# Patient Record
Sex: FEMALE | Race: WHITE | Employment: OTHER | URBAN - METROPOLITAN AREA
[De-identification: names, ages, dates, MRNs, and addresses within clinical notes are randomized per-mention and may not be internally consistent; named-entity substitution may affect disease eponyms.]

---

## 2021-02-09 ENCOUNTER — IMMUNIZATION (OUTPATIENT)
Dept: PRIMARY CARE CLINIC | Age: 66
End: 2021-02-09
Payer: MEDICARE

## 2021-02-09 PROCEDURE — 0011A PR IMM ADMN SARSCOV2 100 MCG/0.5 ML 1ST DOSE: CPT | Performed by: FAMILY MEDICINE

## 2021-02-09 PROCEDURE — 91301 COVID-19, MODERNA VACCINE 100MCG/0.5ML DOSE: CPT | Performed by: FAMILY MEDICINE

## 2021-02-11 ENCOUNTER — OFFICE VISIT (OUTPATIENT)
Dept: PRIMARY CARE CLINIC | Age: 66
End: 2021-02-11
Payer: MEDICARE

## 2021-02-11 VITALS
WEIGHT: 131 LBS | BODY MASS INDEX: 24.73 KG/M2 | HEIGHT: 61 IN | HEART RATE: 102 BPM | DIASTOLIC BLOOD PRESSURE: 70 MMHG | OXYGEN SATURATION: 96 % | SYSTOLIC BLOOD PRESSURE: 138 MMHG | TEMPERATURE: 96.7 F

## 2021-02-11 DIAGNOSIS — R03.0 ELEVATED BLOOD PRESSURE READING: Primary | ICD-10-CM

## 2021-02-11 PROCEDURE — 1111F DSCHRG MED/CURRENT MED MERGE: CPT | Performed by: FAMILY MEDICINE

## 2021-02-11 PROCEDURE — 99213 OFFICE O/P EST LOW 20 MIN: CPT | Performed by: FAMILY MEDICINE

## 2021-02-11 SDOH — ECONOMIC STABILITY: TRANSPORTATION INSECURITY
IN THE PAST 12 MONTHS, HAS THE LACK OF TRANSPORTATION KEPT YOU FROM MEDICAL APPOINTMENTS OR FROM GETTING MEDICATIONS?: NO

## 2021-02-11 SDOH — ECONOMIC STABILITY: TRANSPORTATION INSECURITY
IN THE PAST 12 MONTHS, HAS LACK OF TRANSPORTATION KEPT YOU FROM MEETINGS, WORK, OR FROM GETTING THINGS NEEDED FOR DAILY LIVING?: NO

## 2021-02-11 SDOH — ECONOMIC STABILITY: FOOD INSECURITY: WITHIN THE PAST 12 MONTHS, THE FOOD YOU BOUGHT JUST DIDN'T LAST AND YOU DIDN'T HAVE MONEY TO GET MORE.: NEVER TRUE

## 2021-02-11 SDOH — HEALTH STABILITY: MENTAL HEALTH: HOW MANY STANDARD DRINKS CONTAINING ALCOHOL DO YOU HAVE ON A TYPICAL DAY?: 1 OR 2

## 2021-02-11 SDOH — ECONOMIC STABILITY: FOOD INSECURITY: WITHIN THE PAST 12 MONTHS, YOU WORRIED THAT YOUR FOOD WOULD RUN OUT BEFORE YOU GOT MONEY TO BUY MORE.: NEVER TRUE

## 2021-02-11 SDOH — ECONOMIC STABILITY: INCOME INSECURITY: HOW HARD IS IT FOR YOU TO PAY FOR THE VERY BASICS LIKE FOOD, HOUSING, MEDICAL CARE, AND HEATING?: NOT HARD AT ALL

## 2021-02-11 ASSESSMENT — ENCOUNTER SYMPTOMS
RESPIRATORY NEGATIVE: 1
EYES NEGATIVE: 1
GASTROINTESTINAL NEGATIVE: 1

## 2021-02-11 ASSESSMENT — PATIENT HEALTH QUESTIONNAIRE - PHQ9
SUM OF ALL RESPONSES TO PHQ QUESTIONS 1-9: 0

## 2021-03-09 ENCOUNTER — IMMUNIZATION (OUTPATIENT)
Dept: PRIMARY CARE CLINIC | Age: 66
End: 2021-03-09
Payer: MEDICARE

## 2021-03-09 PROCEDURE — 0012A PR IMM ADMN SARSCOV2 100 MCG/0.5 ML 2ND DOSE: CPT | Performed by: FAMILY MEDICINE

## 2021-03-09 PROCEDURE — 91301 COVID-19, MODERNA VACCINE 100MCG/0.5ML DOSE: CPT | Performed by: FAMILY MEDICINE

## 2021-04-23 ENCOUNTER — OFFICE VISIT (OUTPATIENT)
Dept: PRIMARY CARE CLINIC | Age: 66
End: 2021-04-23
Payer: MEDICARE

## 2021-04-23 VITALS
BODY MASS INDEX: 24.77 KG/M2 | OXYGEN SATURATION: 95 % | DIASTOLIC BLOOD PRESSURE: 84 MMHG | HEIGHT: 61 IN | SYSTOLIC BLOOD PRESSURE: 138 MMHG | WEIGHT: 131.2 LBS | HEART RATE: 96 BPM

## 2021-04-23 DIAGNOSIS — F34.1 DYSTHYMIA: Primary | ICD-10-CM

## 2021-04-23 PROCEDURE — G8427 DOCREV CUR MEDS BY ELIG CLIN: HCPCS | Performed by: FAMILY MEDICINE

## 2021-04-23 PROCEDURE — 99213 OFFICE O/P EST LOW 20 MIN: CPT | Performed by: FAMILY MEDICINE

## 2021-04-23 PROCEDURE — 4004F PT TOBACCO SCREEN RCVD TLK: CPT | Performed by: FAMILY MEDICINE

## 2021-04-23 PROCEDURE — G8420 CALC BMI NORM PARAMETERS: HCPCS | Performed by: FAMILY MEDICINE

## 2021-04-23 PROCEDURE — G8400 PT W/DXA NO RESULTS DOC: HCPCS | Performed by: FAMILY MEDICINE

## 2021-04-23 PROCEDURE — 1090F PRES/ABSN URINE INCON ASSESS: CPT | Performed by: FAMILY MEDICINE

## 2021-04-23 PROCEDURE — 3017F COLORECTAL CA SCREEN DOC REV: CPT | Performed by: FAMILY MEDICINE

## 2021-04-23 PROCEDURE — 1123F ACP DISCUSS/DSCN MKR DOCD: CPT | Performed by: FAMILY MEDICINE

## 2021-04-23 PROCEDURE — 4040F PNEUMOC VAC/ADMIN/RCVD: CPT | Performed by: FAMILY MEDICINE

## 2021-04-23 RX ORDER — ESCITALOPRAM OXALATE 5 MG/1
5 TABLET ORAL DAILY
Qty: 30 TABLET | Refills: 5 | Status: SHIPPED | OUTPATIENT
Start: 2021-04-23 | End: 2021-05-21 | Stop reason: SDUPTHER

## 2021-04-23 ASSESSMENT — PATIENT HEALTH QUESTIONNAIRE - PHQ9
SUM OF ALL RESPONSES TO PHQ QUESTIONS 1-9: 7
2. FEELING DOWN, DEPRESSED OR HOPELESS: 2
10. IF YOU CHECKED OFF ANY PROBLEMS, HOW DIFFICULT HAVE THESE PROBLEMS MADE IT FOR YOU TO DO YOUR WORK, TAKE CARE OF THINGS AT HOME, OR GET ALONG WITH OTHER PEOPLE: 2
3. TROUBLE FALLING OR STAYING ASLEEP: 0
7. TROUBLE CONCENTRATING ON THINGS, SUCH AS READING THE NEWSPAPER OR WATCHING TELEVISION: 0
4. FEELING TIRED OR HAVING LITTLE ENERGY: 3

## 2021-05-21 ENCOUNTER — OFFICE VISIT (OUTPATIENT)
Dept: PRIMARY CARE CLINIC | Age: 66
End: 2021-05-21
Payer: MEDICARE

## 2021-05-21 VITALS
HEIGHT: 61 IN | SYSTOLIC BLOOD PRESSURE: 138 MMHG | DIASTOLIC BLOOD PRESSURE: 80 MMHG | BODY MASS INDEX: 24.24 KG/M2 | WEIGHT: 128.4 LBS | HEART RATE: 76 BPM | OXYGEN SATURATION: 95 %

## 2021-05-21 DIAGNOSIS — F32.A DEPRESSION, UNSPECIFIED DEPRESSION TYPE: Primary | ICD-10-CM

## 2021-05-21 PROCEDURE — 4004F PT TOBACCO SCREEN RCVD TLK: CPT | Performed by: FAMILY MEDICINE

## 2021-05-21 PROCEDURE — 1090F PRES/ABSN URINE INCON ASSESS: CPT | Performed by: FAMILY MEDICINE

## 2021-05-21 PROCEDURE — G8427 DOCREV CUR MEDS BY ELIG CLIN: HCPCS | Performed by: FAMILY MEDICINE

## 2021-05-21 PROCEDURE — 1123F ACP DISCUSS/DSCN MKR DOCD: CPT | Performed by: FAMILY MEDICINE

## 2021-05-21 PROCEDURE — G8400 PT W/DXA NO RESULTS DOC: HCPCS | Performed by: FAMILY MEDICINE

## 2021-05-21 PROCEDURE — 3017F COLORECTAL CA SCREEN DOC REV: CPT | Performed by: FAMILY MEDICINE

## 2021-05-21 PROCEDURE — 99213 OFFICE O/P EST LOW 20 MIN: CPT | Performed by: FAMILY MEDICINE

## 2021-05-21 PROCEDURE — G8420 CALC BMI NORM PARAMETERS: HCPCS | Performed by: FAMILY MEDICINE

## 2021-05-21 PROCEDURE — 4040F PNEUMOC VAC/ADMIN/RCVD: CPT | Performed by: FAMILY MEDICINE

## 2021-05-21 RX ORDER — ESCITALOPRAM OXALATE 10 MG/1
10 TABLET ORAL DAILY
Qty: 30 TABLET | Refills: 5 | Status: SHIPPED | OUTPATIENT
Start: 2021-05-21 | End: 2021-12-18 | Stop reason: SDUPTHER

## 2021-05-21 ASSESSMENT — PATIENT HEALTH QUESTIONNAIRE - PHQ9
SUM OF ALL RESPONSES TO PHQ QUESTIONS 1-9: 2
SUM OF ALL RESPONSES TO PHQ QUESTIONS 1-9: 2
SUM OF ALL RESPONSES TO PHQ9 QUESTIONS 1 & 2: 2
1. LITTLE INTEREST OR PLEASURE IN DOING THINGS: 1

## 2021-05-21 NOTE — PROGRESS NOTES
Subjective:      Patient ID: Calixto Osborn is a 77 y.o. female. hasnt seen much change and none reported by spouse  Tolerating lowdose lexapro      Review of Systems   Constitutional: Negative. Psychiatric/Behavioral: Negative. All other systems reviewed and are negative. Objective:   Physical Exam  Constitutional:       Appearance: She is normal weight. Cardiovascular:      Rate and Rhythm: Normal rate and regular rhythm. Neurological:      Mental Status: She is alert. Psychiatric:         Mood and Affect: Mood normal.         Behavior: Behavior normal.         Thought Content: Thought content normal.         Judgment: Judgment normal.         Assessment:      No diagnosis found. Plan:      There are no diagnoses linked to this encounter.     increase lexapro to 10 mg        Electronically signed by Cy Barber MD on 5/21/2021 at 11:36 AM

## 2021-06-22 ENCOUNTER — TELEPHONE (OUTPATIENT)
Dept: PRIMARY CARE CLINIC | Age: 66
End: 2021-06-22

## 2021-06-22 DIAGNOSIS — Z12.11 COLON CANCER SCREENING: Primary | ICD-10-CM

## 2021-06-25 ENCOUNTER — OFFICE VISIT (OUTPATIENT)
Dept: PRIMARY CARE CLINIC | Age: 66
End: 2021-06-25
Payer: MEDICARE

## 2021-06-25 VITALS
BODY MASS INDEX: 24.35 KG/M2 | SYSTOLIC BLOOD PRESSURE: 130 MMHG | DIASTOLIC BLOOD PRESSURE: 85 MMHG | HEIGHT: 61 IN | HEART RATE: 84 BPM | OXYGEN SATURATION: 94 % | WEIGHT: 129 LBS

## 2021-06-25 DIAGNOSIS — F32.A DEPRESSION, UNSPECIFIED DEPRESSION TYPE: Primary | ICD-10-CM

## 2021-06-25 PROCEDURE — G8427 DOCREV CUR MEDS BY ELIG CLIN: HCPCS | Performed by: FAMILY MEDICINE

## 2021-06-25 PROCEDURE — 1090F PRES/ABSN URINE INCON ASSESS: CPT | Performed by: FAMILY MEDICINE

## 2021-06-25 PROCEDURE — 4004F PT TOBACCO SCREEN RCVD TLK: CPT | Performed by: FAMILY MEDICINE

## 2021-06-25 PROCEDURE — G8400 PT W/DXA NO RESULTS DOC: HCPCS | Performed by: FAMILY MEDICINE

## 2021-06-25 PROCEDURE — 3017F COLORECTAL CA SCREEN DOC REV: CPT | Performed by: FAMILY MEDICINE

## 2021-06-25 PROCEDURE — G8420 CALC BMI NORM PARAMETERS: HCPCS | Performed by: FAMILY MEDICINE

## 2021-06-25 PROCEDURE — 4040F PNEUMOC VAC/ADMIN/RCVD: CPT | Performed by: FAMILY MEDICINE

## 2021-06-25 PROCEDURE — 99213 OFFICE O/P EST LOW 20 MIN: CPT | Performed by: FAMILY MEDICINE

## 2021-06-25 PROCEDURE — 1123F ACP DISCUSS/DSCN MKR DOCD: CPT | Performed by: FAMILY MEDICINE

## 2021-06-29 NOTE — PROGRESS NOTES
Subjective:      Patient ID: Cesar Rosales is a 74 y.o. female. Medication for depression is working      Review of Systems   Constitutional: Negative. All other systems reviewed and are negative. Objective:   Physical Exam  Vitals reviewed. Constitutional:       Appearance: Normal appearance. HENT:      Nose: Nose normal.   Eyes:      Pupils: Pupils are equal, round, and reactive to light. Cardiovascular:      Rate and Rhythm: Normal rate and regular rhythm. Pulmonary:      Effort: Pulmonary effort is normal.   Musculoskeletal:         General: Normal range of motion. Cervical back: Normal range of motion. Skin:     General: Skin is warm. Neurological:      General: No focal deficit present. Mental Status: She is alert. Psychiatric:         Mood and Affect: Mood normal.         Thought Content: Thought content normal.         Assessment:      No diagnosis found. Plan:      There are no diagnoses linked to this encounter.             Electronically signed by Tenzin Jacobs MD on 6/25/2021 at 8:44 AM

## 2021-07-08 ENCOUNTER — TELEPHONE (OUTPATIENT)
Dept: PRIMARY CARE CLINIC | Age: 66
End: 2021-07-08

## 2021-07-19 ENCOUNTER — TELEPHONE (OUTPATIENT)
Dept: PRIMARY CARE CLINIC | Age: 66
End: 2021-07-19

## 2021-10-18 ENCOUNTER — TELEPHONE (OUTPATIENT)
Dept: PRIMARY CARE CLINIC | Age: 66
End: 2021-10-18

## 2021-12-10 DIAGNOSIS — Z12.11 COLON CANCER SCREENING: ICD-10-CM

## 2021-12-20 RX ORDER — ESCITALOPRAM OXALATE 10 MG/1
10 TABLET ORAL DAILY
Qty: 30 TABLET | Refills: 5 | Status: SHIPPED | OUTPATIENT
Start: 2021-12-20 | End: 2022-01-19

## 2022-02-11 ENCOUNTER — IMMUNIZATION (OUTPATIENT)
Dept: PRIMARY CARE CLINIC | Age: 67
End: 2022-02-11
Payer: MEDICARE

## 2022-02-11 PROCEDURE — 0064A COVID-19, MODERNA BOOSTER VACCINE 0.25ML DOSE: CPT | Performed by: FAMILY MEDICINE

## 2022-02-11 PROCEDURE — 91306 COVID-19, MODERNA BOOSTER VACCINE 0.25ML DOSE: CPT | Performed by: FAMILY MEDICINE

## 2022-11-08 ENCOUNTER — OFFICE VISIT (OUTPATIENT)
Dept: PRIMARY CARE CLINIC | Age: 67
End: 2022-11-08
Payer: MEDICARE

## 2022-11-08 VITALS
WEIGHT: 115 LBS | BODY MASS INDEX: 21.73 KG/M2 | OXYGEN SATURATION: 96 % | SYSTOLIC BLOOD PRESSURE: 148 MMHG | DIASTOLIC BLOOD PRESSURE: 88 MMHG | HEART RATE: 90 BPM

## 2022-11-08 DIAGNOSIS — J06.9 UPPER RESPIRATORY TRACT INFECTION, UNSPECIFIED TYPE: Primary | ICD-10-CM

## 2022-11-08 DIAGNOSIS — R05.1 ACUTE COUGH: ICD-10-CM

## 2022-11-08 DIAGNOSIS — R06.02 SHORTNESS OF BREATH: ICD-10-CM

## 2022-11-08 PROCEDURE — 99213 OFFICE O/P EST LOW 20 MIN: CPT

## 2022-11-08 PROCEDURE — G8400 PT W/DXA NO RESULTS DOC: HCPCS

## 2022-11-08 PROCEDURE — 1090F PRES/ABSN URINE INCON ASSESS: CPT

## 2022-11-08 PROCEDURE — G8427 DOCREV CUR MEDS BY ELIG CLIN: HCPCS

## 2022-11-08 PROCEDURE — 1036F TOBACCO NON-USER: CPT

## 2022-11-08 PROCEDURE — 3017F COLORECTAL CA SCREEN DOC REV: CPT

## 2022-11-08 PROCEDURE — 1123F ACP DISCUSS/DSCN MKR DOCD: CPT

## 2022-11-08 PROCEDURE — G8420 CALC BMI NORM PARAMETERS: HCPCS

## 2022-11-08 PROCEDURE — G8484 FLU IMMUNIZE NO ADMIN: HCPCS

## 2022-11-08 RX ORDER — AZITHROMYCIN 250 MG/1
250 TABLET, FILM COATED ORAL SEE ADMIN INSTRUCTIONS
Qty: 6 TABLET | Refills: 0 | Status: SHIPPED | OUTPATIENT
Start: 2022-11-08 | End: 2022-11-13

## 2022-11-08 RX ORDER — PREDNISONE 20 MG/1
40 TABLET ORAL DAILY
Qty: 10 TABLET | Refills: 0 | Status: SHIPPED | OUTPATIENT
Start: 2022-11-08 | End: 2022-11-13

## 2022-11-08 RX ORDER — ALBUTEROL SULFATE 90 UG/1
2 AEROSOL, METERED RESPIRATORY (INHALATION) EVERY 6 HOURS PRN
Qty: 18 G | Refills: 3 | Status: SHIPPED | OUTPATIENT
Start: 2022-11-08

## 2022-11-08 RX ORDER — BENZONATATE 200 MG/1
200 CAPSULE ORAL 3 TIMES DAILY PRN
Qty: 21 CAPSULE | Refills: 0 | Status: SHIPPED | OUTPATIENT
Start: 2022-11-08 | End: 2022-11-15

## 2022-11-08 SDOH — ECONOMIC STABILITY: FOOD INSECURITY: WITHIN THE PAST 12 MONTHS, YOU WORRIED THAT YOUR FOOD WOULD RUN OUT BEFORE YOU GOT MONEY TO BUY MORE.: NEVER TRUE

## 2022-11-08 SDOH — ECONOMIC STABILITY: FOOD INSECURITY: WITHIN THE PAST 12 MONTHS, THE FOOD YOU BOUGHT JUST DIDN'T LAST AND YOU DIDN'T HAVE MONEY TO GET MORE.: NEVER TRUE

## 2022-11-08 ASSESSMENT — ENCOUNTER SYMPTOMS
COUGH: 1
SORE THROAT: 1
CHEST TIGHTNESS: 0
VOMITING: 0
EYE DISCHARGE: 0
SINUS PRESSURE: 0
WHEEZING: 1
SHORTNESS OF BREATH: 1
RHINORRHEA: 1
DIARRHEA: 0

## 2022-11-08 ASSESSMENT — PATIENT HEALTH QUESTIONNAIRE - PHQ9
6. FEELING BAD ABOUT YOURSELF - OR THAT YOU ARE A FAILURE OR HAVE LET YOURSELF OR YOUR FAMILY DOWN: 0
10. IF YOU CHECKED OFF ANY PROBLEMS, HOW DIFFICULT HAVE THESE PROBLEMS MADE IT FOR YOU TO DO YOUR WORK, TAKE CARE OF THINGS AT HOME, OR GET ALONG WITH OTHER PEOPLE: 0
5. POOR APPETITE OR OVEREATING: 0
9. THOUGHTS THAT YOU WOULD BE BETTER OFF DEAD, OR OF HURTING YOURSELF: 0
SUM OF ALL RESPONSES TO PHQ QUESTIONS 1-9: 0
8. MOVING OR SPEAKING SO SLOWLY THAT OTHER PEOPLE COULD HAVE NOTICED. OR THE OPPOSITE, BEING SO FIGETY OR RESTLESS THAT YOU HAVE BEEN MOVING AROUND A LOT MORE THAN USUAL: 0
3. TROUBLE FALLING OR STAYING ASLEEP: 0
SUM OF ALL RESPONSES TO PHQ QUESTIONS 1-9: 0
4. FEELING TIRED OR HAVING LITTLE ENERGY: 0
1. LITTLE INTEREST OR PLEASURE IN DOING THINGS: 0
SUM OF ALL RESPONSES TO PHQ QUESTIONS 1-9: 0
2. FEELING DOWN, DEPRESSED OR HOPELESS: 0
SUM OF ALL RESPONSES TO PHQ QUESTIONS 1-9: 0
SUM OF ALL RESPONSES TO PHQ9 QUESTIONS 1 & 2: 0
7. TROUBLE CONCENTRATING ON THINGS, SUCH AS READING THE NEWSPAPER OR WATCHING TELEVISION: 0

## 2022-11-08 ASSESSMENT — SOCIAL DETERMINANTS OF HEALTH (SDOH): HOW HARD IS IT FOR YOU TO PAY FOR THE VERY BASICS LIKE FOOD, HOUSING, MEDICAL CARE, AND HEATING?: NOT HARD AT ALL

## 2022-11-08 NOTE — PROGRESS NOTES
[facility] [address] WALK-IN  [address] [place] [address] [id number]  Dept: [phone]    Emerson Moreno is a 69 y.o. female Established patient, who presents to the walk-in clinic today with conditions/complaints as noted below:    Chief Complaint   Patient presents with    Cough     Productive, SOB, runny nose x4 days. Denies GI issues         HPI:     Patient is a 24-year-old female that presents today for cold-like symptoms. Her symptoms started approximately four days ago and include nasal congestion, runny nose, sneezing, chills, and productive cough. Describes thick green/yellow sputum production. Admits to sore throat and headaches from coughing. Notes mild shortness of breath and occasional wheezing. PMH significant for COPD. Denies any known sick contacts. She has tried over-the-counter cold medication with mild relief. Denies any known fevers, chest pain, or GI symptoms. Past Medical History:   Diagnosis Date    Depression        Current Outpatient Medications   Medication Sig Dispense Refill    predniSONE (DELTASONE) 20 MG tablet Take 2 tablets by mouth daily for 5 days 10 tablet 0    albuterol sulfate HFA (PROVENTIL HFA) 108 (90 Base) MCG/ACT inhaler Inhale 2 puffs into the lungs every 6 hours as needed for Wheezing 18 g 3    azithromycin (ZITHROMAX) 250 MG tablet Take 1 tablet by mouth See Admin Instructions for 5 days 500mg on day 1 followed by 250mg on days 2 - 5 6 tablet 0    benzonatate (TESSALON) 200 MG capsule Take 1 capsule by mouth 3 times daily as needed for Cough 21 capsule 0     No current facility-administered medications for this visit. No Known Allergies    :     Review of Systems   Constitutional:  Positive for chills. Negative for fever.    HENT:  Positive for congestion, rhinorrhea, sneezing and sore throat. Negative for ear pain and sinus pressure. Eyes:  Negative for discharge. Respiratory:  Positive for cough (productive), shortness of breath and wheezing. Negative for chest tightness. Cardiovascular:  Negative for chest pain. Gastrointestinal:  Negative for diarrhea and vomiting. Musculoskeletal:  Negative for myalgias. Skin:  Negative for rash. Neurological:  Positive for headaches. Negative for dizziness.     :     BP (!) 148/88   Pulse 90   Wt 115 lb (52.2 kg)   SpO2 96%   BMI 21.73 kg/m²     Physical Exam  Vitals reviewed. Constitutional:       General: She is not in acute distress. Appearance: Normal appearance. HENT:      Head: Normocephalic and atraumatic. Right Ear: Tympanic membrane, ear canal and external ear normal.      Left Ear: Tympanic membrane, ear canal and external ear normal.      Nose: Congestion present. Mouth/Throat:      Mouth: Mucous membranes are moist.      Pharynx: Oropharynx is clear. Uvula midline. No posterior oropharyngeal erythema. Eyes:      Conjunctiva/sclera: Conjunctivae normal.   Cardiovascular:      Rate and Rhythm: Normal rate and regular rhythm. Heart sounds: Normal heart sounds. Pulmonary:      Effort: Pulmonary effort is normal.      Breath sounds: Decreased breath sounds (throughout) present. No wheezing, rhonchi or rales. Musculoskeletal:      Cervical back: Neck supple. Lymphadenopathy:      Cervical: No cervical adenopathy. Skin:     General: Skin is warm and dry. Findings: No rash. Neurological:      Mental Status: She is alert and oriented to person, place, and time. Psychiatric:         Mood and Affect: Mood normal.         Behavior: Behavior normal.         :          1. Upper respiratory tract infection, unspecified type  -     azithromycin (ZITHROMAX) 250 MG tablet;  Take 1 tablet by mouth See Admin Instructions for 5 days 500mg on day 1 followed by 250mg on days 2 - 5, Disp-6 tablet, R-0Normal  2. Acute cough  -     benzonatate (TESSALON) 200 MG capsule; Take 1 capsule by mouth 3 times daily as needed for Cough, Disp-21 capsule, R-0Normal  3. Shortness of breath  -     predniSONE (DELTASONE) 20 MG tablet; Take 2 tablets by mouth daily for 5 days, Disp-10 tablet, R-0Normal  -     albuterol sulfate HFA (PROVENTIL HFA) 108 (90 Base) MCG/ACT inhaler; Inhale 2 puffs into the lungs every 6 hours as needed for Wheezing, Disp-18 g, R-3Normal     :      Return if symptoms worsen or fail to improve. Orders Placed This Encounter   Medications    predniSONE (DELTASONE) 20 MG tablet     Sig: Take 2 tablets by mouth daily for 5 days     Dispense:  10 tablet     Refill:  0    albuterol sulfate HFA (PROVENTIL HFA) 108 (90 Base) MCG/ACT inhaler     Sig: Inhale 2 puffs into the lungs every 6 hours as needed for Wheezing     Dispense:  18 g     Refill:  3    azithromycin (ZITHROMAX) 250 MG tablet     Sig: Take 1 tablet by mouth See Admin Instructions for 5 days 500mg on day 1 followed by 250mg on days 2 - 5     Dispense:  6 tablet     Refill:  0    benzonatate (TESSALON) 200 MG capsule     Sig: Take 1 capsule by mouth 3 times daily as needed for Cough     Dispense:  21 capsule     Refill:  0     Patient declined COVID-19 testing. Instructed to finish the entire course of antibiotic treatment. Complete short course of oral steroids. May use cough suppressant as needed. Use albuterol inhaler as needed for shortness of breath or wheezing. Push oral hydration and rest.  Use acetaminophen or ibuprofen as needed for headaches or discomfort. Recommend daily use of mucinex. Follow-up if worsening or no improvement. Patient and/or parent given educational materials - see patient instructions. Discussed use, benefit, and side effects of prescribed medications. All patient questions answered. Patient and/or parent voiced understanding.       Electronically signed by BRET Correa 11/8/2022 at 12:54 PM

## 2022-11-08 NOTE — PATIENT INSTRUCTIONS
Finish the entire course of antibiotic treatment. Push oral hydration. Complete short course of oral steroids. Use Albuterol inhaler as needed for wheezing or shortness of breath. May use cough suppressant as needed.   Follow-up if worsening or no improvement'

## 2022-11-22 ENCOUNTER — NURSE ONLY (OUTPATIENT)
Dept: PRIMARY CARE CLINIC | Age: 67
End: 2022-11-22
Payer: MEDICARE

## 2022-11-22 DIAGNOSIS — Z23 NEED FOR IMMUNIZATION AGAINST INFLUENZA: Primary | ICD-10-CM

## 2022-11-22 PROCEDURE — G0008 ADMIN INFLUENZA VIRUS VAC: HCPCS | Performed by: FAMILY MEDICINE

## 2022-11-22 PROCEDURE — 90694 VACC AIIV4 NO PRSRV 0.5ML IM: CPT | Performed by: FAMILY MEDICINE

## 2023-11-04 SDOH — HEALTH STABILITY: PHYSICAL HEALTH: ON AVERAGE, HOW MANY DAYS PER WEEK DO YOU ENGAGE IN MODERATE TO STRENUOUS EXERCISE (LIKE A BRISK WALK)?: 1 DAY

## 2023-11-04 SDOH — HEALTH STABILITY: PHYSICAL HEALTH: ON AVERAGE, HOW MANY MINUTES DO YOU ENGAGE IN EXERCISE AT THIS LEVEL?: 10 MIN

## 2023-11-04 ASSESSMENT — SOCIAL DETERMINANTS OF HEALTH (SDOH)
WITHIN THE LAST YEAR, HAVE YOU BEEN KICKED, HIT, SLAPPED, OR OTHERWISE PHYSICALLY HURT BY YOUR PARTNER OR EX-PARTNER?: NO
WITHIN THE LAST YEAR, HAVE YOU BEEN AFRAID OF YOUR PARTNER OR EX-PARTNER?: NO
WITHIN THE LAST YEAR, HAVE TO BEEN RAPED OR FORCED TO HAVE ANY KIND OF SEXUAL ACTIVITY BY YOUR PARTNER OR EX-PARTNER?: NO
WITHIN THE LAST YEAR, HAVE YOU BEEN HUMILIATED OR EMOTIONALLY ABUSED IN OTHER WAYS BY YOUR PARTNER OR EX-PARTNER?: NO

## 2023-11-07 ENCOUNTER — OFFICE VISIT (OUTPATIENT)
Dept: PRIMARY CARE CLINIC | Age: 68
End: 2023-11-07
Payer: MEDICARE

## 2023-11-07 VITALS
HEIGHT: 60 IN | SYSTOLIC BLOOD PRESSURE: 136 MMHG | BODY MASS INDEX: 23.91 KG/M2 | WEIGHT: 121.8 LBS | RESPIRATION RATE: 16 BRPM | OXYGEN SATURATION: 99 % | HEART RATE: 89 BPM | DIASTOLIC BLOOD PRESSURE: 82 MMHG

## 2023-11-07 DIAGNOSIS — Z13.1 SCREENING FOR DIABETES MELLITUS (DM): ICD-10-CM

## 2023-11-07 DIAGNOSIS — Z87.891 PERSONAL HISTORY OF TOBACCO USE: ICD-10-CM

## 2023-11-07 DIAGNOSIS — F33.1 MODERATE EPISODE OF RECURRENT MAJOR DEPRESSIVE DISORDER (HCC): ICD-10-CM

## 2023-11-07 DIAGNOSIS — R00.2 PALPITATIONS: ICD-10-CM

## 2023-11-07 DIAGNOSIS — Z00.00 INITIAL MEDICARE ANNUAL WELLNESS VISIT: Primary | ICD-10-CM

## 2023-11-07 DIAGNOSIS — Z13.0 SCREENING, ANEMIA, DEFICIENCY, IRON: ICD-10-CM

## 2023-11-07 DIAGNOSIS — Z12.31 ENCOUNTER FOR SCREENING MAMMOGRAM FOR MALIGNANT NEOPLASM OF BREAST: ICD-10-CM

## 2023-11-07 DIAGNOSIS — Z11.59 NEED FOR HEPATITIS C SCREENING TEST: ICD-10-CM

## 2023-11-07 DIAGNOSIS — Z13.6 SCREENING FOR CARDIOVASCULAR CONDITION: ICD-10-CM

## 2023-11-07 PROCEDURE — G8484 FLU IMMUNIZE NO ADMIN: HCPCS | Performed by: NURSE PRACTITIONER

## 2023-11-07 PROCEDURE — 3017F COLORECTAL CA SCREEN DOC REV: CPT | Performed by: NURSE PRACTITIONER

## 2023-11-07 PROCEDURE — G0296 VISIT TO DETERM LDCT ELIG: HCPCS | Performed by: NURSE PRACTITIONER

## 2023-11-07 PROCEDURE — 1123F ACP DISCUSS/DSCN MKR DOCD: CPT | Performed by: NURSE PRACTITIONER

## 2023-11-07 PROCEDURE — G0438 PPPS, INITIAL VISIT: HCPCS | Performed by: NURSE PRACTITIONER

## 2023-11-07 RX ORDER — NICOTINE 21 MG/24HR
1 PATCH, TRANSDERMAL 24 HOURS TRANSDERMAL DAILY
Qty: 30 PATCH | Refills: 5 | Status: SHIPPED | OUTPATIENT
Start: 2023-11-07 | End: 2024-05-05

## 2023-11-07 RX ORDER — FLUOXETINE HYDROCHLORIDE 20 MG/1
20 CAPSULE ORAL DAILY
Qty: 30 CAPSULE | Refills: 2 | Status: SHIPPED | OUTPATIENT
Start: 2023-11-07

## 2023-11-07 SDOH — ECONOMIC STABILITY: FOOD INSECURITY: WITHIN THE PAST 12 MONTHS, THE FOOD YOU BOUGHT JUST DIDN'T LAST AND YOU DIDN'T HAVE MONEY TO GET MORE.: NEVER TRUE

## 2023-11-07 SDOH — ECONOMIC STABILITY: FOOD INSECURITY: WITHIN THE PAST 12 MONTHS, YOU WORRIED THAT YOUR FOOD WOULD RUN OUT BEFORE YOU GOT MONEY TO BUY MORE.: NEVER TRUE

## 2023-11-07 SDOH — ECONOMIC STABILITY: HOUSING INSECURITY
IN THE LAST 12 MONTHS, WAS THERE A TIME WHEN YOU DID NOT HAVE A STEADY PLACE TO SLEEP OR SLEPT IN A SHELTER (INCLUDING NOW)?: NO

## 2023-11-07 SDOH — ECONOMIC STABILITY: INCOME INSECURITY: HOW HARD IS IT FOR YOU TO PAY FOR THE VERY BASICS LIKE FOOD, HOUSING, MEDICAL CARE, AND HEATING?: NOT HARD AT ALL

## 2023-11-07 ASSESSMENT — PATIENT HEALTH QUESTIONNAIRE - PHQ9
SUM OF ALL RESPONSES TO PHQ9 QUESTIONS 1 & 2: 3
2. FEELING DOWN, DEPRESSED OR HOPELESS: 2
10. IF YOU CHECKED OFF ANY PROBLEMS, HOW DIFFICULT HAVE THESE PROBLEMS MADE IT FOR YOU TO DO YOUR WORK, TAKE CARE OF THINGS AT HOME, OR GET ALONG WITH OTHER PEOPLE: 1
4. FEELING TIRED OR HAVING LITTLE ENERGY: 2
SUM OF ALL RESPONSES TO PHQ QUESTIONS 1-9: 6
1. LITTLE INTEREST OR PLEASURE IN DOING THINGS: 1
SUM OF ALL RESPONSES TO PHQ QUESTIONS 1-9: 6
8. MOVING OR SPEAKING SO SLOWLY THAT OTHER PEOPLE COULD HAVE NOTICED. OR THE OPPOSITE, BEING SO FIGETY OR RESTLESS THAT YOU HAVE BEEN MOVING AROUND A LOT MORE THAN USUAL: 1
6. FEELING BAD ABOUT YOURSELF - OR THAT YOU ARE A FAILURE OR HAVE LET YOURSELF OR YOUR FAMILY DOWN: 0
5. POOR APPETITE OR OVEREATING: 0
SUM OF ALL RESPONSES TO PHQ QUESTIONS 1-9: 6
3. TROUBLE FALLING OR STAYING ASLEEP: 0
SUM OF ALL RESPONSES TO PHQ QUESTIONS 1-9: 6
7. TROUBLE CONCENTRATING ON THINGS, SUCH AS READING THE NEWSPAPER OR WATCHING TELEVISION: 0
9. THOUGHTS THAT YOU WOULD BE BETTER OFF DEAD, OR OF HURTING YOURSELF: 0

## 2023-11-07 ASSESSMENT — LIFESTYLE VARIABLES: HOW MANY STANDARD DRINKS CONTAINING ALCOHOL DO YOU HAVE ON A TYPICAL DAY: 1 OR 2

## 2023-11-07 NOTE — PROGRESS NOTES
lungs every 6 hours as needed for Wheezing  Patient not taking: Reported on 11/7/2023  Hakan Fu APRN - CNP       CareTeam (Including outside providers/suppliers regularly involved in providing care):   Patient Care Team:  BRET Bo CNP as PCP - General (Nurse Practitioner)     Reviewed and updated this visit:  Tobacco  Allergies  Meds  Problems  Med Hx  Surg Hx  Soc Hx  Fam Hx

## 2023-11-09 ENCOUNTER — HOSPITAL ENCOUNTER (OUTPATIENT)
Dept: NON INVASIVE DIAGNOSTICS | Age: 68
Discharge: HOME | End: 2023-11-09
Payer: MEDICARE

## 2023-11-09 ENCOUNTER — HOSPITAL ENCOUNTER (OUTPATIENT)
Age: 68
Discharge: HOME | End: 2023-11-09
Payer: MEDICARE

## 2023-11-09 DIAGNOSIS — Z13.0 SCREENING, ANEMIA, DEFICIENCY, IRON: ICD-10-CM

## 2023-11-09 DIAGNOSIS — Z13.6 SCREENING FOR CARDIOVASCULAR CONDITION: ICD-10-CM

## 2023-11-09 DIAGNOSIS — R00.2 PALPITATIONS: ICD-10-CM

## 2023-11-09 DIAGNOSIS — Z11.59 NEED FOR HEPATITIS C SCREENING TEST: ICD-10-CM

## 2023-11-09 DIAGNOSIS — Z13.1 SCREENING FOR DIABETES MELLITUS (DM): ICD-10-CM

## 2023-11-09 LAB
ALBUMIN SERPL-MCNC: 4.3 G/DL (ref 3.5–5.2)
ALBUMIN/GLOB SERPL: 1.5 {RATIO} (ref 1–2.5)
ALP SERPL-CCNC: 93 U/L (ref 35–104)
ALT SERPL-CCNC: 11 U/L (ref 5–33)
ANION GAP SERPL CALCULATED.3IONS-SCNC: 6 MMOL/L (ref 9–17)
AST SERPL-CCNC: 14 U/L
BASOPHILS # BLD: 0.06 K/UL (ref 0–0.2)
BASOPHILS NFR BLD: 1 % (ref 0–2)
BILIRUB SERPL-MCNC: 0.6 MG/DL (ref 0.3–1.2)
BUN SERPL-MCNC: 9 MG/DL (ref 8–23)
BUN/CREAT SERPL: 18 (ref 9–20)
CALCIUM SERPL-MCNC: 10 MG/DL (ref 8.6–10.4)
CHLORIDE SERPL-SCNC: 99 MMOL/L (ref 98–107)
CHOLEST SERPL-MCNC: 219 MG/DL
CHOLESTEROL/HDL RATIO: 2.5
CO2 SERPL-SCNC: 33 MMOL/L (ref 20–31)
CREAT SERPL-MCNC: 0.5 MG/DL (ref 0.5–0.9)
EOSINOPHIL # BLD: 0.04 K/UL (ref 0–0.44)
EOSINOPHILS RELATIVE PERCENT: 1 % (ref 1–4)
ERYTHROCYTE [DISTWIDTH] IN BLOOD BY AUTOMATED COUNT: 12.5 % (ref 11.8–14.4)
GFR SERPL CREATININE-BSD FRML MDRD: >60 ML/MIN/1.73M2
GLUCOSE SERPL-MCNC: 101 MG/DL (ref 70–99)
HCT VFR BLD AUTO: 46.4 % (ref 36.3–47.1)
HCV AB SERPL QL IA: NONREACTIVE
HDLC SERPL-MCNC: 87 MG/DL
HGB BLD-MCNC: 15.1 G/DL (ref 11.9–15.1)
IMM GRANULOCYTES # BLD AUTO: <0.03 K/UL (ref 0–0.3)
IMM GRANULOCYTES NFR BLD: 0 %
LDLC SERPL CALC-MCNC: 118 MG/DL (ref 0–130)
LYMPHOCYTES NFR BLD: 1.45 K/UL (ref 1.1–3.7)
LYMPHOCYTES RELATIVE PERCENT: 23 % (ref 24–43)
MCH RBC QN AUTO: 30.3 PG (ref 25.2–33.5)
MCHC RBC AUTO-ENTMCNC: 32.5 G/DL (ref 25.2–33.5)
MCV RBC AUTO: 93 FL (ref 82.6–102.9)
MONOCYTES NFR BLD: 0.47 K/UL (ref 0.1–1.2)
MONOCYTES NFR BLD: 8 % (ref 3–12)
NEUTROPHILS NFR BLD: 67 % (ref 36–65)
NEUTS SEG NFR BLD: 4.17 K/UL (ref 1.5–8.1)
NRBC BLD-RTO: 0 PER 100 WBC
PLATELET # BLD AUTO: 294 K/UL (ref 138–453)
PMV BLD AUTO: 10.1 FL (ref 8.1–13.5)
POTASSIUM SERPL-SCNC: 5 MMOL/L (ref 3.7–5.3)
PROT SERPL-MCNC: 7.1 G/DL (ref 6.4–8.3)
RBC # BLD AUTO: 4.99 M/UL (ref 3.95–5.11)
SODIUM SERPL-SCNC: 138 MMOL/L (ref 135–144)
T4 FREE SERPL-MCNC: 1.5 NG/DL (ref 0.9–1.7)
TRIGL SERPL-MCNC: 71 MG/DL
TSH SERPL DL<=0.05 MIU/L-ACNC: 0.89 UIU/ML (ref 0.3–5)
WBC OTHER # BLD: 6.2 K/UL (ref 3.5–11.3)

## 2023-11-09 PROCEDURE — 80053 COMPREHEN METABOLIC PANEL: CPT

## 2023-11-09 PROCEDURE — 85025 COMPLETE CBC W/AUTO DIFF WBC: CPT

## 2023-11-09 PROCEDURE — 80061 LIPID PANEL: CPT

## 2023-11-09 PROCEDURE — 84439 ASSAY OF FREE THYROXINE: CPT

## 2023-11-09 PROCEDURE — 84443 ASSAY THYROID STIM HORMONE: CPT

## 2023-11-09 PROCEDURE — 93005 ELECTROCARDIOGRAM TRACING: CPT

## 2023-11-09 PROCEDURE — 36415 COLL VENOUS BLD VENIPUNCTURE: CPT

## 2023-11-09 PROCEDURE — 86803 HEPATITIS C AB TEST: CPT

## 2023-11-12 DIAGNOSIS — R00.2 PALPITATION: ICD-10-CM

## 2023-11-12 DIAGNOSIS — R94.31 ABNORMAL EKG: Primary | ICD-10-CM

## 2023-11-12 LAB
EKG ATRIAL RATE: 82 BPM
EKG P AXIS: 83 DEGREES
EKG P-R INTERVAL: 140 MS
EKG Q-T INTERVAL: 374 MS
EKG QRS DURATION: 78 MS
EKG QTC CALCULATION (BAZETT): 436 MS
EKG R AXIS: -55 DEGREES
EKG T AXIS: 56 DEGREES
EKG VENTRICULAR RATE: 82 BPM

## 2023-11-13 ENCOUNTER — TELEPHONE (OUTPATIENT)
Dept: ONCOLOGY | Age: 68
End: 2023-11-13

## 2023-11-14 ENCOUNTER — HOSPITAL ENCOUNTER (OUTPATIENT)
Dept: MAMMOGRAPHY | Age: 68
Discharge: HOME | End: 2023-11-16
Payer: MEDICARE

## 2023-11-14 VITALS — WEIGHT: 120 LBS | HEIGHT: 60 IN | BODY MASS INDEX: 23.56 KG/M2

## 2023-11-14 DIAGNOSIS — Z12.31 ENCOUNTER FOR SCREENING MAMMOGRAM FOR MALIGNANT NEOPLASM OF BREAST: ICD-10-CM

## 2023-11-14 PROCEDURE — 77063 BREAST TOMOSYNTHESIS BI: CPT

## 2023-11-16 ENCOUNTER — HOSPITAL ENCOUNTER (OUTPATIENT)
Dept: NUCLEAR MEDICINE | Age: 68
Discharge: HOME | End: 2023-11-18
Payer: MEDICARE

## 2023-11-16 ENCOUNTER — HOSPITAL ENCOUNTER (OUTPATIENT)
Age: 68
Discharge: HOME | End: 2023-11-18
Payer: MEDICARE

## 2023-11-16 DIAGNOSIS — R94.39 ABNORMAL STRESS TEST: Primary | ICD-10-CM

## 2023-11-16 DIAGNOSIS — R00.2 PALPITATION: ICD-10-CM

## 2023-11-16 DIAGNOSIS — R94.31 ABNORMAL EKG: ICD-10-CM

## 2023-11-16 DIAGNOSIS — I25.9: ICD-10-CM

## 2023-11-16 LAB
NUC STRESS EJECTION FRACTION: 70 %
STRESS BASELINE DIAS BP: 97 MMHG
STRESS BASELINE HR: 80 BPM
STRESS BASELINE SYS BP: 171 MMHG
STRESS ESTIMATED WORKLOAD: 1 METS
STRESS PEAK DIAS BP: 97 MMHG
STRESS PEAK SYS BP: 171 MMHG
STRESS PERCENT HR ACHIEVED: 74 %
STRESS POST PEAK HR: 111 BPM
STRESS RATE PRESSURE PRODUCT: NORMAL BPM*MMHG
STRESS TARGET HR: 150 BPM
TID: 1.05

## 2023-11-16 PROCEDURE — 93016 CV STRESS TEST SUPVJ ONLY: CPT | Performed by: INTERNAL MEDICINE

## 2023-11-16 PROCEDURE — 78452 HT MUSCLE IMAGE SPECT MULT: CPT | Performed by: INTERNAL MEDICINE

## 2023-11-16 PROCEDURE — 3430000000 HC RX DIAGNOSTIC RADIOPHARMACEUTICAL: Performed by: NURSE PRACTITIONER

## 2023-11-16 PROCEDURE — 93018 CV STRESS TEST I&R ONLY: CPT | Performed by: INTERNAL MEDICINE

## 2023-11-16 PROCEDURE — 93017 CV STRESS TEST TRACING ONLY: CPT

## 2023-11-16 PROCEDURE — 6360000002 HC RX W HCPCS: Performed by: INTERNAL MEDICINE

## 2023-11-16 PROCEDURE — 78452 HT MUSCLE IMAGE SPECT MULT: CPT

## 2023-11-16 PROCEDURE — A9500 TC99M SESTAMIBI: HCPCS | Performed by: NURSE PRACTITIONER

## 2023-11-16 RX ORDER — TETRAKIS(2-METHOXYISOBUTYLISOCYANIDE)COPPER(I) TETRAFLUOROBORATE 1 MG/ML
30 INJECTION, POWDER, LYOPHILIZED, FOR SOLUTION INTRAVENOUS
Status: COMPLETED | OUTPATIENT
Start: 2023-11-16 | End: 2023-11-16

## 2023-11-16 RX ORDER — REGADENOSON 0.08 MG/ML
0.4 INJECTION, SOLUTION INTRAVENOUS ONCE
Status: COMPLETED | OUTPATIENT
Start: 2023-11-16 | End: 2023-11-16

## 2023-11-16 RX ORDER — TETRAKIS(2-METHOXYISOBUTYLISOCYANIDE)COPPER(I) TETRAFLUOROBORATE 1 MG/ML
10 INJECTION, POWDER, LYOPHILIZED, FOR SOLUTION INTRAVENOUS
Status: COMPLETED | OUTPATIENT
Start: 2023-11-16 | End: 2023-11-16

## 2023-11-16 RX ADMIN — REGADENOSON 0.4 MG: 0.08 INJECTION, SOLUTION INTRAVENOUS at 10:55

## 2023-11-16 RX ADMIN — Medication 30 MILLICURIE: at 10:55

## 2023-11-16 RX ADMIN — Medication 10 MILLICURIE: at 09:55

## 2023-11-17 ENCOUNTER — PATIENT MESSAGE (OUTPATIENT)
Dept: PRIMARY CARE CLINIC | Age: 68
End: 2023-11-17

## 2023-11-17 NOTE — PROGRESS NOTES
Unable to schedule an appointment with [facility]. Writer was put on hold for 7mins. Pt was notified and states she will give the office a call to schedule appt herself. Writer is faxing the referral over to [facility] per pt's request.

## 2023-11-17 NOTE — TELEPHONE ENCOUNTER
From: Barry Bowen  To:  Mick Celestin  Sent: 11/17/2023 11:31 AM EST  Subject: Testing for heart    Can this be scheduled at [facility] in [facility].  [facility]

## 2023-11-17 NOTE — PROGRESS NOTES
Attempted to reach out to [facility]. Was transferred to [place] location. Was on hold for 15 minutes.

## 2023-11-17 NOTE — PROGRESS NOTES
Writer called [facility] and spoke with Yanet. Writer asked if they can schedule for the [place] location and they are unable to. They do not have access. Cliffordr attempted to pend referral to [place] location but [facility] does not have that as an option; Yanet states that we cannot place a referral to the [facility] location if patient wants to go to [place], that we would need a separate referral and it can be faxed to [phone].     Cliffordr called [place] office again, and was placed on hold for 10 minutes. Unable to reach office. Please advise

## 2023-11-17 NOTE — PROGRESS NOTES
New referral placed to [facility], please call to see if we can help pt get scheduled. If unable to reach today, please inform pt to call to schedule.

## 2023-11-17 NOTE — TELEPHONE ENCOUNTER
Called and talked to patient.  Patient stated they already called and made an appointment at the hospital.

## 2023-11-22 ENCOUNTER — OFFICE VISIT (OUTPATIENT)
Dept: CARDIOLOGY | Age: 68
End: 2023-11-22
Payer: MEDICARE

## 2023-11-22 VITALS
DIASTOLIC BLOOD PRESSURE: 88 MMHG | HEART RATE: 84 BPM | RESPIRATION RATE: 12 BRPM | OXYGEN SATURATION: 94 % | BODY MASS INDEX: 23.75 KG/M2 | WEIGHT: 121 LBS | HEIGHT: 60 IN | SYSTOLIC BLOOD PRESSURE: 136 MMHG

## 2023-11-22 DIAGNOSIS — R94.39 ABNORMAL STRESS TEST: ICD-10-CM

## 2023-11-22 DIAGNOSIS — E78.5 HYPERLIPIDEMIA, UNSPECIFIED HYPERLIPIDEMIA TYPE: ICD-10-CM

## 2023-11-22 DIAGNOSIS — R06.09 DYSPNEA ON EXERTION: ICD-10-CM

## 2023-11-22 DIAGNOSIS — R94.31 ABNORMAL EKG: Primary | ICD-10-CM

## 2023-11-22 DIAGNOSIS — R00.2 PALPITATIONS: ICD-10-CM

## 2023-11-22 PROCEDURE — G8427 DOCREV CUR MEDS BY ELIG CLIN: HCPCS | Performed by: INTERNAL MEDICINE

## 2023-11-22 PROCEDURE — 1090F PRES/ABSN URINE INCON ASSESS: CPT | Performed by: INTERNAL MEDICINE

## 2023-11-22 PROCEDURE — 4004F PT TOBACCO SCREEN RCVD TLK: CPT | Performed by: INTERNAL MEDICINE

## 2023-11-22 PROCEDURE — G8484 FLU IMMUNIZE NO ADMIN: HCPCS | Performed by: INTERNAL MEDICINE

## 2023-11-22 PROCEDURE — 1123F ACP DISCUSS/DSCN MKR DOCD: CPT | Performed by: INTERNAL MEDICINE

## 2023-11-22 PROCEDURE — G8400 PT W/DXA NO RESULTS DOC: HCPCS | Performed by: INTERNAL MEDICINE

## 2023-11-22 PROCEDURE — 99204 OFFICE O/P NEW MOD 45 MIN: CPT | Performed by: INTERNAL MEDICINE

## 2023-11-22 PROCEDURE — G8420 CALC BMI NORM PARAMETERS: HCPCS | Performed by: INTERNAL MEDICINE

## 2023-11-22 PROCEDURE — 3017F COLORECTAL CA SCREEN DOC REV: CPT | Performed by: INTERNAL MEDICINE

## 2023-11-22 PROCEDURE — 99205 OFFICE O/P NEW HI 60 MIN: CPT | Performed by: INTERNAL MEDICINE

## 2023-11-22 RX ORDER — ROSUVASTATIN CALCIUM 10 MG/1
10 TABLET, COATED ORAL DAILY
Qty: 30 TABLET | Refills: 5 | Status: SHIPPED | OUTPATIENT
Start: 2023-11-22

## 2023-11-22 RX ORDER — ASPIRIN 81 MG/1
81 TABLET ORAL DAILY
Qty: 30 TABLET | Refills: 5 | Status: SHIPPED | OUTPATIENT
Start: 2023-11-22

## 2023-11-22 NOTE — PATIENT INSTRUCTIONS
Your Procedure Will Be Scheduled at:      [facility]    [address] [place], [address]   (located across from [address])    Located on the main floor of the [facility]. Report to our reception desk by the [employer]. Parking is free. Handicap parking is available by the main entrance. You may also park in [place] on Level 2 and enter building on the bridge to [facility]. Take elevator to the main floor. Our  will call you to schedule your procedure within a week. If you do not receive a phone call, please call the  directly at [phone] and leave a message, or call [place] office at [phone]. Date:______________________________    Arrival Time:________________________    Procedure Time:_____________________    Instructions:_____________________________      Bring Photo I.D. and insurance cards. Bring all Medications in the bottles. Pack an overnight bag in case you are required to spend the night. You will need someone to drive you home. The  will instruct you on holding food and drink the night before or morning of your procedure. You are to take your Medications, along with your Cardiac and/or Blood Pressure Medications, with small sips of water on the morning of your Procedure, unless instructed otherwise by your Physician. If you need additional directions please call [phone]. If you have any questions please feel free to call the [address] [place], [place] [address] office at [phone].

## 2023-11-22 NOTE — PROGRESS NOTES
scleral icterus. ENT: No Headaches, hearing loss or vertigo. No mouth sores or sore throat. Cardiovascular: see above  Respiratory: see above  Gastrointestinal: No abdominal pain, appetite loss, blood in stools. Genitourinary: No dysuria, trouble voiding, or hematuria. Musculoskeletal:  No gait disturbance, No weakness or joint complaints. Integumentary: No rash or pruritis. Neurological: No headache or diplopia. No tingling  Psychiatric: yes anxiety, or depression. Endocrine: No temperature intolerance. Hematologic/Lymphatic: No abnormal bruising or bleeding, blood clots or swollen lymph nodes. Allergic/Immunologic: No nasal congestion or hives. PHYSICAL EXAM:      Vitals:    11/22/23 1000   BP: 136/88   Pulse: 84   Resp: 12   SpO2: 94%       Constitutional and General Appearance: alert, cooperative, no distress and appears stated age  HEENT: PERRL, no cervical lymphadenopathy. No masses palpable. Normal oral mucosa  Respiratory:  Normal excursion and expansion without use of accessory muscles  Resp Auscultation: Good respiratory effort. No for increased work of breathing. On auscultation: clear to auscultation bilaterally  Cardiovascular:  Heart tones are crisp and normal. regular S1 and S2.  Jugular venous pulsation Normal  The carotid upstroke is normal in amplitude and contour without delay or bruit   Abdomen:   soft  Bowel sounds present  Extremities:   No edema  Neurological:  Alert and oriented. Cardiac Data:  EKG: NSR, atrial enlargement septal infarction. Labs:     CBC: No results for input(s): \"WBC\", \"HGB\", \"HCT\", \"PLT\" in the last 72 hours. BMP: No results for input(s): \"NA\", \"K\", \"CO2\", \"BUN\", \"CREATININE\", \"LABGLOM\", \"GLUCOSE\" in the last 72 hours. PT/INR: No results for input(s): \"PROTIME\", \"INR\" in the last 72 hours.   FASTING LIPID PANEL:  Lab Results   Component Value Date/Time    HDL 87 11/09/2023 01:50 PM    TRIG 71 11/09/2023 01:50 PM     LIVER PROFILE:No results for

## 2023-12-05 ENCOUNTER — OFFICE VISIT (OUTPATIENT)
Dept: PRIMARY CARE CLINIC | Age: 68
End: 2023-12-05
Payer: MEDICARE

## 2023-12-05 VITALS
RESPIRATION RATE: 16 BRPM | WEIGHT: 120.8 LBS | HEART RATE: 74 BPM | OXYGEN SATURATION: 96 % | DIASTOLIC BLOOD PRESSURE: 82 MMHG | SYSTOLIC BLOOD PRESSURE: 134 MMHG | BODY MASS INDEX: 23.59 KG/M2

## 2023-12-05 DIAGNOSIS — F41.9 ANXIETY: Primary | ICD-10-CM

## 2023-12-05 PROCEDURE — 99213 OFFICE O/P EST LOW 20 MIN: CPT | Performed by: NURSE PRACTITIONER

## 2023-12-05 PROCEDURE — 4004F PT TOBACCO SCREEN RCVD TLK: CPT | Performed by: NURSE PRACTITIONER

## 2023-12-05 PROCEDURE — G8420 CALC BMI NORM PARAMETERS: HCPCS | Performed by: NURSE PRACTITIONER

## 2023-12-05 PROCEDURE — G8484 FLU IMMUNIZE NO ADMIN: HCPCS | Performed by: NURSE PRACTITIONER

## 2023-12-05 PROCEDURE — 1123F ACP DISCUSS/DSCN MKR DOCD: CPT | Performed by: NURSE PRACTITIONER

## 2023-12-05 PROCEDURE — 1090F PRES/ABSN URINE INCON ASSESS: CPT | Performed by: NURSE PRACTITIONER

## 2023-12-05 PROCEDURE — G8400 PT W/DXA NO RESULTS DOC: HCPCS | Performed by: NURSE PRACTITIONER

## 2023-12-05 PROCEDURE — G8427 DOCREV CUR MEDS BY ELIG CLIN: HCPCS | Performed by: NURSE PRACTITIONER

## 2023-12-05 PROCEDURE — 3017F COLORECTAL CA SCREEN DOC REV: CPT | Performed by: NURSE PRACTITIONER

## 2023-12-05 RX ORDER — HYDROXYZINE HYDROCHLORIDE 25 MG/1
25 TABLET, FILM COATED ORAL EVERY 8 HOURS PRN
Qty: 30 TABLET | Refills: 0 | Status: SHIPPED | OUTPATIENT
Start: 2023-12-05 | End: 2023-12-15

## 2023-12-05 ASSESSMENT — ENCOUNTER SYMPTOMS
SORE THROAT: 0
SHORTNESS OF BREATH: 0
NAUSEA: 0
COLOR CHANGE: 0
CHEST TIGHTNESS: 0
ABDOMINAL PAIN: 0
VOMITING: 0
DIARRHEA: 0
RHINORRHEA: 0

## 2023-12-05 NOTE — PROGRESS NOTES
[facility] PRIMARY CARE  [address] [place] [address]  Dept: [phone]  Dept Fax: [phone]    Nithya Pak is a 77 y.o. female who presentstoday for her medical conditions/complaints as noted below.   Nithya Pak is c/o of  Chief Complaint   Patient presents with    Medication Check     Prozac         HPI:     Presents for 1 month f/u for anxiety  Does feel improved since starting prozac 20mg daily   Thinks she may benefit from PRN anxiety medication for episodes of increased stress  Denies SI/HI, rest and appetite are good     Saw cardiology for f/u after abnormal stress test  Has heart cath scheduled   No acute distress in office   She has started ASA 81mg and crestor 20mg       No results found for: \"LABA1C\"          ( goal A1C is < 7)   No components found for: \"LABMICR\"  LDL Cholesterol (mg/dL)   Date Value   11/09/2023 118       (goal LDL is <100)   AST (U/L)   Date Value   11/09/2023 14     ALT (U/L)   Date Value   11/09/2023 11     BUN (mg/dL)   Date Value   11/09/2023 9     BP Readings from Last 3 Encounters:   12/08/23 (!) 143/81   12/05/23 134/82   11/22/23 136/88          ([id number])    Past Medical History:   Diagnosis Date    COPD (chronic obstructive pulmonary disease) ([facility]) 2019    Depression       Past Surgical History:   Procedure Laterality Date    EYE SURGERY  Cataract       Family History   Problem Relation Age of Onset    Unknown Mother     Unknown Father        Social History     Tobacco Use    Smoking status: Every Day     Packs/day: 1.00     Years: 30.00     Additional pack years: 0.00     Total pack years: 30.00     Types: Cigarettes     Start date: 1/1/1992    Smokeless tobacco: Never    Tobacco comments:     Still   Substance Use Topics    Alcohol use: Yes     Comment: Very seldom      Current Outpatient Medications   Medication Sig Dispense Refill    hydrOXYzine HCl (ATARAX) 25 MG tablet Take 1 tablet by mouth every 8 hours as needed

## 2023-12-08 ENCOUNTER — HOSPITAL ENCOUNTER (OUTPATIENT)
Age: 68
Setting detail: OUTPATIENT SURGERY
Discharge: HOME HEALTH | End: 2023-12-08
Attending: INTERNAL MEDICINE | Admitting: INTERNAL MEDICINE
Payer: MEDICARE

## 2023-12-08 VITALS
DIASTOLIC BLOOD PRESSURE: 81 MMHG | OXYGEN SATURATION: 96 % | HEIGHT: 60 IN | HEART RATE: 74 BPM | WEIGHT: 118 LBS | RESPIRATION RATE: 19 BRPM | SYSTOLIC BLOOD PRESSURE: 143 MMHG | BODY MASS INDEX: 23.16 KG/M2 | TEMPERATURE: 98.6 F

## 2023-12-08 DIAGNOSIS — R94.39 ABNORMAL STRESS TEST: ICD-10-CM

## 2023-12-08 DIAGNOSIS — E78.5 HYPERLIPIDEMIA, UNSPECIFIED HYPERLIPIDEMIA TYPE: ICD-10-CM

## 2023-12-08 LAB
ACT BLD: 251 SEC (ref 79–149)
ECHO BSA: 1.51 M2

## 2023-12-08 PROCEDURE — 85347 COAGULATION TIME ACTIVATED: CPT

## 2023-12-08 PROCEDURE — 2580000003 HC RX 258: Performed by: INTERNAL MEDICINE

## 2023-12-08 PROCEDURE — 7100000011 HC PHASE II RECOVERY - ADDTL 15 MIN: Performed by: INTERNAL MEDICINE

## 2023-12-08 PROCEDURE — 2500000003 HC RX 250 WO HCPCS: Performed by: INTERNAL MEDICINE

## 2023-12-08 PROCEDURE — C1874 STENT, COATED/COV W/DEL SYS: HCPCS | Performed by: INTERNAL MEDICINE

## 2023-12-08 PROCEDURE — 99152 MOD SED SAME PHYS/QHP 5/>YRS: CPT | Performed by: INTERNAL MEDICINE

## 2023-12-08 PROCEDURE — 92920 PRQ TRLUML C ANGIOP 1ART&/BR: CPT | Performed by: INTERNAL MEDICINE

## 2023-12-08 PROCEDURE — 99153 MOD SED SAME PHYS/QHP EA: CPT | Performed by: INTERNAL MEDICINE

## 2023-12-08 PROCEDURE — 2709999900 HC NON-CHARGEABLE SUPPLY: Performed by: INTERNAL MEDICINE

## 2023-12-08 PROCEDURE — C9600 PERC DRUG-EL COR STENT SING: HCPCS | Performed by: INTERNAL MEDICINE

## 2023-12-08 PROCEDURE — 7100000010 HC PHASE II RECOVERY - FIRST 15 MIN: Performed by: INTERNAL MEDICINE

## 2023-12-08 PROCEDURE — 92978 ENDOLUMINL IVUS OCT C 1ST: CPT | Performed by: INTERNAL MEDICINE

## 2023-12-08 PROCEDURE — 6360000002 HC RX W HCPCS: Performed by: INTERNAL MEDICINE

## 2023-12-08 PROCEDURE — 93458 L HRT ARTERY/VENTRICLE ANGIO: CPT | Performed by: INTERNAL MEDICINE

## 2023-12-08 PROCEDURE — 6360000004 HC RX CONTRAST MEDICATION: Performed by: INTERNAL MEDICINE

## 2023-12-08 PROCEDURE — C1725 CATH, TRANSLUMIN NON-LASER: HCPCS | Performed by: INTERNAL MEDICINE

## 2023-12-08 PROCEDURE — C1887 CATHETER, GUIDING: HCPCS | Performed by: INTERNAL MEDICINE

## 2023-12-08 DEVICE — STENT CORONARY ONYX FRONTIER RX 3X22 MM ZOTAROLIMUS ELUT: Type: IMPLANTABLE DEVICE | Status: FUNCTIONAL

## 2023-12-08 RX ORDER — NITROGLYCERIN 20 MG/100ML
INJECTION INTRAVENOUS PRN
Status: DISCONTINUED | OUTPATIENT
Start: 2023-12-08 | End: 2023-12-08 | Stop reason: HOSPADM

## 2023-12-08 RX ORDER — VERAPAMIL HYDROCHLORIDE 2.5 MG/ML
INJECTION, SOLUTION INTRAVENOUS PRN
Status: DISCONTINUED | OUTPATIENT
Start: 2023-12-08 | End: 2023-12-08 | Stop reason: HOSPADM

## 2023-12-08 RX ORDER — HEPARIN SODIUM 1000 [USP'U]/ML
INJECTION, SOLUTION INTRAVENOUS; SUBCUTANEOUS PRN
Status: DISCONTINUED | OUTPATIENT
Start: 2023-12-08 | End: 2023-12-08 | Stop reason: HOSPADM

## 2023-12-08 RX ORDER — ROSUVASTATIN CALCIUM 20 MG/1
20 TABLET, COATED ORAL DAILY
Qty: 90 TABLET | Refills: 1 | Status: SHIPPED | OUTPATIENT
Start: 2023-12-08

## 2023-12-08 RX ORDER — FENTANYL CITRATE 50 UG/ML
INJECTION, SOLUTION INTRAMUSCULAR; INTRAVENOUS PRN
Status: DISCONTINUED | OUTPATIENT
Start: 2023-12-08 | End: 2023-12-08 | Stop reason: HOSPADM

## 2023-12-08 RX ORDER — SODIUM CHLORIDE 9 MG/ML
INJECTION, SOLUTION INTRAVENOUS CONTINUOUS PRN
Status: COMPLETED | OUTPATIENT
Start: 2023-12-08 | End: 2023-12-08

## 2023-12-08 RX ORDER — CLOPIDOGREL BISULFATE 75 MG/1
75 TABLET ORAL DAILY
Qty: 30 TABLET | Refills: 3 | Status: SHIPPED | OUTPATIENT
Start: 2023-12-08

## 2023-12-08 RX ORDER — SODIUM CHLORIDE 9 MG/ML
INJECTION, SOLUTION INTRAVENOUS CONTINUOUS
Status: DISCONTINUED | OUTPATIENT
Start: 2023-12-08 | End: 2023-12-08 | Stop reason: HOSPADM

## 2023-12-08 RX ORDER — MIDAZOLAM HYDROCHLORIDE 1 MG/ML
INJECTION INTRAMUSCULAR; INTRAVENOUS PRN
Status: DISCONTINUED | OUTPATIENT
Start: 2023-12-08 | End: 2023-12-08 | Stop reason: HOSPADM

## 2023-12-08 RX ADMIN — SODIUM CHLORIDE: 9 INJECTION, SOLUTION INTRAVENOUS at 11:49

## 2023-12-08 NOTE — PROGRESS NOTES
Patient admitted, consent signed and questions answered. Patient ready for procedure. Call light to reach with side rails up 2 of 2. Bilateral groins clipped with writer and Aj present. family at bedside with patient. History and physical needs updated.

## 2023-12-08 NOTE — PROGRESS NOTES
Patient ate all of meal without difficulty. Right wrist remains without hematoma or drainage. Patient voices no complaints.

## 2023-12-08 NOTE — H&P
[facility]  Procedure History and Physical Update          Patient Name: Stacie Hoffman  MRN:    <[id number]>  YOB: 1955  Date of evaluation:  12/8/2023    Procedure:    Cardiac cath +/- PCI    Indication for procedure:  Abnormal stress test      Please refer to the office note completed by Dr. José Miguel Dumont on 11/22 in the medical record and note that:    [x] I have examined the patient and reviewed the H&P/Consult and there are no changes to be made to the assessment or plan. [] I have examined the patient and reviewed the H&P/Consult and have noted the following changes:    Past Medical History:   Diagnosis Date    COPD (chronic obstructive pulmonary disease) ([address]) 2019    Depression        Past Surgical History:   Procedure Laterality Date    EYE SURGERY  Cataract       Family History   Problem Relation Age of Onset    Unknown Mother     Unknown Father        No Known Allergies    Prior to Admission medications    Medication Sig Start Date End Date Taking? Authorizing Provider   hydrOXYzine HCl (ATARAX) 25 MG tablet Take 1 tablet by mouth every 8 hours as needed for Anxiety 12/5/23 12/15/23  Lynda SIERRA APRN - CNP   rosuvastatin (CRESTOR) 10 MG tablet Take 1 tablet by mouth daily 11/22/23   Leeann Pemberton MD   aspirin 81 MG EC tablet Take 1 tablet by mouth daily 11/22/23   Leeann Pemberton MD   FLUoxetine (PROZAC) 20 MG capsule Take 1 capsule by mouth daily 11/7/23   Yris Newton, APRN - CNP   albuterol sulfate HFA (PROVENTIL HFA) 108 (90 Base) MCG/ACT inhaler Inhale 2 puffs into the lungs every 6 hours as needed for Wheezing 11/8/22   Hakan Fu APRN - CNP         There were no vitals filed for this visit. Constitutional and General Appearance:   alert, cooperative, no distress and appears stated age  HEENT:  PERRL, EOMI  Respiratory:  Normal excursion and expansion without use of accessory muscles  Resp Auscultation:  Good respiratory effort.  No for

## 2023-12-09 NOTE — PROGRESS NOTES
All discharge instructions reviewed, questions answered, paper signed and given copy. Patient discharged per ambulatory with  and belongings.

## 2023-12-11 ENCOUNTER — HOSPITAL ENCOUNTER (OUTPATIENT)
Age: 68
Discharge: HOME | End: 2023-12-13
Attending: INTERNAL MEDICINE
Payer: MEDICARE

## 2023-12-11 ENCOUNTER — TELEPHONE (OUTPATIENT)
Dept: CARDIOLOGY | Age: 68
End: 2023-12-11

## 2023-12-11 VITALS
SYSTOLIC BLOOD PRESSURE: 167 MMHG | DIASTOLIC BLOOD PRESSURE: 82 MMHG | HEIGHT: 60 IN | HEART RATE: 84 BPM | WEIGHT: 118 LBS | BODY MASS INDEX: 23.16 KG/M2

## 2023-12-11 DIAGNOSIS — R06.09 DYSPNEA ON EXERTION: ICD-10-CM

## 2023-12-11 DIAGNOSIS — R00.2 PALPITATIONS: ICD-10-CM

## 2023-12-11 LAB
ECHO AO ROOT DIAM: 2.8 CM
ECHO AO ROOT INDEX: 1.88 CM/M2
ECHO AV AREA PEAK VELOCITY: 2.5 CM2
ECHO AV AREA/BSA PEAK VELOCITY: 1.7 CM2/M2
ECHO AV PEAK GRADIENT: 5 MMHG
ECHO AV PEAK VELOCITY: 1.2 M/S
ECHO AV VELOCITY RATIO: 0.83
ECHO BSA: 1.51 M2
ECHO EST RA PRESSURE: 3 MMHG
ECHO LA AREA 4C: 9.6 CM2
ECHO LA DIAMETER INDEX: 1.81 CM/M2
ECHO LA DIAMETER: 2.7 CM
ECHO LA MAJOR AXIS: 3.4 CM
ECHO LA TO AORTIC ROOT RATIO: 0.96
ECHO LA VOL MOD A4C: 22 ML (ref 22–52)
ECHO LA VOLUME INDEX MOD A4C: 15 ML/M2 (ref 16–34)
ECHO LV E' LATERAL VELOCITY: 7 CM/S
ECHO LV E' SEPTAL VELOCITY: 6 CM/S
ECHO LV EDV A4C: 56 ML
ECHO LV EDV INDEX A4C: 38 ML/M2
ECHO LV EJECTION FRACTION A4C: 64 %
ECHO LV ESV A4C: 20 ML
ECHO LV ESV INDEX A4C: 13 ML/M2
ECHO LV FRACTIONAL SHORTENING: 29 % (ref 28–44)
ECHO LV INTERNAL DIMENSION DIASTOLE INDEX: 2.55 CM/M2
ECHO LV INTERNAL DIMENSION DIASTOLIC: 3.8 CM (ref 3.9–5.3)
ECHO LV INTERNAL DIMENSION SYSTOLIC INDEX: 1.81 CM/M2
ECHO LV INTERNAL DIMENSION SYSTOLIC: 2.7 CM
ECHO LV ISOVOLUMETRIC RELAXATION TIME (IVRT): 88 MS
ECHO LV IVSD: 1.4 CM (ref 0.6–0.9)
ECHO LV MASS 2D: 153.2 G (ref 67–162)
ECHO LV MASS INDEX 2D: 102.8 G/M2 (ref 43–95)
ECHO LV POSTERIOR WALL DIASTOLIC: 1 CM (ref 0.6–0.9)
ECHO LV RELATIVE WALL THICKNESS RATIO: 0.53
ECHO LVOT AREA: 2.8 CM2
ECHO LVOT DIAM: 1.9 CM
ECHO LVOT PEAK GRADIENT: 4 MMHG
ECHO LVOT PEAK VELOCITY: 1 M/S
ECHO MV A VELOCITY: 0.71 M/S
ECHO MV E DECELERATION TIME (DT): 342 MS
ECHO MV E VELOCITY: 0.58 M/S
ECHO MV E/A RATIO: 0.82
ECHO MV E/E' LATERAL: 8.29
ECHO MV E/E' RATIO (AVERAGED): 8.98
ECHO MV MAX VELOCITY: 1 M/S
ECHO MV PEAK GRADIENT: 4 MMHG
ECHO PV MAX VELOCITY: 1.1 M/S
ECHO PV PEAK GRADIENT: 5 MMHG
ECHO RIGHT VENTRICULAR SYSTOLIC PRESSURE (RVSP): 37 MMHG
ECHO TV PEAK GRADIENT: 2 MMHG
ECHO TV REGURGITANT MAX VELOCITY: 2.9 M/S
ECHO TV REGURGITANT PEAK GRADIENT: 34 MMHG

## 2023-12-11 PROCEDURE — 93306 TTE W/DOPPLER COMPLETE: CPT

## 2023-12-11 PROCEDURE — 93227 XTRNL ECG REC<48 HR R&I: CPT | Performed by: INTERNAL MEDICINE

## 2023-12-11 PROCEDURE — 93225 XTRNL ECG REC<48 HRS REC: CPT

## 2023-12-11 PROCEDURE — 93306 TTE W/DOPPLER COMPLETE: CPT | Performed by: INTERNAL MEDICINE

## 2023-12-11 NOTE — TELEPHONE ENCOUNTER
Pt called after cath on 11/8/23. Pt had issues with fatigue after stating the eliquis. Pt became dizzy and very weak and fatigued. so she stopped taking the medication after taking the eliquis a few days. Please advise.       Last Appt:  11/22/2023  Next Appt:   1/10/2024  Med verified in [address] [place] [address]

## 2023-12-11 NOTE — TELEPHONE ENCOUNTER
Called and spoke to patient. Pt stated she stopped the PLAVIX not Eliquis, that was given to her post PCI on 12/8/23. Patient was advised to immediately take the Plavix  again due to recent stent place and advised how important the medication is to take. Patient informed of potential consequences to not taking. Pt verbalized understanding and stated she will start it now and will take it tomorrow morning as well from then on. Pt stated he BP has been in the 130s/80s. patient wants letter to move to another apartment   Received: Today   Message Contents   DEVON Washington/Latha       Patient is asking for a letter so he can move to a bottom apartment, he doesn't think he should be climbing stairs with his heart condition. Pt. #[phone].      Letter prepared and mailed to address on file in demographics.

## 2023-12-12 NOTE — TELEPHONE ENCOUNTER
Patient informed per Dr Rosmery Morton to continue Plavix and advised to come in for appt with NP next week. Patient verbalized understanding and stated that she will continue taking the Plavix and scheduled appt with NP next week 12/20/23 at 11:45am. Pt had no further questions at the time.

## 2023-12-14 LAB — ECHO BSA: 1.51 M2

## 2023-12-15 ENCOUNTER — TELEPHONE (OUTPATIENT)
Dept: CARDIOLOGY | Age: 68
End: 2023-12-15

## 2023-12-15 DIAGNOSIS — R94.31 ABNORMAL HOLTER EXAM: Primary | ICD-10-CM

## 2023-12-15 NOTE — TELEPHONE ENCOUNTER
LM to notify pt of results and Dr. Sharon Lima rx to [facility]. I asked her to call back with questions.

## 2023-12-15 NOTE — TELEPHONE ENCOUNTER
MD [facility] Clinical Staff  Low dose metoprolol Rx sent.           Cardiac holter monitor (<= 48 hours)  Order: [id number]  Status: Final result       Visible to patient: Yes (seen)       Dx: Palpitations    1 Result Note  Details    Reading Physician Reading Date Result Priority   Dewayne Chan MD  [phone] 12/14/2023 Routine     Result Text  Sinus rhythm throughout with average HR 79 bpm  Occasional PVC's ( < 1%); no NSVT noted  Occasional PAC's ( < 1%); 4 runs of non-sustained PAT noted, longest 6 beats  No bradycardia or pauses noted  No symptoms reported

## 2024-01-02 DIAGNOSIS — R94.31 ABNORMAL HOLTER EXAM: ICD-10-CM

## 2024-01-02 RX ORDER — CLOPIDOGREL BISULFATE 75 MG/1
75 TABLET ORAL DAILY
Qty: 90 TABLET | Refills: 4 | Status: SHIPPED | OUTPATIENT
Start: 2024-01-02

## 2024-01-03 ENCOUNTER — PATIENT MESSAGE (OUTPATIENT)
Dept: PRIMARY CARE CLINIC | Age: 69
End: 2024-01-03

## 2024-01-03 DIAGNOSIS — R05.1 ACUTE COUGH: Primary | ICD-10-CM

## 2024-01-03 DIAGNOSIS — R94.31 ABNORMAL HOLTER EXAM: ICD-10-CM

## 2024-01-03 NOTE — TELEPHONE ENCOUNTER
From: Sunita Watts  To: Kita Newton  Sent: 1/3/2024 1:37 PM EST  Subject: Question     I'm sure I have bronchitis. Can you call in something for it. I've had before. Coughed so much I hurt.  Please let me know.  Thank you  Sunita Watts

## 2024-01-04 RX ORDER — PREDNISONE 20 MG/1
20 TABLET ORAL DAILY
Qty: 5 TABLET | Refills: 0 | Status: SHIPPED | OUTPATIENT
Start: 2024-01-04 | End: 2024-01-09

## 2024-01-04 RX ORDER — BENZONATATE 200 MG/1
200 CAPSULE ORAL 3 TIMES DAILY PRN
Qty: 30 CAPSULE | Refills: 0 | Status: SHIPPED | OUTPATIENT
Start: 2024-01-04 | End: 2024-01-11

## 2024-01-16 ENCOUNTER — OFFICE VISIT (OUTPATIENT)
Dept: PRIMARY CARE CLINIC | Age: 69
End: 2024-01-16
Payer: MEDICARE

## 2024-01-16 VITALS
SYSTOLIC BLOOD PRESSURE: 138 MMHG | BODY MASS INDEX: 22.89 KG/M2 | DIASTOLIC BLOOD PRESSURE: 84 MMHG | OXYGEN SATURATION: 95 % | HEART RATE: 62 BPM | WEIGHT: 117.2 LBS | RESPIRATION RATE: 18 BRPM

## 2024-01-16 DIAGNOSIS — R05.8 RESPIRATORY TRACT CONGESTION WITH COUGH: Primary | ICD-10-CM

## 2024-01-16 DIAGNOSIS — R06.02 SHORTNESS OF BREATH: ICD-10-CM

## 2024-01-16 PROCEDURE — 1090F PRES/ABSN URINE INCON ASSESS: CPT | Performed by: NURSE PRACTITIONER

## 2024-01-16 PROCEDURE — G8484 FLU IMMUNIZE NO ADMIN: HCPCS | Performed by: NURSE PRACTITIONER

## 2024-01-16 PROCEDURE — G8420 CALC BMI NORM PARAMETERS: HCPCS | Performed by: NURSE PRACTITIONER

## 2024-01-16 PROCEDURE — 3017F COLORECTAL CA SCREEN DOC REV: CPT | Performed by: NURSE PRACTITIONER

## 2024-01-16 PROCEDURE — G8427 DOCREV CUR MEDS BY ELIG CLIN: HCPCS | Performed by: NURSE PRACTITIONER

## 2024-01-16 PROCEDURE — 99214 OFFICE O/P EST MOD 30 MIN: CPT | Performed by: NURSE PRACTITIONER

## 2024-01-16 PROCEDURE — 4004F PT TOBACCO SCREEN RCVD TLK: CPT | Performed by: NURSE PRACTITIONER

## 2024-01-16 PROCEDURE — 1123F ACP DISCUSS/DSCN MKR DOCD: CPT | Performed by: NURSE PRACTITIONER

## 2024-01-16 PROCEDURE — G8400 PT W/DXA NO RESULTS DOC: HCPCS | Performed by: NURSE PRACTITIONER

## 2024-01-16 RX ORDER — AZITHROMYCIN 250 MG/1
250 TABLET, FILM COATED ORAL SEE ADMIN INSTRUCTIONS
Qty: 6 TABLET | Refills: 0 | Status: SHIPPED | OUTPATIENT
Start: 2024-01-16 | End: 2024-01-21

## 2024-01-16 RX ORDER — GUAIFENESIN 600 MG/1
600 TABLET, EXTENDED RELEASE ORAL 2 TIMES DAILY
Qty: 30 TABLET | Refills: 0 | Status: SHIPPED | OUTPATIENT
Start: 2024-01-16 | End: 2024-01-31

## 2024-01-16 RX ORDER — PREDNISONE 20 MG/1
20 TABLET ORAL DAILY
Qty: 5 TABLET | Refills: 0 | Status: SHIPPED | OUTPATIENT
Start: 2024-01-16 | End: 2024-01-21

## 2024-01-16 ASSESSMENT — ENCOUNTER SYMPTOMS
VOMITING: 0
RHINORRHEA: 0
NAUSEA: 0
SORE THROAT: 0
CHEST TIGHTNESS: 0
DIARRHEA: 0
ABDOMINAL PAIN: 0
COLOR CHANGE: 0
SHORTNESS OF BREATH: 0

## 2024-01-16 ASSESSMENT — PATIENT HEALTH QUESTIONNAIRE - PHQ9
SUM OF ALL RESPONSES TO PHQ QUESTIONS 1-9: 2
SUM OF ALL RESPONSES TO PHQ9 QUESTIONS 1 & 2: 0
9. THOUGHTS THAT YOU WOULD BE BETTER OFF DEAD, OR OF HURTING YOURSELF: 0
SUM OF ALL RESPONSES TO PHQ QUESTIONS 1-9: 2
7. TROUBLE CONCENTRATING ON THINGS, SUCH AS READING THE NEWSPAPER OR WATCHING TELEVISION: 0
2. FEELING DOWN, DEPRESSED OR HOPELESS: 0
8. MOVING OR SPEAKING SO SLOWLY THAT OTHER PEOPLE COULD HAVE NOTICED. OR THE OPPOSITE, BEING SO FIGETY OR RESTLESS THAT YOU HAVE BEEN MOVING AROUND A LOT MORE THAN USUAL: 0
SUM OF ALL RESPONSES TO PHQ QUESTIONS 1-9: 2
SUM OF ALL RESPONSES TO PHQ QUESTIONS 1-9: 2
5. POOR APPETITE OR OVEREATING: 0
4. FEELING TIRED OR HAVING LITTLE ENERGY: 1
3. TROUBLE FALLING OR STAYING ASLEEP: 1
1. LITTLE INTEREST OR PLEASURE IN DOING THINGS: 0
6. FEELING BAD ABOUT YOURSELF - OR THAT YOU ARE A FAILURE OR HAVE LET YOURSELF OR YOUR FAMILY DOWN: 0
10. IF YOU CHECKED OFF ANY PROBLEMS, HOW DIFFICULT HAVE THESE PROBLEMS MADE IT FOR YOU TO DO YOUR WORK, TAKE CARE OF THINGS AT HOME, OR GET ALONG WITH OTHER PEOPLE: 0

## 2024-01-16 NOTE — PROGRESS NOTES
[facility]  [facility] PRIMARY CARE  [facility] [address]  Dept: [phone]  Dept Fax: [phone]    Sunita Watts is a 68 y.o. female who presentstoday for her medical conditions/complaints as noted below.  Sunita Watts is c/o of  Chief Complaint   Patient presents with    Cough     With congestion and wheezing. Effecting sleep. Ongoing since 12/20/23         HPI:     Presents with acute complaint of cough, congestion and wheezing  Symptoms started about 3 weeks ago  Coincidentally, after she got flu and pneumonia vaccine   She has had some mild chills  Cough is not productive, denies SOB   Tried tessalon and steroid, feels steroid helped but no improvement with tessalon   No acute distress in office         No results found for: \"LABA1C\"          ( goal A1C is < 7)   No components found for: \"LABMICR\"  LDL Cholesterol (mg/dL)   Date Value   11/09/2023 118       (goal LDL is <100)   AST (U/L)   Date Value   11/09/2023 14     ALT (U/L)   Date Value   11/09/2023 11     BUN (mg/dL)   Date Value   11/09/2023 9     BP Readings from Last 3 Encounters:   01/16/24 138/84   12/20/23 (!) 150/84   12/11/23 (!) 167/82          ([id number])    Past Medical History:   Diagnosis Date    COPD (chronic obstructive pulmonary disease) ([facility]) 2019    Depression       Past Surgical History:   Procedure Laterality Date    CARDIAC PROCEDURE N/A 12/8/2023    metcalf / Left heart cath / coronary angiography performed by Geetha Metcalf MD at [facility] CARDIAC CATH LAB    CARDIAC PROCEDURE N/A 12/8/2023    Percutaneous coronary intervention performed by Geetha Metcalf MD at [facility] CARDIAC CATH LAB    EYE SURGERY  Cataract       Family History   Problem Relation Age of Onset    Unknown Mother     Unknown Father        Social History     Tobacco Use    Smoking status: Every Day     Current packs/day: 1.00     Average packs/day: 1 pack/day for 32.0 years (32.0 ttl pk-yrs)     Types: Cigarettes

## 2024-01-30 DIAGNOSIS — F33.1 MODERATE EPISODE OF RECURRENT MAJOR DEPRESSIVE DISORDER (HCC): ICD-10-CM

## 2024-01-30 RX ORDER — FLUOXETINE HYDROCHLORIDE 20 MG/1
20 CAPSULE ORAL DAILY
Qty: 30 CAPSULE | Refills: 2 | Status: SHIPPED | OUTPATIENT
Start: 2024-01-30

## 2024-02-09 DIAGNOSIS — F33.1 MODERATE EPISODE OF RECURRENT MAJOR DEPRESSIVE DISORDER (HCC): ICD-10-CM

## 2024-02-09 RX ORDER — FLUOXETINE HYDROCHLORIDE 20 MG/1
20 CAPSULE ORAL DAILY
Qty: 90 CAPSULE | Refills: 1 | Status: SHIPPED | OUTPATIENT
Start: 2024-02-09

## 2024-03-01 DIAGNOSIS — E78.5 HYPERLIPIDEMIA, UNSPECIFIED HYPERLIPIDEMIA TYPE: ICD-10-CM

## 2024-03-01 RX ORDER — ROSUVASTATIN CALCIUM 20 MG/1
20 TABLET, COATED ORAL DAILY
Qty: 90 TABLET | Refills: 4 | Status: SHIPPED | OUTPATIENT
Start: 2024-03-01

## 2024-06-05 ENCOUNTER — OFFICE VISIT (OUTPATIENT)
Dept: CARDIOLOGY | Age: 69
End: 2024-06-05
Payer: MEDICARE

## 2024-06-05 VITALS
HEART RATE: 57 BPM | DIASTOLIC BLOOD PRESSURE: 66 MMHG | SYSTOLIC BLOOD PRESSURE: 136 MMHG | WEIGHT: 122 LBS | BODY MASS INDEX: 23.83 KG/M2

## 2024-06-05 DIAGNOSIS — R00.2 PALPITATIONS: ICD-10-CM

## 2024-06-05 DIAGNOSIS — R94.31 ABNORMAL HOLTER EXAM: ICD-10-CM

## 2024-06-05 DIAGNOSIS — E78.5 HYPERLIPIDEMIA, UNSPECIFIED HYPERLIPIDEMIA TYPE: Primary | ICD-10-CM

## 2024-06-05 PROCEDURE — G8428 CUR MEDS NOT DOCUMENT: HCPCS

## 2024-06-05 PROCEDURE — 99214 OFFICE O/P EST MOD 30 MIN: CPT

## 2024-06-05 PROCEDURE — 93010 ELECTROCARDIOGRAM REPORT: CPT

## 2024-06-05 PROCEDURE — 4004F PT TOBACCO SCREEN RCVD TLK: CPT

## 2024-06-05 PROCEDURE — 3017F COLORECTAL CA SCREEN DOC REV: CPT

## 2024-06-05 PROCEDURE — G8420 CALC BMI NORM PARAMETERS: HCPCS

## 2024-06-05 PROCEDURE — 1090F PRES/ABSN URINE INCON ASSESS: CPT

## 2024-06-05 PROCEDURE — 93005 ELECTROCARDIOGRAM TRACING: CPT

## 2024-06-05 PROCEDURE — G8400 PT W/DXA NO RESULTS DOC: HCPCS

## 2024-06-05 PROCEDURE — 99212 OFFICE O/P EST SF 10 MIN: CPT

## 2024-06-05 PROCEDURE — 1123F ACP DISCUSS/DSCN MKR DOCD: CPT

## 2024-06-05 NOTE — PROGRESS NOTES
Today's Date: 6/5/2024  Patient's Name: Sunita Watts  Patient's age: 69 y.o., 1955    Subjective:  Sunita Watts follow up   Patient seen and examined in office today. Denies anginal type chest pain. Denies shortness of breath or dyspnea on exertion. No lightheadedness, dizziness,  nausea/vomiting, diaphoresis, or palpitations. Reports complete medication compliance. Denies any noticeable peripheral edema or unexplained sudden weight gain/loss. Medical history reviewed.       Past Medical History:   has a past medical history of COPD (chronic obstructive pulmonary disease) (HCC) and Depression.    Past Surgical History:   has a past surgical history that includes eye surgery (Cataract); Cardiac procedure (N/A, 12/8/2023); and Cardiac procedure (N/A, 12/8/2023).    Home Medications:  Prior to Admission medications    Medication Sig Start Date End Date Taking? Authorizing Provider   rosuvastatin (CRESTOR) 20 MG tablet Take 1 tablet by mouth daily 3/1/24   Libby Marie APRN - CNP   FLUoxetine (PROZAC) 20 MG capsule Take 1 capsule by mouth daily 2/9/24   Kita Newton APRN - CNP   metoprolol tartrate (LOPRESSOR) 25 MG tablet Take 0.5 tablets by mouth 2 times daily 1/2/24   Jacqueline Mendieta S,    clopidogrel (PLAVIX) 75 MG tablet Take 1 tablet by mouth daily 1/2/24   Libby Maire APRN - CNP   aspirin 81 MG EC tablet Take 1 tablet by mouth daily 11/22/23   Ayad Spears MD   albuterol sulfate HFA (PROVENTIL HFA) 108 (90 Base) MCG/ACT inhaler Inhale 2 puffs into the lungs every 6 hours as needed for Wheezing 11/8/22   Hakan Fu APRN - CNP       Allergies:  Patient has no known allergies.    Social History:   reports that she has been smoking cigarettes. She started smoking about 32 years ago. She has a 32.4 pack-year smoking history. She has never used smokeless tobacco. She reports current alcohol use. She reports that she does not use drugs.     Family History: family

## 2024-08-06 DIAGNOSIS — F33.1 MODERATE EPISODE OF RECURRENT MAJOR DEPRESSIVE DISORDER (HCC): ICD-10-CM

## 2024-08-07 DIAGNOSIS — F33.1 MODERATE EPISODE OF RECURRENT MAJOR DEPRESSIVE DISORDER (HCC): ICD-10-CM

## 2024-08-07 RX ORDER — FLUOXETINE HYDROCHLORIDE 20 MG/1
20 CAPSULE ORAL DAILY
Qty: 90 CAPSULE | Refills: 1 | Status: SHIPPED | OUTPATIENT
Start: 2024-08-07

## 2024-10-10 ENCOUNTER — OFFICE VISIT (OUTPATIENT)
Dept: PRIMARY CARE CLINIC | Age: 69
End: 2024-10-10
Payer: MEDICARE

## 2024-10-10 VITALS
RESPIRATION RATE: 15 BRPM | SYSTOLIC BLOOD PRESSURE: 120 MMHG | DIASTOLIC BLOOD PRESSURE: 82 MMHG | HEART RATE: 62 BPM | WEIGHT: 122 LBS | OXYGEN SATURATION: 98 % | BODY MASS INDEX: 23.83 KG/M2

## 2024-10-10 DIAGNOSIS — F33.2 SEVERE EPISODE OF RECURRENT MAJOR DEPRESSIVE DISORDER, WITHOUT PSYCHOTIC FEATURES (HCC): Primary | ICD-10-CM

## 2024-10-10 DIAGNOSIS — F41.9 ANXIETY: ICD-10-CM

## 2024-10-10 PROCEDURE — 1090F PRES/ABSN URINE INCON ASSESS: CPT | Performed by: NURSE PRACTITIONER

## 2024-10-10 PROCEDURE — 3017F COLORECTAL CA SCREEN DOC REV: CPT | Performed by: NURSE PRACTITIONER

## 2024-10-10 PROCEDURE — G8420 CALC BMI NORM PARAMETERS: HCPCS | Performed by: NURSE PRACTITIONER

## 2024-10-10 PROCEDURE — G8400 PT W/DXA NO RESULTS DOC: HCPCS | Performed by: NURSE PRACTITIONER

## 2024-10-10 PROCEDURE — G8484 FLU IMMUNIZE NO ADMIN: HCPCS | Performed by: NURSE PRACTITIONER

## 2024-10-10 PROCEDURE — 1123F ACP DISCUSS/DSCN MKR DOCD: CPT | Performed by: NURSE PRACTITIONER

## 2024-10-10 PROCEDURE — G8427 DOCREV CUR MEDS BY ELIG CLIN: HCPCS | Performed by: NURSE PRACTITIONER

## 2024-10-10 PROCEDURE — 4004F PT TOBACCO SCREEN RCVD TLK: CPT | Performed by: NURSE PRACTITIONER

## 2024-10-10 PROCEDURE — 99214 OFFICE O/P EST MOD 30 MIN: CPT | Performed by: NURSE PRACTITIONER

## 2024-10-10 RX ORDER — BUPROPION HYDROCHLORIDE 150 MG/1
150 TABLET ORAL EVERY MORNING
Qty: 30 TABLET | Refills: 2 | Status: SHIPPED | OUTPATIENT
Start: 2024-10-10

## 2024-10-10 ASSESSMENT — PATIENT HEALTH QUESTIONNAIRE - PHQ9
SUM OF ALL RESPONSES TO PHQ QUESTIONS 1-9: 9
2. FEELING DOWN, DEPRESSED OR HOPELESS: NEARLY EVERY DAY
SUM OF ALL RESPONSES TO PHQ QUESTIONS 1-9: 9
4. FEELING TIRED OR HAVING LITTLE ENERGY: MORE THAN HALF THE DAYS
9. THOUGHTS THAT YOU WOULD BE BETTER OFF DEAD, OR OF HURTING YOURSELF: NOT AT ALL
1. LITTLE INTEREST OR PLEASURE IN DOING THINGS: SEVERAL DAYS
SUM OF ALL RESPONSES TO PHQ QUESTIONS 1-9: 9
SUM OF ALL RESPONSES TO PHQ QUESTIONS 1-9: 9
5. POOR APPETITE OR OVEREATING: NOT AT ALL
7. TROUBLE CONCENTRATING ON THINGS, SUCH AS READING THE NEWSPAPER OR WATCHING TELEVISION: NOT AT ALL
6. FEELING BAD ABOUT YOURSELF - OR THAT YOU ARE A FAILURE OR HAVE LET YOURSELF OR YOUR FAMILY DOWN: NOT AT ALL
SUM OF ALL RESPONSES TO PHQ9 QUESTIONS 1 & 2: 4
3. TROUBLE FALLING OR STAYING ASLEEP: NEARLY EVERY DAY
8. MOVING OR SPEAKING SO SLOWLY THAT OTHER PEOPLE COULD HAVE NOTICED. OR THE OPPOSITE, BEING SO FIGETY OR RESTLESS THAT YOU HAVE BEEN MOVING AROUND A LOT MORE THAN USUAL: NOT AT ALL

## 2024-10-10 ASSESSMENT — ENCOUNTER SYMPTOMS
NAUSEA: 0
DIARRHEA: 0
CHEST TIGHTNESS: 0
VOMITING: 0
COLOR CHANGE: 0
ABDOMINAL PAIN: 0
SORE THROAT: 0
RHINORRHEA: 0
SHORTNESS OF BREATH: 0

## 2024-10-10 NOTE — PROGRESS NOTES
[facility]  [facility]  [address] [place] [address] [address]  [address]  [facility] [address]  Dept: [phone]  Dept Fax: [phone]    Sunita Watts is a 69 y.o. female who presentstoday for her medical conditions/complaints as noted below.  Sunita Watts is c/o of  Chief Complaint   Patient presents with    Depression     Discuss Medication- Prozac. Would like to wean off. Would like to discuss alternative         HPI:     Presents to discuss persistent depressive symptoms   Accompanied by her    Does not feel symptoms are controlled with prozac  In past, tried lexapro without improvement   Denies SI/HI. Has low mood and lack of motivation. Has occasional anxiety but not significant   reports she used to be very active, cleaning the house, cooking and going out with him and doing things  Over the last few years, is not doing those things anymore and does not seem like her self   Sits in her recliner most of the day  Thinks this got worse during covid and then had cardiac stent placed in LAD   Physically, feels improved. Working to quit smoking. Following with cardiology regularly   Remains on ASA, plavix, BB and crestor             No results found for: \"LABA1C\"          ( goal A1C is < 7)   No components found for: \"LABMICR\"  No components found for: \"LDLCHOLESTEROL\", \"LDLCALC\"    (goal LDL is <100)   AST (U/L)   Date Value   11/09/2023 14     ALT (U/L)   Date Value   11/09/2023 11     BUN (mg/dL)   Date Value   11/09/2023 9     BP Readings from Last 3 Encounters:   10/10/24 120/82   06/05/24 136/66   01/16/24 138/84          ([id number])    Past Medical History:   Diagnosis Date    COPD (chronic obstructive pulmonary disease) (HCC) 2019    Depression       Past Surgical History:   Procedure Laterality Date    CARDIAC PROCEDURE N/A 12/8/2023    metcalf / Left heart cath / coronary angiography performed by Geetha Metcalf MD at [facility], [place] CARDIAC CATH LAB    CARDIAC

## 2024-10-15 ENCOUNTER — PATIENT MESSAGE (OUTPATIENT)
Dept: PRIMARY CARE CLINIC | Age: 69
End: 2024-10-15

## 2024-10-28 ENCOUNTER — NURSE ONLY (OUTPATIENT)
Dept: PRIMARY CARE CLINIC | Age: 69
End: 2024-10-28
Payer: MEDICARE

## 2024-10-28 DIAGNOSIS — Z23 NEED FOR INFLUENZA VACCINATION: Primary | ICD-10-CM

## 2024-10-28 PROCEDURE — 90653 IIV ADJUVANT VACCINE IM: CPT | Performed by: NURSE PRACTITIONER

## 2024-10-28 PROCEDURE — G0008 ADMIN INFLUENZA VIRUS VAC: HCPCS | Performed by: NURSE PRACTITIONER

## 2024-11-21 ENCOUNTER — OFFICE VISIT (OUTPATIENT)
Dept: PRIMARY CARE CLINIC | Age: 69
End: 2024-11-21

## 2024-11-21 VITALS
RESPIRATION RATE: 18 BRPM | DIASTOLIC BLOOD PRESSURE: 80 MMHG | SYSTOLIC BLOOD PRESSURE: 118 MMHG | OXYGEN SATURATION: 98 % | HEIGHT: 61 IN | BODY MASS INDEX: 23.94 KG/M2 | WEIGHT: 126.8 LBS | HEART RATE: 50 BPM

## 2024-11-21 DIAGNOSIS — Z00.00 ENCOUNTER FOR MEDICARE ANNUAL WELLNESS EXAM: Primary | ICD-10-CM

## 2024-11-21 DIAGNOSIS — Z13.0 SCREENING, ANEMIA, DEFICIENCY, IRON: ICD-10-CM

## 2024-11-21 DIAGNOSIS — J44.9 CHRONIC OBSTRUCTIVE PULMONARY DISEASE, UNSPECIFIED COPD TYPE (HCC): ICD-10-CM

## 2024-11-21 DIAGNOSIS — Z13.1 SCREENING FOR DIABETES MELLITUS (DM): ICD-10-CM

## 2024-11-21 DIAGNOSIS — Z13.6 SCREENING FOR CARDIOVASCULAR CONDITION: ICD-10-CM

## 2024-11-21 DIAGNOSIS — Z87.891 PERSONAL HISTORY OF TOBACCO USE: ICD-10-CM

## 2024-11-21 DIAGNOSIS — Z00.00 MEDICARE ANNUAL WELLNESS VISIT, SUBSEQUENT: ICD-10-CM

## 2024-11-21 DIAGNOSIS — R05.3 CHRONIC COUGH: ICD-10-CM

## 2024-11-21 DIAGNOSIS — F41.9 ANXIETY: ICD-10-CM

## 2024-11-21 DIAGNOSIS — F33.1 MODERATE EPISODE OF RECURRENT MAJOR DEPRESSIVE DISORDER (HCC): ICD-10-CM

## 2024-11-21 DIAGNOSIS — Z13.29 SCREENING FOR THYROID DISORDER: ICD-10-CM

## 2024-11-21 RX ORDER — ESCITALOPRAM OXALATE 5 MG/1
5 TABLET ORAL DAILY
Qty: 30 TABLET | Refills: 2 | Status: SHIPPED | OUTPATIENT
Start: 2024-11-21

## 2024-11-21 RX ORDER — PREDNISONE 10 MG/1
10 TABLET ORAL DAILY
Qty: 5 TABLET | Refills: 0 | Status: SHIPPED | OUTPATIENT
Start: 2024-11-21 | End: 2024-11-26

## 2024-11-21 SDOH — ECONOMIC STABILITY: FOOD INSECURITY: WITHIN THE PAST 12 MONTHS, THE FOOD YOU BOUGHT JUST DIDN'T LAST AND YOU DIDN'T HAVE MONEY TO GET MORE.: NEVER TRUE

## 2024-11-21 SDOH — ECONOMIC STABILITY: FOOD INSECURITY: WITHIN THE PAST 12 MONTHS, YOU WORRIED THAT YOUR FOOD WOULD RUN OUT BEFORE YOU GOT MONEY TO BUY MORE.: NEVER TRUE

## 2024-11-21 SDOH — ECONOMIC STABILITY: INCOME INSECURITY: HOW HARD IS IT FOR YOU TO PAY FOR THE VERY BASICS LIKE FOOD, HOUSING, MEDICAL CARE, AND HEATING?: NOT HARD AT ALL

## 2024-11-21 ASSESSMENT — LIFESTYLE VARIABLES
HOW OFTEN DO YOU HAVE A DRINK CONTAINING ALCOHOL: MONTHLY OR LESS
HOW MANY STANDARD DRINKS CONTAINING ALCOHOL DO YOU HAVE ON A TYPICAL DAY: 1 OR 2

## 2024-11-21 ASSESSMENT — PATIENT HEALTH QUESTIONNAIRE - PHQ9
SUM OF ALL RESPONSES TO PHQ QUESTIONS 1-9: 6
3. TROUBLE FALLING OR STAYING ASLEEP: SEVERAL DAYS
4. FEELING TIRED OR HAVING LITTLE ENERGY: SEVERAL DAYS
SUM OF ALL RESPONSES TO PHQ QUESTIONS 1-9: 6
7. TROUBLE CONCENTRATING ON THINGS, SUCH AS READING THE NEWSPAPER OR WATCHING TELEVISION: NOT AT ALL
1. LITTLE INTEREST OR PLEASURE IN DOING THINGS: SEVERAL DAYS
5. POOR APPETITE OR OVEREATING: NOT AT ALL
10. IF YOU CHECKED OFF ANY PROBLEMS, HOW DIFFICULT HAVE THESE PROBLEMS MADE IT FOR YOU TO DO YOUR WORK, TAKE CARE OF THINGS AT HOME, OR GET ALONG WITH OTHER PEOPLE: SOMEWHAT DIFFICULT
8. MOVING OR SPEAKING SO SLOWLY THAT OTHER PEOPLE COULD HAVE NOTICED. OR THE OPPOSITE, BEING SO FIGETY OR RESTLESS THAT YOU HAVE BEEN MOVING AROUND A LOT MORE THAN USUAL: NOT AT ALL
9. THOUGHTS THAT YOU WOULD BE BETTER OFF DEAD, OR OF HURTING YOURSELF: NOT AT ALL
SUM OF ALL RESPONSES TO PHQ9 QUESTIONS 1 & 2: 4
6. FEELING BAD ABOUT YOURSELF - OR THAT YOU ARE A FAILURE OR HAVE LET YOURSELF OR YOUR FAMILY DOWN: NOT AT ALL
2. FEELING DOWN, DEPRESSED OR HOPELESS: NEARLY EVERY DAY

## 2024-11-21 NOTE — PROGRESS NOTES
Objective   Vitals:    11/21/24 1428   BP: 118/80   Pulse: 50   Resp: 18   SpO2: 98%   Weight: 57.5 kg (126 lb 12.8 oz)   Height: 1.537 m (5' 0.5\")      Body mass index is 24.36 kg/m².      Physical Exam              No Known Allergies  Prior to Visit Medications    Medication Sig Taking? Authorizing Provider   escitalopram (LEXAPRO) 5 MG tablet Take 1 tablet by mouth daily Yes Kita Newton APRN - CNP   predniSONE (DELTASONE) 10 MG tablet Take 1 tablet by mouth daily for 5 days Yes Kita Newton APRN - CNP   rosuvastatin (CRESTOR) 20 MG tablet Take 1 tablet by mouth daily Yes Libby Marie APRN - CNP   metoprolol tartrate (LOPRESSOR) 25 MG tablet Take 0.5 tablets by mouth 2 times daily Yes Jacqueline Mendieta SDO   clopidogrel (PLAVIX) 75 MG tablet Take 1 tablet by mouth daily Yes Libby Marie APRN - CNP   aspirin 81 MG EC tablet Take 1 tablet by mouth daily Yes Ayad Spears MD   albuterol sulfate HFA (PROVENTIL HFA) 108 (90 Base) MCG/ACT inhaler Inhale 2 puffs into the lungs every 6 hours as needed for Wheezing Yes Hakan Fu APRN - CNP       CareTeam (Including outside providers/suppliers regularly involved in providing care):   Patient Care Team:  Kita Newton APRN - CNP as PCP - General (Nurse Practitioner)  Kita Newton APRN - CNP as PCP - Empaneled Provider      Reviewed and updated this visit:  Tobacco  Allergies  Meds  Med Hx  Surg Hx  Soc Hx  Fam Hx            The patient (or guardian, if applicable) and other individuals in attendance with the patient were advised that Artificial Intelligence will be utilized during this visit to record and process the conversation to generate a clinical note. The patient (or guardian, if applicable) and other individuals in attendance at the appointment consented to the use of AI, including the recording.

## 2024-11-21 NOTE — PATIENT INSTRUCTIONS
device in the bathroom with you.   Where can you learn more?  Go to https://[email or web address].net/patientEd and enter G117 to learn more about \"Preventing Falls: Care Instructions.\"  Current as of: July 17, 2023  Content Version: 14.2  © 2024 [employer].   Care instructions adapted under license by [employer]. If you have questions about a medical condition or this instruction, always ask your healthcare professional. [employer] disclaims any warranty or liability for your use of this information.           Learning About Mindfulness for Stress  What are mindfulness and stress?     Stress is your body's response to a hard situation. Your body can have a physical, emotional, or mental response. A lot of things can cause stress. You may feel stress when you go on a job interview, take a test, or run a race. This kind of short-term stress is normal and even useful. It can help you if you need to work hard or react quickly.  Stress also can last a long time. Long-term stress is caused by stressful situations or events. Examples of long-term stress include long-term health problems, ongoing problems at work, and conflicts in your family. Long-term stress can harm your health.  Mindfulness is a focus only on things happening in the present moment. It's a process of purposefully paying attention to and being aware of your surroundings, your emotions, your thoughts, and how your body feels. You are aware of these things, but you aren't judging these experiences as \"good\" or \"bad.\" Mindfulness can help you learn to calm your mind and body to help you cope with illness, pain, and stress.  How does mindfulness help to relieve stress?  Mindfulness can help quiet your mind and relax your body. Studies show that it can help some people sleep better, feel less anxious, and bring their blood pressure down. And it's been shown to help some people live and cope better with certain health problems like heart

## 2024-12-02 VITALS — HEIGHT: 61 IN | WEIGHT: 126 LBS | BODY MASS INDEX: 23.79 KG/M2

## 2024-12-02 NOTE — PROGRESS NOTES
After obtaining consent, and per orders of Kita Newton, injection of high dose flu given in Left arm by Keith Pollack MA. Patient instructed to remain in clinic for 20 minutes afterwards, and to report any adverse reaction to me immediately.

## 2024-12-04 ENCOUNTER — TELEMEDICINE (OUTPATIENT)
Dept: PRIMARY CARE CLINIC | Age: 69
End: 2024-12-04

## 2024-12-04 DIAGNOSIS — J06.9 URI WITH COUGH AND CONGESTION: Primary | ICD-10-CM

## 2024-12-04 RX ORDER — AZITHROMYCIN 250 MG/1
TABLET, FILM COATED ORAL
Qty: 6 TABLET | Refills: 0 | Status: SHIPPED | OUTPATIENT
Start: 2024-12-04 | End: 2024-12-14

## 2024-12-04 ASSESSMENT — ENCOUNTER SYMPTOMS
SHORTNESS OF BREATH: 1
COUGH: 1
DIARRHEA: 0
WHEEZING: 0
COLOR CHANGE: 0
VOMITING: 0
RHINORRHEA: 0
CHEST TIGHTNESS: 1
ABDOMINAL PAIN: 0
NAUSEA: 0
SORE THROAT: 0

## 2024-12-04 NOTE — PROGRESS NOTES
PHYLLIS LANIERN - CNP   aspirin 81 MG EC tablet Take 1 tablet by mouth daily Yes Ayad Spears MD   albuterol sulfate HFA (PROVENTIL HFA) 108 (90 Base) MCG/ACT inhaler Inhale 2 puffs into the lungs every 6 hours as needed for Wheezing Yes Hakan Fu APRN - CNP       Social History     Tobacco Use    Smoking status: Every Day     Current packs/day: 1.00     Average packs/day: 1 pack/day for 32.9 years (32.9 ttl pk-yrs)     Types: Cigarettes     Start date: 1/1/1992    Smokeless tobacco: Never    Tobacco comments:     Pt down to 3 cigarettes a day    Vaping Use    Vaping status: Never Used   Substance Use Topics    Alcohol use: Yes     Comment: Very seldom    Drug use: Never            PHYSICAL EXAMINATION:  [ INSTRUCTIONS:  \"[x]\" Indicates a positive item  \"[]\" Indicates a negative item  -- DELETE ALL ITEMS NOT EXAMINED]  Vital Signs: (As obtained by patient/caregiver or practitioner observation)    Blood pressure-  Heart rate-    Respiratory rate-    Temperature-  Pulse oximetry-     Constitutional: [x] Appears well-developed and well-nourished [x] No apparent distress      [] Abnormal-   Mental status  [x] Alert and awake  [x] Oriented to person/place/time [x]Able to follow commands      Eyes:  EOM    []  Normal  [] Abnormal-  Sclera  []  Normal  [] Abnormal -         Discharge []  None visible  [] Abnormal -    HENT:   [x] Normocephalic, atraumatic.  [] Abnormal   [] Mouth/Throat: Mucous membranes are moist.     External Ears [x] Normal  [] Abnormal-     Neck: [x] No visualized mass     Pulmonary/Chest: [x] Respiratory effort normal.  [x] No visualized signs of difficulty breathing or respiratory distress        [] Abnormal-      Musculoskeletal:   [x] Normal gait with no signs of ataxia         [] Normal range of motion of neck        [] Abnormal-       Neurological:        [x] No Facial Asymmetry (Cranial nerve 7 motor function) (limited exam to video visit)          [] No gaze palsy        [] Abnormal-

## 2024-12-18 ENCOUNTER — OFFICE VISIT (OUTPATIENT)
Dept: CARDIOLOGY | Age: 69
End: 2024-12-18
Payer: MEDICARE

## 2024-12-18 VITALS
SYSTOLIC BLOOD PRESSURE: 144 MMHG | BODY MASS INDEX: 26.11 KG/M2 | WEIGHT: 133 LBS | RESPIRATION RATE: 12 BRPM | HEIGHT: 60 IN | DIASTOLIC BLOOD PRESSURE: 92 MMHG

## 2024-12-18 DIAGNOSIS — E78.5 HYPERLIPIDEMIA, UNSPECIFIED HYPERLIPIDEMIA TYPE: ICD-10-CM

## 2024-12-18 DIAGNOSIS — I50.9 ACUTE CONGESTIVE HEART FAILURE, UNSPECIFIED HEART FAILURE TYPE (HCC): ICD-10-CM

## 2024-12-18 DIAGNOSIS — I25.10 CORONARY ARTERY DISEASE INVOLVING NATIVE CORONARY ARTERY OF NATIVE HEART WITHOUT ANGINA PECTORIS: Primary | ICD-10-CM

## 2024-12-18 DIAGNOSIS — I10 PRIMARY HYPERTENSION: ICD-10-CM

## 2024-12-18 PROCEDURE — 1159F MED LIST DOCD IN RCRD: CPT | Performed by: INTERNAL MEDICINE

## 2024-12-18 PROCEDURE — G8419 CALC BMI OUT NRM PARAM NOF/U: HCPCS | Performed by: INTERNAL MEDICINE

## 2024-12-18 PROCEDURE — G8482 FLU IMMUNIZE ORDER/ADMIN: HCPCS | Performed by: INTERNAL MEDICINE

## 2024-12-18 PROCEDURE — 3077F SYST BP >= 140 MM HG: CPT | Performed by: INTERNAL MEDICINE

## 2024-12-18 PROCEDURE — 1090F PRES/ABSN URINE INCON ASSESS: CPT | Performed by: INTERNAL MEDICINE

## 2024-12-18 PROCEDURE — 99214 OFFICE O/P EST MOD 30 MIN: CPT | Performed by: INTERNAL MEDICINE

## 2024-12-18 PROCEDURE — 3080F DIAST BP >= 90 MM HG: CPT | Performed by: INTERNAL MEDICINE

## 2024-12-18 PROCEDURE — 4004F PT TOBACCO SCREEN RCVD TLK: CPT | Performed by: INTERNAL MEDICINE

## 2024-12-18 PROCEDURE — G8427 DOCREV CUR MEDS BY ELIG CLIN: HCPCS | Performed by: INTERNAL MEDICINE

## 2024-12-18 PROCEDURE — 3017F COLORECTAL CA SCREEN DOC REV: CPT | Performed by: INTERNAL MEDICINE

## 2024-12-18 PROCEDURE — 93010 ELECTROCARDIOGRAM REPORT: CPT | Performed by: INTERNAL MEDICINE

## 2024-12-18 PROCEDURE — G8400 PT W/DXA NO RESULTS DOC: HCPCS | Performed by: INTERNAL MEDICINE

## 2024-12-18 PROCEDURE — 1123F ACP DISCUSS/DSCN MKR DOCD: CPT | Performed by: INTERNAL MEDICINE

## 2024-12-18 PROCEDURE — 93005 ELECTROCARDIOGRAM TRACING: CPT | Performed by: INTERNAL MEDICINE

## 2024-12-18 RX ORDER — FUROSEMIDE 20 MG/1
20 TABLET ORAL DAILY
Qty: 30 TABLET | Refills: 5 | Status: SHIPPED | OUTPATIENT
Start: 2024-12-18

## 2024-12-18 NOTE — PROGRESS NOTES
hours.  FASTING LIPID PANEL:  Lab Results   Component Value Date/Time    HDL 87 11/09/2023 01:50 PM    TRIG 71 11/09/2023 01:50 PM     LIVER PROFILE:No results for input(s): \"AST\", \"ALT\", \"LABALBU\" in the last 72 hours.    Problem List:  Patient Active Problem List   Diagnosis    Elevated blood pressure reading      Nuclear stress test 11/16/23    Stress Combined Conclusion: The study is most consistent with myocardial ischemia. Findings suggest a moderate risk of cardiac events.    Perfusion Defect: There is a moderate severity left ventricular stress reversible perfusion defect that is small to medium in size present in the anteroapical and apex segment(s).    Perfusion Conclusion: TID ratio is 1.05.    ECG: Resting ECG demonstrates normal sinus rhythm with anterospetal infarct    ECG: The ECG was negative for ischemia.    Stress Test: A pharmacological stress test was performed using lexiscan.     CARDIAC CATH 12/8/23    Single vessel corornary artery disease    Successful PCI of mid LAD with LINSEY    Normal LV systolic function     ECHO 12/11/23    Left Ventricle: Normal left ventricular systolic function with a visually estimated EF of 55 - 60%. Left ventricle size is normal. Mild septal thickening. Normal wall motion. Normal diastolic function.    Tricuspid Valve: Trace regurgitation. The estimated RVSP is 37 mmHg.    Image quality is fair. Technically difficult study.     HOLTER MONITOR 12/11/23  Sinus rhythm throughout with average HR 79 bpm  Occasional PVC's ( < 1%); no NSVT noted  Occasional PAC's ( < 1%); 4 runs of non-sustained PAT noted, longest 6 beats  No bradycardia or pauses noted  No symptoms reported        Assessment and plan:  -CAD s/p PCI to LAD.  Continue ASA, plavix, BB, ad statin.  Stable  -Acute CHF likely diastolic. I will obtain TTE. Compression socks knee high and off sleeping. Lasix 20mg po qday. BMP in one week. Continue low dose metoprolol. Refer to CHF clinic  -Hyperlipidemia- Continue

## 2024-12-19 DIAGNOSIS — R06.02 SHORTNESS OF BREATH: ICD-10-CM

## 2024-12-20 RX ORDER — ALBUTEROL SULFATE 90 UG/1
2 INHALANT RESPIRATORY (INHALATION) EVERY 6 HOURS PRN
Qty: 18 G | Refills: 3 | Status: SHIPPED | OUTPATIENT
Start: 2024-12-20

## 2024-12-20 RX ORDER — ALBUTEROL SULFATE 0.83 MG/ML
2.5 SOLUTION RESPIRATORY (INHALATION) EVERY 6 HOURS PRN
Qty: 120 EACH | Refills: 3 | Status: SHIPPED | OUTPATIENT
Start: 2024-12-20

## 2024-12-20 NOTE — TELEPHONE ENCOUNTER
Patient Comment: May I please get this in the  viles to work in my nebulizer?  Works better for me. Thank you.  Sunita Watts is requesting a refill on the following medication(s):  Requested Prescriptions     Pending Prescriptions Disp Refills    albuterol sulfate HFA (PROVENTIL HFA) 108 (90 Base) MCG/ACT inhaler 18 g 3     Sig: Inhale 2 puffs into the lungs every 6 hours as needed for Wheezing       Last Visit Date (If Applicable):  12/4/2024    Next Visit Date:    1/8/2025

## 2024-12-23 LAB
ANION GAP SERPL CALCULATED.3IONS-SCNC: 7.7 MMOL/L (ref 3–11)
BUN BLDV-MCNC: 12 MG/DL (ref 7–17)
CALCIUM SERPL-MCNC: 9.3 MG/DL (ref 8.4–10.2)
CHLORIDE BLD-SCNC: 94 MMOL/L (ref 98–120)
CO2: 39 MMOL/L (ref 22–31)
CREAT SERPL-MCNC: 0.6 MG/DL (ref 0.5–1)
GFR, ESTIMATED: > 60
GLUCOSE: 112 MG/DL (ref 65–105)
POTASSIUM SERPL-SCNC: 4.7 MMOL/L (ref 3.6–5)
SODIUM BLD-SCNC: 136 MMOL/L (ref 135–145)

## 2024-12-26 DIAGNOSIS — R94.31 ABNORMAL HOLTER EXAM: ICD-10-CM

## 2024-12-26 RX ORDER — ALBUTEROL SULFATE 0.83 MG/ML
2.5 SOLUTION RESPIRATORY (INHALATION) EVERY 6 HOURS PRN
Qty: 120 EACH | Refills: 3 | Status: SHIPPED | OUTPATIENT
Start: 2024-12-26 | End: 2024-12-27 | Stop reason: SDUPTHER

## 2024-12-27 ENCOUNTER — HOSPITAL ENCOUNTER (OUTPATIENT)
Age: 69
Discharge: HOME | End: 2024-12-29
Attending: INTERNAL MEDICINE
Payer: MEDICARE

## 2024-12-27 VITALS — WEIGHT: 126 LBS | HEIGHT: 60 IN | BODY MASS INDEX: 24.74 KG/M2

## 2024-12-27 DIAGNOSIS — J06.9 URI WITH COUGH AND CONGESTION: ICD-10-CM

## 2024-12-27 DIAGNOSIS — J44.9 CHRONIC OBSTRUCTIVE PULMONARY DISEASE, UNSPECIFIED COPD TYPE (HCC): ICD-10-CM

## 2024-12-27 DIAGNOSIS — R06.02 SHORTNESS OF BREATH: Primary | ICD-10-CM

## 2024-12-27 DIAGNOSIS — I10 PRIMARY HYPERTENSION: ICD-10-CM

## 2024-12-27 DIAGNOSIS — I50.9 ACUTE CONGESTIVE HEART FAILURE, UNSPECIFIED HEART FAILURE TYPE (HCC): ICD-10-CM

## 2024-12-27 DIAGNOSIS — I25.10 CORONARY ARTERY DISEASE INVOLVING NATIVE CORONARY ARTERY OF NATIVE HEART WITHOUT ANGINA PECTORIS: ICD-10-CM

## 2024-12-27 LAB
ECHO AO ROOT DIAM: 2.5 CM
ECHO AO ROOT INDEX: 1.63 CM/M2
ECHO AV AREA PEAK VELOCITY: 2.3 CM2
ECHO AV AREA/BSA PEAK VELOCITY: 1.5 CM2/M2
ECHO AV PEAK GRADIENT: 7 MMHG
ECHO AV PEAK VELOCITY: 1.3 M/S
ECHO AV VELOCITY RATIO: 0.77
ECHO BSA: 1.56 M2
ECHO EST RA PRESSURE: 3 MMHG
ECHO IVC PROX: 1.9 CM
ECHO LA AREA 2C: 12.8 CM2
ECHO LA AREA 4C: 8.9 CM2
ECHO LA DIAMETER INDEX: 1.57 CM/M2
ECHO LA DIAMETER: 2.4 CM
ECHO LA MAJOR AXIS: 3.9 CM
ECHO LA MINOR AXIS: 4.3 CM
ECHO LA TO AORTIC ROOT RATIO: 0.96
ECHO LA VOL BP: 23 ML (ref 22–52)
ECHO LA VOL MOD A2C: 31 ML (ref 22–52)
ECHO LA VOL MOD A4C: 16 ML (ref 22–52)
ECHO LA VOL/BSA BIPLANE: 15 ML/M2 (ref 16–34)
ECHO LA VOLUME INDEX MOD A2C: 20 ML/M2 (ref 16–34)
ECHO LA VOLUME INDEX MOD A4C: 10 ML/M2 (ref 16–34)
ECHO LV E' LATERAL VELOCITY: 5.55 CM/S
ECHO LV E' SEPTAL VELOCITY: 3.26 CM/S
ECHO LV EF PHYSICIAN: 55 %
ECHO LV FRACTIONAL SHORTENING: 46 % (ref 28–44)
ECHO LV INTERNAL DIMENSION DIASTOLE INDEX: 2.29 CM/M2
ECHO LV INTERNAL DIMENSION DIASTOLIC: 3.5 CM (ref 3.9–5.3)
ECHO LV INTERNAL DIMENSION SYSTOLIC INDEX: 1.24 CM/M2
ECHO LV INTERNAL DIMENSION SYSTOLIC: 1.9 CM
ECHO LV IVSD: 0.8 CM (ref 0.6–0.9)
ECHO LV MASS 2D: 81.9 G (ref 67–162)
ECHO LV MASS INDEX 2D: 53.5 G/M2 (ref 43–95)
ECHO LV POSTERIOR WALL DIASTOLIC: 0.9 CM (ref 0.6–0.9)
ECHO LV RELATIVE WALL THICKNESS RATIO: 0.51
ECHO LVOT AREA: 3.1 CM2
ECHO LVOT DIAM: 2 CM
ECHO LVOT MEAN GRADIENT: 2 MMHG
ECHO LVOT PEAK GRADIENT: 4 MMHG
ECHO LVOT PEAK VELOCITY: 1 M/S
ECHO LVOT STROKE VOLUME INDEX: 36.3 ML/M2
ECHO LVOT SV: 55.6 ML
ECHO LVOT VTI: 17.7 CM
ECHO MV A VELOCITY: 0.85 M/S
ECHO MV E DECELERATION TIME (DT): 190 MS
ECHO MV E VELOCITY: 0.51 M/S
ECHO MV E/A RATIO: 0.6
ECHO MV E/E' LATERAL: 9.19
ECHO MV E/E' RATIO (AVERAGED): 12.42
ECHO MV E/E' SEPTAL: 15.64
ECHO RA AREA 4C: 13.1 CM2
ECHO RA END SYSTOLIC VOLUME APICAL 4 CHAMBER INDEX BSA: 22 ML/M2
ECHO RA VOLUME: 33 ML
ECHO RIGHT VENTRICULAR SYSTOLIC PRESSURE (RVSP): 55 MMHG
ECHO RV INTERNAL DIMENSION: 3.4 CM
ECHO RV TAPSE: 1.3 CM (ref 1.7–?)
ECHO TV REGURGITANT MAX VELOCITY: 3.59 M/S
ECHO TV REGURGITANT PEAK GRADIENT: 51 MMHG
LEFT VENTRICULAR EJECTION FRACTION MODE: NORMAL
LV EF: 55 %

## 2024-12-27 PROCEDURE — 93306 TTE W/DOPPLER COMPLETE: CPT | Performed by: INTERNAL MEDICINE

## 2024-12-27 PROCEDURE — 93306 TTE W/DOPPLER COMPLETE: CPT

## 2024-12-27 RX ORDER — METOPROLOL TARTRATE 25 MG/1
TABLET, FILM COATED ORAL
Qty: 90 TABLET | Refills: 0 | Status: SHIPPED | OUTPATIENT
Start: 2024-12-27

## 2024-12-27 RX ORDER — ALBUTEROL SULFATE 0.83 MG/ML
2.5 SOLUTION RESPIRATORY (INHALATION) EVERY 6 HOURS PRN
Qty: 120 EACH | Refills: 5 | Status: SHIPPED | OUTPATIENT
Start: 2024-12-27

## 2024-12-27 NOTE — TELEPHONE ENCOUNTER
Fax received from [facility] stating that the patient's Albuterol is covered under the patient's Medicare Part A/B, but they NEED the diagnosis code on the script that gets sent to them electronically.    Script pended for PCP to review and remove any DX codes that are not needed.

## 2024-12-31 ENCOUNTER — TELEPHONE (OUTPATIENT)
Dept: CARDIOLOGY | Age: 69
End: 2024-12-31

## 2024-12-31 NOTE — TELEPHONE ENCOUNTER
Interpretation Summary  Show Result Comparison     Left Ventricle: Normal left ventricular systolic function. EF by visual approximation is 55%. Left ventricle size is normal. Normal wall thickness. Normal wall motion.    Right Ventricle: Moderately reduced systolic function. TAPSE is abnormal.RV is severly dilated with volume and pressure overload,this is new from last year TTE,clinically correllation, do CT imaging if there is concern of Pulmonary embolisim.    Mitral Valve: Mildly thickened leaflets.    Tricuspid Valve: Moderate regurgitation. Severely elevated RVSP, consistent with severe pulmonary hypertension.    Image quality is technically difficult.     Echo Findings    Left Ventricle Normal left ventricular systolic function. EF by visual approximation is 55%. Left ventricle size is normal. Normal wall thickness. Septal flattening in systole consistent with right ventricular pressure overload. Normal wall motion. Indeterminate diastolic function due to arrhythmia.   Left Atrium Left atrium size is normal. LA Vol Index is  15 ml/m2.   Right Ventricle Right ventricle size is normal. Moderately reduced systolic function. TAPSE is abnormal.   Right Atrium Right atrium size is normal.   Aortic Valve Valve structure is normal. No regurgitation. No stenosis.   Mitral Valve Mildly thickened leaflets. Trace regurgitation. No stenosis noted.   Tricuspid Valve Valve structure is normal. Moderate regurgitation. No stenosis noted. Severely elevated RVSP, consistent with severe pulmonary hypertension.   Pulmonic Valve The pulmonic valve visualization is suboptimal but appears to be functioning normally. Physiologically normal regurgitation. No stenosis noted.   Aorta Normal sized aortic root and ascending aorta.   IVC/Hepatic Veins IVC diameter is less than or equal to 21 mm and decreases greater than 50% during inspiration; therefore the estimated right atrial pressure is normal (~3 mmHg). IVC size is normal.

## 2025-01-02 ENCOUNTER — APPOINTMENT (OUTPATIENT)
Dept: CT IMAGING | Age: 70
End: 2025-01-02
Payer: MEDICARE

## 2025-01-02 ENCOUNTER — HOSPITAL ENCOUNTER (EMERGENCY)
Age: 70
Discharge: HOME | End: 2025-01-02
Attending: EMERGENCY MEDICINE
Payer: MEDICARE

## 2025-01-02 VITALS
BODY MASS INDEX: 24.61 KG/M2 | WEIGHT: 126 LBS | TEMPERATURE: 97 F | OXYGEN SATURATION: 92 % | RESPIRATION RATE: 24 BRPM | HEART RATE: 77 BPM | DIASTOLIC BLOOD PRESSURE: 68 MMHG | SYSTOLIC BLOOD PRESSURE: 135 MMHG

## 2025-01-02 DIAGNOSIS — I27.81 COR PULMONALE (CHRONIC) (HCC): ICD-10-CM

## 2025-01-02 DIAGNOSIS — J44.9 CHRONIC OBSTRUCTIVE PULMONARY DISEASE, UNSPECIFIED COPD TYPE (HCC): Primary | ICD-10-CM

## 2025-01-02 LAB
ALBUMIN SERPL-MCNC: 4.2 G/DL (ref 3.5–5.2)
ALBUMIN/GLOB SERPL: 1.4 {RATIO} (ref 1–2.5)
ALP SERPL-CCNC: 147 U/L (ref 35–104)
ALT SERPL-CCNC: 13 U/L (ref 5–33)
ANION GAP SERPL CALCULATED.3IONS-SCNC: 7 MMOL/L (ref 9–17)
AST SERPL-CCNC: 23 U/L
BASOPHILS # BLD: 0.1 K/UL (ref 0–0.2)
BASOPHILS NFR BLD: 2 % (ref 0–2)
BILIRUB SERPL-MCNC: 0.6 MG/DL (ref 0.3–1.2)
BUN SERPL-MCNC: 10 MG/DL (ref 8–23)
BUN/CREAT SERPL: 17 (ref 9–20)
CALCIUM SERPL-MCNC: 9.1 MG/DL (ref 8.6–10.4)
CHLORIDE SERPL-SCNC: 97 MMOL/L (ref 98–107)
CO2 SERPL-SCNC: 37 MMOL/L (ref 20–31)
CREAT SERPL-MCNC: 0.6 MG/DL (ref 0.5–0.9)
EKG ATRIAL RATE: 76 BPM
EKG P AXIS: 82 DEGREES
EKG P-R INTERVAL: 126 MS
EKG Q-T INTERVAL: 408 MS
EKG QRS DURATION: 74 MS
EKG QTC CALCULATION (BAZETT): 459 MS
EKG R AXIS: -35 DEGREES
EKG T AXIS: -19 DEGREES
EKG VENTRICULAR RATE: 76 BPM
EOSINOPHIL # BLD: 0.05 K/UL (ref 0–0.44)
EOSINOPHILS RELATIVE PERCENT: 1 % (ref 1–4)
ERYTHROCYTE [DISTWIDTH] IN BLOOD BY AUTOMATED COUNT: 15.1 % (ref 11.8–14.4)
GFR, ESTIMATED: >90 ML/MIN/1.73M2
GLUCOSE SERPL-MCNC: 107 MG/DL (ref 70–99)
HCT VFR BLD AUTO: 47.5 % (ref 36.3–47.1)
HGB BLD-MCNC: 13.8 G/DL (ref 11.9–15.1)
IMM GRANULOCYTES # BLD AUTO: <0.03 K/UL (ref 0–0.3)
IMM GRANULOCYTES NFR BLD: 0 %
LYMPHOCYTES NFR BLD: 0.79 K/UL (ref 1.1–3.7)
LYMPHOCYTES RELATIVE PERCENT: 13 % (ref 24–43)
MCH RBC QN AUTO: 25.3 PG (ref 25.2–33.5)
MCHC RBC AUTO-ENTMCNC: 29.1 G/DL (ref 25.2–33.5)
MCV RBC AUTO: 87 FL (ref 82.6–102.9)
MONOCYTES NFR BLD: 0.51 K/UL (ref 0.1–1.2)
MONOCYTES NFR BLD: 8 % (ref 3–12)
NEUTROPHILS NFR BLD: 76 % (ref 36–65)
NEUTS SEG NFR BLD: 4.63 K/UL (ref 1.5–8.1)
NRBC BLD-RTO: 0 PER 100 WBC
PLATELET # BLD AUTO: 331 K/UL (ref 138–453)
PMV BLD AUTO: 9.5 FL (ref 8.1–13.5)
POTASSIUM SERPL-SCNC: 4.1 MMOL/L (ref 3.7–5.3)
PROT SERPL-MCNC: 7.1 G/DL (ref 6.4–8.3)
RBC # BLD AUTO: 5.46 M/UL (ref 3.95–5.11)
RBC # BLD: ABNORMAL 10*6/UL
SODIUM SERPL-SCNC: 141 MMOL/L (ref 135–144)
TROPONIN I SERPL HS-MCNC: 22 NG/L (ref 0–14)
WBC OTHER # BLD: 6.1 K/UL (ref 3.5–11.3)

## 2025-01-02 PROCEDURE — 99285 EMERGENCY DEPT VISIT HI MDM: CPT

## 2025-01-02 PROCEDURE — 36415 COLL VENOUS BLD VENIPUNCTURE: CPT

## 2025-01-02 PROCEDURE — 84484 ASSAY OF TROPONIN QUANT: CPT

## 2025-01-02 PROCEDURE — 80053 COMPREHEN METABOLIC PANEL: CPT

## 2025-01-02 PROCEDURE — 85025 COMPLETE CBC W/AUTO DIFF WBC: CPT

## 2025-01-02 PROCEDURE — 71260 CT THORAX DX C+: CPT

## 2025-01-02 PROCEDURE — 6360000004 HC RX CONTRAST MEDICATION: Performed by: EMERGENCY MEDICINE

## 2025-01-02 PROCEDURE — 2709999900 CT CHEST PULMONARY EMBOLISM W CONTRAST

## 2025-01-02 PROCEDURE — 93005 ELECTROCARDIOGRAM TRACING: CPT | Performed by: EMERGENCY MEDICINE

## 2025-01-02 RX ORDER — IOPAMIDOL 755 MG/ML
80 INJECTION, SOLUTION INTRAVASCULAR
Status: COMPLETED | OUTPATIENT
Start: 2025-01-02 | End: 2025-01-02

## 2025-01-02 RX ADMIN — IOPAMIDOL 80 ML: 755 INJECTION, SOLUTION INTRAVENOUS at 16:09

## 2025-01-02 ASSESSMENT — PAIN - FUNCTIONAL ASSESSMENT: PAIN_FUNCTIONAL_ASSESSMENT: NONE - DENIES PAIN

## 2025-01-02 ASSESSMENT — ENCOUNTER SYMPTOMS
DIARRHEA: 0
NAUSEA: 0
VOMITING: 0
BLOOD IN STOOL: 0
SHORTNESS OF BREATH: 1
COUGH: 0
CONSTIPATION: 0
ABDOMINAL PAIN: 0
BACK PAIN: 0
EYE PAIN: 0

## 2025-01-02 ASSESSMENT — LIFESTYLE VARIABLES: HOW OFTEN DO YOU HAVE A DRINK CONTAINING ALCOHOL: NEVER

## 2025-01-02 NOTE — ED PROVIDER NOTES
type (HCC)    2. Cor pulmonale (chronic) (HCC)          DISPOSITION/PLAN   DISPOSITION Decision To Discharge    Condition on Disposition    Stable  PATIENT REFERRED TO:  Ayad Spears MD  [address] [address]  [phone]    In 3 days        DISCHARGE MEDICATIONS:  New Prescriptions    No medications on file       (Please note that portions of this note were completed with a voice recognition program.  Efforts were made to edit the dictations but occasionally words are mis-transcribed.)    Sammy Conrad MD,, MD, F.A.A.E.M.  Attending Emergency Physician                           Sammy Conrad MD  01/02/25 2541

## 2025-01-03 ENCOUNTER — HOSPITAL ENCOUNTER (OUTPATIENT)
Dept: OTHER | Age: 70
Discharge: HOME | End: 2025-01-03
Payer: MEDICARE

## 2025-01-03 VITALS
WEIGHT: 122.4 LBS | RESPIRATION RATE: 16 BRPM | BODY MASS INDEX: 24.03 KG/M2 | OXYGEN SATURATION: 98 % | HEART RATE: 75 BPM | HEIGHT: 60 IN | SYSTOLIC BLOOD PRESSURE: 118 MMHG | DIASTOLIC BLOOD PRESSURE: 68 MMHG

## 2025-01-03 PROCEDURE — 99202 OFFICE O/P NEW SF 15 MIN: CPT

## 2025-01-03 NOTE — PROGRESS NOTES
Patient and  here for initial CHF Education visit; they watched CHF video.  Introduced CHF Playbook and CHF Zone tool worksheet to them and they stated understanding of material reviewed.  Patient reports she does have a scale at home but hasn't been weighing self daily; patient is willing to start with daily weights.  She states she just got compression stockings recommended by Dr. Spears but hasn't worn them yet.  Patient admits that swelling of lower extremities is much improved since last visit with Dr. Spears.  Patient was in ED yesterday (see encounter) and is scheduled to see cardiology on Monday, 1-6-25,  for follow up.      Verbally reviewed importance of medication compliance with patient; patient verbalized understanding.    Discussed 2000mg/day sodium restricted diet; patient verbalized understanding.  Patient states she doesn't cook with salt nor add to foods at the table.      Moderate daily exercise encouraged as tolerated. Discussed rest breaks as needed; patient verbalized understanding.  Patient reports her activity is limited due to shortness of breath.  She does do her grocery shopping and household work, states she takes breaks as needed.     Patient instructed to weigh self at the same time of each day, using same clothes and same scale; reinforced teaching to monitor for 2-3 lb weight increase over 1-2 days, and to notify the CHF clinic at [phone] or [phone] or physician office if weight change noted.  Patient verbalized understanding.    Risks of smoking discussed with the patient if applicable; patient strongly discouraged to smoke.  Patient verbalized understanding.    Signs and symptoms of CHF discussed with patient, such as feeling more tired than normal, feeling short of breath, coughing that increases when you lie down, sudden weight gain, swelling of your feet, legs or belly.  Patient verbalized understanding to notify the CHF clinic at [phone] or [phone]

## 2025-01-06 ENCOUNTER — OFFICE VISIT (OUTPATIENT)
Dept: CARDIOLOGY | Age: 70
End: 2025-01-06
Payer: MEDICARE

## 2025-01-06 VITALS
HEART RATE: 80 BPM | SYSTOLIC BLOOD PRESSURE: 148 MMHG | DIASTOLIC BLOOD PRESSURE: 76 MMHG | BODY MASS INDEX: 23.95 KG/M2 | WEIGHT: 122 LBS | HEIGHT: 60 IN

## 2025-01-06 DIAGNOSIS — R06.02 SHORTNESS OF BREATH: Primary | ICD-10-CM

## 2025-01-06 PROCEDURE — 99214 OFFICE O/P EST MOD 30 MIN: CPT | Performed by: SURGERY

## 2025-01-06 PROCEDURE — 99213 OFFICE O/P EST LOW 20 MIN: CPT | Performed by: SURGERY

## 2025-01-06 PROCEDURE — 1126F AMNT PAIN NOTED NONE PRSNT: CPT | Performed by: SURGERY

## 2025-01-06 PROCEDURE — 1159F MED LIST DOCD IN RCRD: CPT | Performed by: SURGERY

## 2025-01-06 PROCEDURE — G8420 CALC BMI NORM PARAMETERS: HCPCS | Performed by: SURGERY

## 2025-01-06 PROCEDURE — G8400 PT W/DXA NO RESULTS DOC: HCPCS | Performed by: SURGERY

## 2025-01-06 PROCEDURE — 1090F PRES/ABSN URINE INCON ASSESS: CPT | Performed by: SURGERY

## 2025-01-06 PROCEDURE — 4004F PT TOBACCO SCREEN RCVD TLK: CPT | Performed by: SURGERY

## 2025-01-06 PROCEDURE — 3017F COLORECTAL CA SCREEN DOC REV: CPT | Performed by: SURGERY

## 2025-01-06 PROCEDURE — G8427 DOCREV CUR MEDS BY ELIG CLIN: HCPCS | Performed by: SURGERY

## 2025-01-06 PROCEDURE — 1123F ACP DISCUSS/DSCN MKR DOCD: CPT | Performed by: SURGERY

## 2025-01-06 NOTE — PROGRESS NOTES
Today's Date: 1/6/2025  Patient's Name: Sunita Watts  Patient's age: 69 y.o., 1955    Subjective:  Sunita Watts  ER follow up after abnormal echo   Patient seen and examined in office today,  Patient was asked to go to the ER for abnormal echo to rule out PE, cardiology wasn't consulted . Denies anginal type chest pain. dyspnea on exertion. No lightheadedness, dizziness,  nausea/vomiting, diaphoresis, or palpitations. Reports complete medication compliance. Denies any noticeable peripheral edema or unexplained sudden weight gain/loss. Medical history reviewed.      Past Medical History:   has a past medical history of COPD (chronic obstructive pulmonary disease) (HCC) and Depression.    Past Surgical History:   has a past surgical history that includes eye surgery (Cataract); Cardiac procedure (N/A, 12/8/2023); and Cardiac procedure (N/A, 12/8/2023).    Home Medications:  Prior to Admission medications    Medication Sig Start Date End Date Taking? Authorizing Provider   metoprolol tartrate (LOPRESSOR) 25 MG tablet Take 1/2 (one-half) tablet by mouth twice daily 12/27/24  Yes Yoly Coleman APRN - CNP   furosemide (LASIX) 20 MG tablet Take 1 tablet by mouth daily 12/18/24  Yes Ayad Spears MD   rosuvastatin (CRESTOR) 20 MG tablet Take 1 tablet by mouth daily 3/1/24  Yes Libby Marie APRN - CNP   clopidogrel (PLAVIX) 75 MG tablet Take 1 tablet by mouth daily 1/2/24  Yes Libby Marie APRN - CNP   aspirin 81 MG EC tablet Take 1 tablet by mouth daily 11/22/23  Yes Ayad Spears MD   albuterol (PROVENTIL) (2.5 MG/3ML) 0.083% nebulizer solution Take 3 mLs by nebulization every 6 hours as needed for Wheezing  Patient not taking: Reported on 1/6/2025 12/27/24   Polina Foley APRN - CNP   escitalopram (LEXAPRO) 5 MG tablet Take 1 tablet by mouth daily  Patient not taking: Reported on 12/18/2024 11/21/24   Hochmuth, Kita B, APRN - CNP       Allergies:  Patient has no

## 2025-01-07 ASSESSMENT — PATIENT HEALTH QUESTIONNAIRE - PHQ9
8. MOVING OR SPEAKING SO SLOWLY THAT OTHER PEOPLE COULD HAVE NOTICED. OR THE OPPOSITE, BEING SO FIGETY OR RESTLESS THAT YOU HAVE BEEN MOVING AROUND A LOT MORE THAN USUAL: NOT AT ALL
5. POOR APPETITE OR OVEREATING: NOT AT ALL
SUM OF ALL RESPONSES TO PHQ QUESTIONS 1-9: 2
SUM OF ALL RESPONSES TO PHQ QUESTIONS 1-9: 2
7. TROUBLE CONCENTRATING ON THINGS, SUCH AS READING THE NEWSPAPER OR WATCHING TELEVISION: NOT AT ALL
3. TROUBLE FALLING OR STAYING ASLEEP: NOT AT ALL
2. FEELING DOWN, DEPRESSED OR HOPELESS: NOT AT ALL
9. THOUGHTS THAT YOU WOULD BE BETTER OFF DEAD, OR OF HURTING YOURSELF: NOT AT ALL
SUM OF ALL RESPONSES TO PHQ9 QUESTIONS 1 & 2: 1
4. FEELING TIRED OR HAVING LITTLE ENERGY: SEVERAL DAYS
4. FEELING TIRED OR HAVING LITTLE ENERGY: SEVERAL DAYS
3. TROUBLE FALLING OR STAYING ASLEEP: NOT AT ALL
1. LITTLE INTEREST OR PLEASURE IN DOING THINGS: SEVERAL DAYS
1. LITTLE INTEREST OR PLEASURE IN DOING THINGS: SEVERAL DAYS
7. TROUBLE CONCENTRATING ON THINGS, SUCH AS READING THE NEWSPAPER OR WATCHING TELEVISION: NOT AT ALL
5. POOR APPETITE OR OVEREATING: NOT AT ALL
6. FEELING BAD ABOUT YOURSELF - OR THAT YOU ARE A FAILURE OR HAVE LET YOURSELF OR YOUR FAMILY DOWN: NOT AT ALL
SUM OF ALL RESPONSES TO PHQ QUESTIONS 1-9: 2
SUM OF ALL RESPONSES TO PHQ QUESTIONS 1-9: 2
8. MOVING OR SPEAKING SO SLOWLY THAT OTHER PEOPLE COULD HAVE NOTICED. OR THE OPPOSITE - BEING SO FIDGETY OR RESTLESS THAT YOU HAVE BEEN MOVING AROUND A LOT MORE THAN USUAL: NOT AT ALL
6. FEELING BAD ABOUT YOURSELF - OR THAT YOU ARE A FAILURE OR HAVE LET YOURSELF OR YOUR FAMILY DOWN: NOT AT ALL
10. IF YOU CHECKED OFF ANY PROBLEMS, HOW DIFFICULT HAVE THESE PROBLEMS MADE IT FOR YOU TO DO YOUR WORK, TAKE CARE OF THINGS AT HOME, OR GET ALONG WITH OTHER PEOPLE: SOMEWHAT DIFFICULT
10. IF YOU CHECKED OFF ANY PROBLEMS, HOW DIFFICULT HAVE THESE PROBLEMS MADE IT FOR YOU TO DO YOUR WORK, TAKE CARE OF THINGS AT HOME, OR GET ALONG WITH OTHER PEOPLE: SOMEWHAT DIFFICULT
9. THOUGHTS THAT YOU WOULD BE BETTER OFF DEAD, OR OF HURTING YOURSELF: NOT AT ALL
2. FEELING DOWN, DEPRESSED OR HOPELESS: NOT AT ALL
SUM OF ALL RESPONSES TO PHQ QUESTIONS 1-9: 2

## 2025-01-08 ENCOUNTER — OFFICE VISIT (OUTPATIENT)
Dept: PRIMARY CARE CLINIC | Age: 70
End: 2025-01-08

## 2025-01-08 VITALS
BODY MASS INDEX: 23.83 KG/M2 | SYSTOLIC BLOOD PRESSURE: 122 MMHG | HEART RATE: 82 BPM | OXYGEN SATURATION: 95 % | WEIGHT: 122 LBS | DIASTOLIC BLOOD PRESSURE: 82 MMHG | RESPIRATION RATE: 16 BRPM

## 2025-01-08 DIAGNOSIS — Z12.11 COLON CANCER SCREENING: Primary | ICD-10-CM

## 2025-01-08 DIAGNOSIS — R06.02 SHORTNESS OF BREATH: ICD-10-CM

## 2025-01-08 DIAGNOSIS — F17.210 CIGARETTE SMOKER: ICD-10-CM

## 2025-01-08 DIAGNOSIS — F33.1 MODERATE EPISODE OF RECURRENT MAJOR DEPRESSIVE DISORDER (HCC): ICD-10-CM

## 2025-01-08 DIAGNOSIS — B35.1 TOENAIL FUNGUS: ICD-10-CM

## 2025-01-08 RX ORDER — CICLOPIROX 80 MG/ML
SOLUTION TOPICAL
Qty: 6 ML | Refills: 1 | Status: SHIPPED | OUTPATIENT
Start: 2025-01-08

## 2025-01-08 SDOH — ECONOMIC STABILITY: FOOD INSECURITY: WITHIN THE PAST 12 MONTHS, THE FOOD YOU BOUGHT JUST DIDN'T LAST AND YOU DIDN'T HAVE MONEY TO GET MORE.: NEVER TRUE

## 2025-01-08 SDOH — ECONOMIC STABILITY: FOOD INSECURITY: WITHIN THE PAST 12 MONTHS, YOU WORRIED THAT YOUR FOOD WOULD RUN OUT BEFORE YOU GOT MONEY TO BUY MORE.: NEVER TRUE

## 2025-01-08 NOTE — PROGRESS NOTES
General: She is not in acute distress.     Appearance: Normal appearance.   HENT:      Head: Normocephalic.   Eyes:      Pupils: Pupils are equal, round, and reactive to light.   Cardiovascular:      Rate and Rhythm: Normal rate and regular rhythm.      Pulses: Normal pulses.      Heart sounds: Normal heart sounds.   Pulmonary:      Effort: Pulmonary effort is normal.      Breath sounds: Normal breath sounds.   Abdominal:      General: There is no distension.   Musculoskeletal:         General: Normal range of motion.      Right lower leg: No edema.      Left lower leg: No edema.   Skin:     General: Skin is warm and dry.      Capillary Refill: Capillary refill takes less than 2 seconds.   Neurological:      General: No focal deficit present.      Mental Status: She is alert and oriented to person, place, and time.   Psychiatric:         Mood and Affect: Mood normal.         Behavior: Behavior normal.           :       Diagnosis Orders   1. Colon cancer screening  Fecal DNA Colorectal cancer screening (Cologuard)      2. Shortness of breath  Pawel Grimm MD, Pulmonology, [place]      3. Cigarette smoker  Pawel Grimm MD, Pulmonology, [place]      4. Toenail fungus  ciclopirox (PENLAC) 8 % solution      5. Moderate episode of recurrent major depressive disorder (HCC)                  :   Assessment & Plan     Assessment & Plan  1. Anxiety and depression.  Her mood appears stable without the initiation of Lexapro. She is advised to consider starting Lexapro to help manage excessive worry and racing thoughts. If she decides to start it, she should notify the provider and continue it for 4 to 6 weeks to notice a difference.    2. Chronic obstructive pulmonary disease (COPD).  Her CT chest did not reveal any emphysematous changes, but given her 30-year smoking history and symptoms of shortness of breath and wheezing, COPD remains a concern. She is encouraged to continue reducing her smoking habit. A

## 2025-01-09 DIAGNOSIS — J44.9 CHRONIC OBSTRUCTIVE PULMONARY DISEASE, UNSPECIFIED COPD TYPE (HCC): Primary | ICD-10-CM

## 2025-01-10 ASSESSMENT — ENCOUNTER SYMPTOMS
ABDOMINAL PAIN: 0
SHORTNESS OF BREATH: 0
DIARRHEA: 0
CHEST TIGHTNESS: 0
NAUSEA: 0
COLOR CHANGE: 0
VOMITING: 0
SORE THROAT: 0
RHINORRHEA: 0

## 2025-01-14 ENCOUNTER — HOSPITAL ENCOUNTER (OUTPATIENT)
Age: 70
Discharge: HOME | End: 2025-01-16
Payer: MEDICARE

## 2025-01-14 ENCOUNTER — HOSPITAL ENCOUNTER (OUTPATIENT)
Dept: NUCLEAR MEDICINE | Age: 70
Discharge: HOME | End: 2025-01-16
Payer: MEDICARE

## 2025-01-14 DIAGNOSIS — R06.02 SHORTNESS OF BREATH: ICD-10-CM

## 2025-01-14 LAB
NUC STRESS EJECTION FRACTION: 79 %
STRESS BASELINE DIAS BP: 91 MMHG
STRESS BASELINE HR: 80 BPM
STRESS BASELINE SYS BP: 162 MMHG
STRESS ESTIMATED WORKLOAD: 1 METS
STRESS PEAK DIAS BP: 91 MMHG
STRESS PEAK SYS BP: 162 MMHG
STRESS PERCENT HR ACHIEVED: 64 %
STRESS POST PEAK HR: 95 BPM
STRESS RATE PRESSURE PRODUCT: NORMAL BPM*MMHG
STRESS TARGET HR: 149 BPM
TID: 1.07

## 2025-01-14 PROCEDURE — 6360000002 HC RX W HCPCS: Performed by: INTERNAL MEDICINE

## 2025-01-14 PROCEDURE — 93016 CV STRESS TEST SUPVJ ONLY: CPT | Performed by: INTERNAL MEDICINE

## 2025-01-14 PROCEDURE — 93018 CV STRESS TEST I&R ONLY: CPT | Performed by: INTERNAL MEDICINE

## 2025-01-14 PROCEDURE — 78452 HT MUSCLE IMAGE SPECT MULT: CPT

## 2025-01-14 PROCEDURE — 93017 CV STRESS TEST TRACING ONLY: CPT

## 2025-01-14 PROCEDURE — A9500 TC99M SESTAMIBI: HCPCS | Performed by: SURGERY

## 2025-01-14 PROCEDURE — 3430000000 HC RX DIAGNOSTIC RADIOPHARMACEUTICAL: Performed by: SURGERY

## 2025-01-14 PROCEDURE — 78452 HT MUSCLE IMAGE SPECT MULT: CPT | Performed by: INTERNAL MEDICINE

## 2025-01-14 RX ORDER — AMINOPHYLLINE 25 MG/ML
100 INJECTION, SOLUTION INTRAVENOUS ONCE
Status: COMPLETED | OUTPATIENT
Start: 2025-01-14 | End: 2025-01-14

## 2025-01-14 RX ORDER — REGADENOSON 0.08 MG/ML
0.4 INJECTION, SOLUTION INTRAVENOUS ONCE
Status: COMPLETED | OUTPATIENT
Start: 2025-01-14 | End: 2025-01-14

## 2025-01-14 RX ORDER — TETRAKIS(2-METHOXYISOBUTYLISOCYANIDE)COPPER(I) TETRAFLUOROBORATE 1 MG/ML
10 INJECTION, POWDER, LYOPHILIZED, FOR SOLUTION INTRAVENOUS
Status: COMPLETED | OUTPATIENT
Start: 2025-01-14 | End: 2025-01-14

## 2025-01-14 RX ORDER — TETRAKIS(2-METHOXYISOBUTYLISOCYANIDE)COPPER(I) TETRAFLUOROBORATE 1 MG/ML
30 INJECTION, POWDER, LYOPHILIZED, FOR SOLUTION INTRAVENOUS
Status: COMPLETED | OUTPATIENT
Start: 2025-01-14 | End: 2025-01-14

## 2025-01-14 RX ADMIN — AMINOPHYLLINE 100 MG: 25 INJECTION, SOLUTION INTRAVENOUS at 14:07

## 2025-01-14 RX ADMIN — Medication 30 MILLICURIE: at 14:00

## 2025-01-14 RX ADMIN — REGADENOSON 0.4 MG: 0.08 INJECTION, SOLUTION INTRAVENOUS at 13:58

## 2025-01-14 RX ADMIN — Medication 10 MILLICURIE: at 13:00

## 2025-01-15 ENCOUNTER — OFFICE VISIT (OUTPATIENT)
Dept: CARDIOLOGY | Age: 70
End: 2025-01-15
Payer: MEDICARE

## 2025-01-15 VITALS
SYSTOLIC BLOOD PRESSURE: 138 MMHG | DIASTOLIC BLOOD PRESSURE: 86 MMHG | BODY MASS INDEX: 23.56 KG/M2 | HEIGHT: 60 IN | WEIGHT: 120 LBS | HEART RATE: 88 BPM

## 2025-01-15 DIAGNOSIS — E78.5 HYPERLIPIDEMIA, UNSPECIFIED HYPERLIPIDEMIA TYPE: ICD-10-CM

## 2025-01-15 DIAGNOSIS — I25.10 CORONARY ARTERY DISEASE INVOLVING NATIVE CORONARY ARTERY OF NATIVE HEART WITHOUT ANGINA PECTORIS: Primary | ICD-10-CM

## 2025-01-15 DIAGNOSIS — I10 PRIMARY HYPERTENSION: ICD-10-CM

## 2025-01-15 PROCEDURE — 1126F AMNT PAIN NOTED NONE PRSNT: CPT | Performed by: NURSE PRACTITIONER

## 2025-01-15 PROCEDURE — 3079F DIAST BP 80-89 MM HG: CPT | Performed by: NURSE PRACTITIONER

## 2025-01-15 PROCEDURE — 3075F SYST BP GE 130 - 139MM HG: CPT | Performed by: NURSE PRACTITIONER

## 2025-01-15 PROCEDURE — 1159F MED LIST DOCD IN RCRD: CPT | Performed by: NURSE PRACTITIONER

## 2025-01-15 PROCEDURE — G8420 CALC BMI NORM PARAMETERS: HCPCS | Performed by: NURSE PRACTITIONER

## 2025-01-15 PROCEDURE — 1123F ACP DISCUSS/DSCN MKR DOCD: CPT | Performed by: NURSE PRACTITIONER

## 2025-01-15 PROCEDURE — 99213 OFFICE O/P EST LOW 20 MIN: CPT | Performed by: NURSE PRACTITIONER

## 2025-01-15 PROCEDURE — 4004F PT TOBACCO SCREEN RCVD TLK: CPT | Performed by: NURSE PRACTITIONER

## 2025-01-15 PROCEDURE — 99214 OFFICE O/P EST MOD 30 MIN: CPT | Performed by: NURSE PRACTITIONER

## 2025-01-15 PROCEDURE — G8427 DOCREV CUR MEDS BY ELIG CLIN: HCPCS | Performed by: NURSE PRACTITIONER

## 2025-01-15 PROCEDURE — G8400 PT W/DXA NO RESULTS DOC: HCPCS | Performed by: NURSE PRACTITIONER

## 2025-01-15 PROCEDURE — 1090F PRES/ABSN URINE INCON ASSESS: CPT | Performed by: NURSE PRACTITIONER

## 2025-01-15 PROCEDURE — 3017F COLORECTAL CA SCREEN DOC REV: CPT | Performed by: NURSE PRACTITIONER

## 2025-01-15 NOTE — PROGRESS NOTES
Perfusion Defect: There is a mild severity left ventricular stress perfusion defect that is small in size present in the distal septal segment(s).    Perfusion Conclusion: TID ratio is 1.07.    ECG: The stress ECG was not diagnostic due to resting ST-T abnormalities.    Stress Test: A pharmacological stress test was performed using regadenoson (Lexiscan).    Resting ECG: sinus ST/T changes at rest, concern for ischemia.    Stress ECG: Arrhythmias during stress: occasional PVCs. The stress ECG was not diagnostic due to resting ST-T abnormalities.    Nuclear stress test 11/16/23    Stress Combined Conclusion: The study is most consistent with myocardial ischemia. Findings suggest a moderate risk of cardiac events.    Perfusion Defect: There is a moderate severity left ventricular stress reversible perfusion defect that is small to medium in size present in the anteroapical and apex segment(s).    Perfusion Conclusion: TID ratio is 1.05.    ECG: Resting ECG demonstrates normal sinus rhythm with anterospetal infarct    ECG: The ECG was negative for ischemia.    Stress Test: A pharmacological stress test was performed using lexiscan.     CARDIAC CATH 12/8/23    Single vessel corornary artery disease    Successful PCI of mid LAD with LINSEY    Normal LV systolic function    Past Medical and Surgical History, Problem List, Allergies, Medications, Labs, Imaging, all reviewed extensively in EMR and with the patient.    Assessment:  Patient Active Problem List    Diagnosis Date Noted    Moderate episode of recurrent major depressive disorder (HCC) 01/08/2025    Elevated blood pressure reading 02/11/2021         Plan:    Hypertension-  Stable. Continue BB.  Discussed importance of ambulatory blood pressure monitoring and medication compliance. Patient verbalized understanding to write down BP's and bring to next appointment.     HFpEF-No S&S of fluid overload noted during exam. Continue lasix and BB. Reviewed importance of low

## 2025-01-20 ENCOUNTER — TELEPHONE (OUTPATIENT)
Dept: CARDIOLOGY | Age: 70
End: 2025-01-20

## 2025-01-28 ENCOUNTER — TELEPHONE (OUTPATIENT)
Dept: PRIMARY CARE CLINIC | Age: 70
End: 2025-01-28

## 2025-01-28 NOTE — TELEPHONE ENCOUNTER
Recommend walk-in or appt with me. May be hemorrhoids but could be something more serious. Needs evaluation.

## 2025-01-28 NOTE — TELEPHONE ENCOUNTER
Patient called and stated that she had blood in stool this morning.  Patient would like advise on what to do.    Please advise

## 2025-01-31 ENCOUNTER — OFFICE VISIT (OUTPATIENT)
Dept: PRIMARY CARE CLINIC | Age: 70
End: 2025-01-31
Payer: MEDICARE

## 2025-01-31 VITALS
WEIGHT: 118.4 LBS | HEART RATE: 64 BPM | RESPIRATION RATE: 16 BRPM | DIASTOLIC BLOOD PRESSURE: 78 MMHG | BODY MASS INDEX: 23.12 KG/M2 | OXYGEN SATURATION: 94 % | SYSTOLIC BLOOD PRESSURE: 128 MMHG

## 2025-01-31 DIAGNOSIS — K92.1 HEMATOCHEZIA: Primary | ICD-10-CM

## 2025-01-31 PROCEDURE — 4004F PT TOBACCO SCREEN RCVD TLK: CPT | Performed by: NURSE PRACTITIONER

## 2025-01-31 PROCEDURE — 1123F ACP DISCUSS/DSCN MKR DOCD: CPT | Performed by: NURSE PRACTITIONER

## 2025-01-31 PROCEDURE — 1090F PRES/ABSN URINE INCON ASSESS: CPT | Performed by: NURSE PRACTITIONER

## 2025-01-31 PROCEDURE — G8420 CALC BMI NORM PARAMETERS: HCPCS | Performed by: NURSE PRACTITIONER

## 2025-01-31 PROCEDURE — 1159F MED LIST DOCD IN RCRD: CPT | Performed by: NURSE PRACTITIONER

## 2025-01-31 PROCEDURE — 99213 OFFICE O/P EST LOW 20 MIN: CPT | Performed by: NURSE PRACTITIONER

## 2025-01-31 PROCEDURE — 1160F RVW MEDS BY RX/DR IN RCRD: CPT | Performed by: NURSE PRACTITIONER

## 2025-01-31 PROCEDURE — 3017F COLORECTAL CA SCREEN DOC REV: CPT | Performed by: NURSE PRACTITIONER

## 2025-01-31 PROCEDURE — G8427 DOCREV CUR MEDS BY ELIG CLIN: HCPCS | Performed by: NURSE PRACTITIONER

## 2025-01-31 PROCEDURE — G8400 PT W/DXA NO RESULTS DOC: HCPCS | Performed by: NURSE PRACTITIONER

## 2025-01-31 RX ORDER — HYDROCORTISONE ACETATE 25 MG/1
25 SUPPOSITORY RECTAL EVERY 12 HOURS
Qty: 12 SUPPOSITORY | Refills: 1 | Status: SHIPPED | OUTPATIENT
Start: 2025-01-31

## 2025-01-31 ASSESSMENT — ENCOUNTER SYMPTOMS
ABDOMINAL PAIN: 0
COLOR CHANGE: 0
SHORTNESS OF BREATH: 0
NAUSEA: 0
SORE THROAT: 0
RHINORRHEA: 0
VOMITING: 0
DIARRHEA: 0
CHEST TIGHTNESS: 0

## 2025-01-31 NOTE — PROGRESS NOTES
[facility]  [facility]  [address] [place] [address]  [address]  [facility] [address]  Dept: [phone]  Dept Fax: [phone]    Sunita Watts is a 69 y.o. female who presentstoday for her medical conditions/complaints as noted below.  Sunita Watts is c/o of  Chief Complaint   Patient presents with    Rectal Bleeding     Concern for blood in stool and in toilet bowl water on 1/28/25, pt reports bright red and dark red. Has not happened since.         HPI:     History of Present Illness  The patient presents for evaluation of bright red blood in the stool. She is accompanied by her spouse.     She reported an episode of bright red blood in her stool a few days prior, which was a new occurrence for her. This incident was not associated with any pain or straining during defecation. The stool consistency was softer than usual, akin to diarrhea, but she did not experience any preceding loose stools or constipation. She does not have a history of hemorrhoids and has not detected any abnormalities upon self-examination. She occasionally experiences mild itching in the anal region, but it is not a persistent symptom. She also noted the presence of dark-colored stool during this episode which has not recurred. She has not yet completed her Cologuard test.      No results found for: \"LABA1C\"          ( goal A1C is < 7)   No components found for: \"LABMICR\"  No components found for: \"LDLCHOLESTEROL\", \"LDLCALC\"    (goal LDL is <100)   AST (U/L)   Date Value   01/02/2025 23     ALT (U/L)   Date Value   01/02/2025 13     BUN (mg/dL)   Date Value   01/02/2025 10     BP Readings from Last 3 Encounters:   01/31/25 128/78   01/15/25 138/86   01/08/25 122/82          ([id number])    Past Medical History:   Diagnosis Date    COPD (chronic obstructive pulmonary disease) (HCC) 2019    Depression       Past Surgical History:   Procedure Laterality Date    CARDIAC PROCEDURE N/A 12/8/2023    dixon /

## 2025-02-13 ENCOUNTER — TELEPHONE (OUTPATIENT)
Dept: CARDIOLOGY | Age: 70
End: 2025-02-13

## 2025-02-13 NOTE — TELEPHONE ENCOUNTER
Patient wants to proceed with [facility]      I need to make an appointment to have this done. Please advise what I need to do.     Sunita Watts  1955  [phone]     Thank you and have a great day!     Side roads are terrible. Be careful!  This encounter is not signed. The conversa

## 2025-02-20 DIAGNOSIS — R94.31 ABNORMAL EKG: ICD-10-CM

## 2025-02-20 DIAGNOSIS — R94.39 ABNORMAL STRESS TEST: ICD-10-CM

## 2025-02-20 RX ORDER — ASPIRIN 81 MG/1
81 TABLET ORAL DAILY
Qty: 90 TABLET | Refills: 3 | Status: SHIPPED | OUTPATIENT
Start: 2025-02-20

## 2025-03-08 DIAGNOSIS — B35.1 TOENAIL FUNGUS: ICD-10-CM

## 2025-03-10 RX ORDER — CICLOPIROX 80 MG/ML
SOLUTION TOPICAL
Qty: 7 ML | Refills: 2 | Status: SHIPPED | OUTPATIENT
Start: 2025-03-10

## 2025-03-13 DIAGNOSIS — E78.5 HYPERLIPIDEMIA, UNSPECIFIED HYPERLIPIDEMIA TYPE: ICD-10-CM

## 2025-03-13 RX ORDER — ROSUVASTATIN CALCIUM 20 MG/1
20 TABLET, COATED ORAL DAILY
Qty: 90 TABLET | Refills: 4 | Status: SHIPPED | OUTPATIENT
Start: 2025-03-13

## 2025-03-13 RX ORDER — ROSUVASTATIN CALCIUM 20 MG/1
20 TABLET, COATED ORAL DAILY
Qty: 90 TABLET | Refills: 3 | Status: SHIPPED | OUTPATIENT
Start: 2025-03-13

## 2025-03-14 DIAGNOSIS — E78.5 HYPERLIPIDEMIA, UNSPECIFIED HYPERLIPIDEMIA TYPE: ICD-10-CM

## 2025-03-14 RX ORDER — ROSUVASTATIN CALCIUM 20 MG/1
20 TABLET, COATED ORAL DAILY
Qty: 90 TABLET | Refills: 4 | OUTPATIENT
Start: 2025-03-14

## 2025-03-24 RX ORDER — CLOPIDOGREL BISULFATE 75 MG/1
75 TABLET ORAL DAILY
Qty: 90 TABLET | Refills: 3 | Status: SHIPPED | OUTPATIENT
Start: 2025-03-24

## 2025-03-24 NOTE — TELEPHONE ENCOUNTER
Clopidogrel 75 mg daily refills requested via fax from [facility].    Last Appt:  1/15/2025  Next Appt:   6/18/2025  Med verified in Epic

## 2025-03-25 ENCOUNTER — HOSPITAL ENCOUNTER (OUTPATIENT)
Age: 70
Setting detail: OUTPATIENT SURGERY
Discharge: HOME | End: 2025-03-25
Attending: INTERNAL MEDICINE | Admitting: INTERNAL MEDICINE
Payer: MEDICARE

## 2025-03-25 VITALS
BODY MASS INDEX: 23.83 KG/M2 | WEIGHT: 122 LBS | RESPIRATION RATE: 25 BRPM | SYSTOLIC BLOOD PRESSURE: 87 MMHG | HEART RATE: 109 BPM | OXYGEN SATURATION: 86 % | DIASTOLIC BLOOD PRESSURE: 50 MMHG | TEMPERATURE: 97.7 F

## 2025-03-25 DIAGNOSIS — R94.31 ABNORMAL HOLTER EXAM: ICD-10-CM

## 2025-03-25 DIAGNOSIS — R94.39 ABNORMAL STRESS TEST: ICD-10-CM

## 2025-03-25 LAB
BUN BLD-MCNC: 12 MG/DL (ref 8–26)
CHLORIDE BLD-SCNC: 98 MMOL/L (ref 98–107)
EGFR, POC: >90 ML/MIN/1.73M2
GLUCOSE BLD-MCNC: 100 MG/DL (ref 74–100)
HCT VFR BLD AUTO: 49 % (ref 36–46)
PLATELET # BLD AUTO: 268 K/UL (ref 138–453)
POC CREATININE: 0.6 MG/DL (ref 0.51–1.19)
POC HEMOGLOBIN (CALC): 16.8 G/DL (ref 12–16)
POTASSIUM BLD-SCNC: 4 MMOL/L (ref 3.5–4.5)
POTASSIUM BLD-SCNC: 5.9 MMOL/L (ref 3.5–4.5)
POTASSIUM BLD-SCNC: 7.7 MMOL/L (ref 3.5–4.5)
POTASSIUM SERPL-SCNC: 5.3 MMOL/L (ref 3.7–5.3)
SODIUM BLD-SCNC: 141 MMOL/L (ref 138–146)

## 2025-03-25 PROCEDURE — 76937 US GUIDE VASCULAR ACCESS: CPT | Performed by: INTERNAL MEDICINE

## 2025-03-25 PROCEDURE — 82435 ASSAY OF BLOOD CHLORIDE: CPT

## 2025-03-25 PROCEDURE — 7100000010 HC PHASE II RECOVERY - FIRST 15 MIN: Performed by: INTERNAL MEDICINE

## 2025-03-25 PROCEDURE — 84132 ASSAY OF SERUM POTASSIUM: CPT

## 2025-03-25 PROCEDURE — 82947 ASSAY GLUCOSE BLOOD QUANT: CPT

## 2025-03-25 PROCEDURE — 99152 MOD SED SAME PHYS/QHP 5/>YRS: CPT | Performed by: INTERNAL MEDICINE

## 2025-03-25 PROCEDURE — 93458 L HRT ARTERY/VENTRICLE ANGIO: CPT | Performed by: INTERNAL MEDICINE

## 2025-03-25 PROCEDURE — 84295 ASSAY OF SERUM SODIUM: CPT

## 2025-03-25 PROCEDURE — 6360000002 HC RX W HCPCS: Performed by: INTERNAL MEDICINE

## 2025-03-25 PROCEDURE — 84520 ASSAY OF UREA NITROGEN: CPT

## 2025-03-25 PROCEDURE — C1769 GUIDE WIRE: HCPCS | Performed by: INTERNAL MEDICINE

## 2025-03-25 PROCEDURE — 2580000003 HC RX 258: Performed by: INTERNAL MEDICINE

## 2025-03-25 PROCEDURE — 6370000000 HC RX 637 (ALT 250 FOR IP): Performed by: STUDENT IN AN ORGANIZED HEALTH CARE EDUCATION/TRAINING PROGRAM

## 2025-03-25 PROCEDURE — C1894 INTRO/SHEATH, NON-LASER: HCPCS | Performed by: INTERNAL MEDICINE

## 2025-03-25 PROCEDURE — 99151 MOD SED SAME PHYS/QHP <5 YRS: CPT | Performed by: INTERNAL MEDICINE

## 2025-03-25 PROCEDURE — 99153 MOD SED SAME PHYS/QHP EA: CPT | Performed by: INTERNAL MEDICINE

## 2025-03-25 PROCEDURE — 82565 ASSAY OF CREATININE: CPT

## 2025-03-25 PROCEDURE — 6360000004 HC RX CONTRAST MEDICATION: Performed by: INTERNAL MEDICINE

## 2025-03-25 PROCEDURE — 85014 HEMATOCRIT: CPT

## 2025-03-25 PROCEDURE — 7100000011 HC PHASE II RECOVERY - ADDTL 15 MIN: Performed by: INTERNAL MEDICINE

## 2025-03-25 PROCEDURE — 2580000003 HC RX 258: Performed by: STUDENT IN AN ORGANIZED HEALTH CARE EDUCATION/TRAINING PROGRAM

## 2025-03-25 PROCEDURE — 2709999900 HC NON-CHARGEABLE SUPPLY: Performed by: INTERNAL MEDICINE

## 2025-03-25 PROCEDURE — 2500000003 HC RX 250 WO HCPCS: Performed by: INTERNAL MEDICINE

## 2025-03-25 PROCEDURE — 85049 AUTOMATED PLATELET COUNT: CPT

## 2025-03-25 RX ORDER — DEXTROSE MONOHYDRATE 100 MG/ML
INJECTION, SOLUTION INTRAVENOUS CONTINUOUS PRN
Status: DISCONTINUED | OUTPATIENT
Start: 2025-03-25 | End: 2025-03-25 | Stop reason: HOSPADM

## 2025-03-25 RX ORDER — HEPARIN SODIUM 1000 [USP'U]/ML
INJECTION, SOLUTION INTRAVENOUS; SUBCUTANEOUS PRN
Status: DISCONTINUED | OUTPATIENT
Start: 2025-03-25 | End: 2025-03-25 | Stop reason: HOSPADM

## 2025-03-25 RX ORDER — MIDAZOLAM 1 MG/ML
INJECTION INTRAMUSCULAR; INTRAVENOUS PRN
Status: DISCONTINUED | OUTPATIENT
Start: 2025-03-25 | End: 2025-03-25 | Stop reason: HOSPADM

## 2025-03-25 RX ORDER — GLUCAGON 1 MG/ML
1 KIT INJECTION PRN
Status: DISCONTINUED | OUTPATIENT
Start: 2025-03-25 | End: 2025-03-25 | Stop reason: HOSPADM

## 2025-03-25 RX ORDER — IOPAMIDOL 755 MG/ML
INJECTION, SOLUTION INTRAVASCULAR PRN
Status: DISCONTINUED | OUTPATIENT
Start: 2025-03-25 | End: 2025-03-25 | Stop reason: HOSPADM

## 2025-03-25 RX ORDER — 0.9 % SODIUM CHLORIDE 0.9 %
500 INTRAVENOUS SOLUTION INTRAVENOUS ONCE
Status: COMPLETED | OUTPATIENT
Start: 2025-03-25 | End: 2025-03-25

## 2025-03-25 RX ORDER — METOPROLOL TARTRATE 25 MG/1
25 TABLET, FILM COATED ORAL 2 TIMES DAILY
Qty: 90 TABLET | Refills: 0 | Status: SHIPPED | OUTPATIENT
Start: 2025-03-25

## 2025-03-25 RX ORDER — METOPROLOL TARTRATE 25 MG/1
25 TABLET, FILM COATED ORAL 2 TIMES DAILY
Status: DISCONTINUED | OUTPATIENT
Start: 2025-03-25 | End: 2025-03-25 | Stop reason: HOSPADM

## 2025-03-25 RX ORDER — SODIUM CHLORIDE 9 MG/ML
INJECTION, SOLUTION INTRAVENOUS CONTINUOUS
Status: DISCONTINUED | OUTPATIENT
Start: 2025-03-25 | End: 2025-03-25 | Stop reason: HOSPADM

## 2025-03-25 RX ADMIN — SODIUM CHLORIDE: 9 INJECTION, SOLUTION INTRAVENOUS at 11:30

## 2025-03-25 RX ADMIN — INSULIN HUMAN 10 UNITS: 100 INJECTION, SOLUTION PARENTERAL at 14:08

## 2025-03-25 RX ADMIN — DEXTROSE MONOHYDRATE 250 ML: 100 INJECTION, SOLUTION INTRAVENOUS at 13:57

## 2025-03-25 RX ADMIN — SODIUM CHLORIDE 500 ML: 9 INJECTION, SOLUTION INTRAVENOUS at 13:57

## 2025-03-25 NOTE — PROGRESS NOTES
Patient admitted, consent signed and questions answered. Patient ready for procedure. Call light to reach with side rails up 2 of 2. Rt. Groin clipped with writer and Ivette RN present.  Family at bedside with patient.  History and physical needs completed.

## 2025-03-25 NOTE — H&P
[facility]  Procedure History and Physical Update          Patient Name: Sunita Watts  MRN:    [id number]  YOB: 1955  Date of evaluation:  3/25/2025    Procedure:    LHC +/- PCI  Indication for procedure:  Abnormal Stress Test    HPI    Patient is a 69 year old female presenting for scheduled LHC after having abnormal stress MPI. She complained of having chest pain. Hx of CAD s/p PCI    Past Medical History:   Diagnosis Date    COPD (chronic obstructive pulmonary disease) (HCC) 2019    Depression        Past Surgical History:   Procedure Laterality Date    CARDIAC CATHETERIZATION  03/25/2025    DR. AUGUSTIN    CARDIAC PROCEDURE N/A 12/08/2023    dixon / Left heart cath / coronary angiography performed by Geetha La MD at [facility] CARDIAC CATH LAB    CARDIAC PROCEDURE N/A 12/08/2023    Percutaneous coronary intervention performed by Geetha La MD at [facility] CARDIAC CATH LAB    EYE SURGERY  Cataract       Family History   Problem Relation Age of Onset    Unknown Mother     Unknown Father        No Known Allergies    Prior to Admission medications    Medication Sig Start Date End Date Taking? Authorizing Provider   clopidogrel (PLAVIX) 75 MG tablet Take 1 tablet by mouth daily 3/24/25  Yes Jesenia Middleton APRN - CNP   aspirin 81 MG EC tablet Take 1 tablet by mouth daily 2/20/25  Yes Yoly Coleman APRN - CNP   hydrocortisone (ANUSOL-HC) 25 MG suppository Place 1 suppository rectally in the morning and 1 suppository in the evening. 1/31/25  Yes Kita Newton APRN - CNP   metoprolol tartrate (LOPRESSOR) 25 MG tablet Take 1/2 (one-half) tablet by mouth twice daily 12/27/24  Yes Yoly Coleman APRN - CNP   albuterol (PROVENTIL) (2.5 MG/3ML) 0.083% nebulizer solution Take 3 mLs by nebulization every 6 hours as needed for Wheezing 12/27/24  Yes Polina Foley APRN - CNP   furosemide (LASIX) 20 MG tablet Take 1 tablet by mouth daily 12/18/24  Yes

## 2025-03-26 NOTE — PROGRESS NOTES
CLINICAL PHARMACY NOTE: MEDS TO BEDS    Total # of Prescriptions Filled: 1   The following medications were delivered to the patient:  Metoprolol tar 25 mg     Additional Documentation:  Delivered x1 to patient +1 rm pcc#11 on 3/25 at 5:58p. $2.00 paid cash.

## 2025-03-28 ENCOUNTER — TELEPHONE (OUTPATIENT)
Dept: CARDIOLOGY | Age: 70
End: 2025-03-28

## 2025-03-28 RX ORDER — CLOPIDOGREL BISULFATE 75 MG/1
75 TABLET ORAL DAILY
Qty: 90 TABLET | Refills: 3 | OUTPATIENT
Start: 2025-03-28

## 2025-03-28 NOTE — TELEPHONE ENCOUNTER
Sunita Watts       3/28/25 10:01 AM  I had a heart catheter done on Tuesday. But now my blood pressure is up. About 140/86 and my heart sounds loud. Should I increase the BP meds or should I make an appointment?     Thank you   Sunita Watts   1955  [phone]

## 2025-05-20 ENCOUNTER — TELEPHONE (OUTPATIENT)
Dept: PRIMARY CARE CLINIC | Age: 70
End: 2025-05-20

## 2025-05-20 NOTE — TELEPHONE ENCOUNTER
Left a voicemail for the patient. They are due for an AWV and we want to schedule an appointment with them. Please schedule them once they call back.

## 2025-05-30 DIAGNOSIS — R94.31 ABNORMAL HOLTER EXAM: ICD-10-CM

## 2025-05-30 RX ORDER — METOPROLOL TARTRATE 25 MG/1
25 TABLET, FILM COATED ORAL 2 TIMES DAILY
Qty: 180 TABLET | Refills: 3 | Status: SHIPPED | OUTPATIENT
Start: 2025-05-30

## 2025-06-05 DIAGNOSIS — I50.9 ACUTE CONGESTIVE HEART FAILURE, UNSPECIFIED HEART FAILURE TYPE (HCC): ICD-10-CM

## 2025-06-05 RX ORDER — FUROSEMIDE 20 MG/1
20 TABLET ORAL DAILY
Qty: 90 TABLET | Refills: 3 | Status: SHIPPED | OUTPATIENT
Start: 2025-06-05

## 2025-06-18 ENCOUNTER — OFFICE VISIT (OUTPATIENT)
Dept: CARDIOLOGY | Age: 70
End: 2025-06-18
Payer: MEDICARE

## 2025-06-18 VITALS
BODY MASS INDEX: 24.3 KG/M2 | HEIGHT: 60 IN | SYSTOLIC BLOOD PRESSURE: 112 MMHG | HEART RATE: 81 BPM | DIASTOLIC BLOOD PRESSURE: 58 MMHG | WEIGHT: 123.8 LBS

## 2025-06-18 DIAGNOSIS — F17.200 SMOKER: ICD-10-CM

## 2025-06-18 DIAGNOSIS — I10 PRIMARY HYPERTENSION: ICD-10-CM

## 2025-06-18 DIAGNOSIS — I27.81 COR PULMONALE (HCC): ICD-10-CM

## 2025-06-18 DIAGNOSIS — I25.10 CORONARY ARTERY DISEASE INVOLVING NATIVE CORONARY ARTERY OF NATIVE HEART WITHOUT ANGINA PECTORIS: Primary | ICD-10-CM

## 2025-06-18 PROCEDURE — G8420 CALC BMI NORM PARAMETERS: HCPCS | Performed by: INTERNAL MEDICINE

## 2025-06-18 PROCEDURE — 1123F ACP DISCUSS/DSCN MKR DOCD: CPT | Performed by: INTERNAL MEDICINE

## 2025-06-18 PROCEDURE — 99406 BEHAV CHNG SMOKING 3-10 MIN: CPT | Performed by: INTERNAL MEDICINE

## 2025-06-18 PROCEDURE — 1159F MED LIST DOCD IN RCRD: CPT | Performed by: INTERNAL MEDICINE

## 2025-06-18 PROCEDURE — 4004F PT TOBACCO SCREEN RCVD TLK: CPT | Performed by: INTERNAL MEDICINE

## 2025-06-18 PROCEDURE — 93010 ELECTROCARDIOGRAM REPORT: CPT | Performed by: INTERNAL MEDICINE

## 2025-06-18 PROCEDURE — 3017F COLORECTAL CA SCREEN DOC REV: CPT | Performed by: INTERNAL MEDICINE

## 2025-06-18 PROCEDURE — 3078F DIAST BP <80 MM HG: CPT | Performed by: INTERNAL MEDICINE

## 2025-06-18 PROCEDURE — 1090F PRES/ABSN URINE INCON ASSESS: CPT | Performed by: INTERNAL MEDICINE

## 2025-06-18 PROCEDURE — 3074F SYST BP LT 130 MM HG: CPT | Performed by: INTERNAL MEDICINE

## 2025-06-18 PROCEDURE — G8400 PT W/DXA NO RESULTS DOC: HCPCS | Performed by: INTERNAL MEDICINE

## 2025-06-18 PROCEDURE — G8427 DOCREV CUR MEDS BY ELIG CLIN: HCPCS | Performed by: INTERNAL MEDICINE

## 2025-06-18 PROCEDURE — 93005 ELECTROCARDIOGRAM TRACING: CPT | Performed by: INTERNAL MEDICINE

## 2025-06-18 PROCEDURE — 99214 OFFICE O/P EST MOD 30 MIN: CPT | Performed by: INTERNAL MEDICINE

## 2025-06-18 PROCEDURE — 99212 OFFICE O/P EST SF 10 MIN: CPT | Performed by: INTERNAL MEDICINE

## 2025-06-18 NOTE — PROGRESS NOTES
Today's Date: 6/18/2025  Patient's Name: Sunita Watts  Patient's age: 70 y.o., 1955    Subjective:  Sunita Watts is being seen in clinic today regarding   Chief Complaint   Patient presents with    Coronary Artery Disease    Shortness of Breath    Hyperlipidemia         She is reporting dyspnea with mild exertion. She denies any chest pain. no PND, no syncope or pre-syncope, no orthopnea. She reports intermittent leg swelling and is on lasix. She continues to smoke        Past Medical History:   has a past medical history of COPD (chronic obstructive pulmonary disease) (HCC) and Depression.    Past Surgical History:   has a past surgical history that includes eye surgery (Cataract); Cardiac procedure (N/A, 12/08/2023); Cardiac procedure (N/A, 12/08/2023); Cardiac catheterization (03/25/2025); Cardiac procedure (N/A, 3/25/2025); and invasive vascular (N/A, 3/25/2025).    Home Medications:  Prior to Admission medications    Medication Sig Start Date End Date Taking? Authorizing Provider   furosemide (LASIX) 20 MG tablet Take 1 tablet by mouth once daily 6/5/25  Yes Jesenia Middleton APRN - CNP   metoprolol tartrate (LOPRESSOR) 25 MG tablet Take 1 tablet by mouth 2 times daily 5/30/25  Yes Jesenia Middleton APRN - CNP   clopidogrel (PLAVIX) 75 MG tablet Take 1 tablet by mouth daily 3/24/25  Yes Jesenia Middleton APRN - CNP   rosuvastatin (CRESTOR) 20 MG tablet Take 1 tablet by mouth daily 3/13/25  Yes Yoly Coleman APRN - CNP   aspirin 81 MG EC tablet Take 1 tablet by mouth daily 2/20/25  Yes Yoly Coleman APRN - CNP   albuterol (PROVENTIL) (2.5 MG/3ML) 0.083% nebulizer solution Take 3 mLs by nebulization every 6 hours as needed for Wheezing 12/27/24  Yes Polina Foley APRN - CNP       Allergies:  Patient has no known allergies.    Social History:   reports that she has been smoking cigarettes. She started smoking about 59 years ago. She has a 30.4 pack-year smoking

## 2025-06-24 ENCOUNTER — HOSPITAL ENCOUNTER (INPATIENT)
Age: 70
LOS: 8 days | Discharge: HOME | DRG: 286 | End: 2025-07-02
Attending: STUDENT IN AN ORGANIZED HEALTH CARE EDUCATION/TRAINING PROGRAM
Payer: MEDICARE

## 2025-06-24 ENCOUNTER — APPOINTMENT (OUTPATIENT)
Dept: CT IMAGING | Age: 70
End: 2025-06-24
Payer: MEDICARE

## 2025-06-24 ENCOUNTER — HOSPITAL ENCOUNTER (EMERGENCY)
Age: 70
Discharge: ANOTHER HOSPITAL | End: 2025-06-24
Attending: EMERGENCY MEDICINE
Payer: MEDICARE

## 2025-06-24 VITALS
TEMPERATURE: 98.4 F | HEIGHT: 60 IN | SYSTOLIC BLOOD PRESSURE: 128 MMHG | WEIGHT: 120 LBS | OXYGEN SATURATION: 94 % | BODY MASS INDEX: 23.56 KG/M2 | HEART RATE: 100 BPM | RESPIRATION RATE: 18 BRPM | DIASTOLIC BLOOD PRESSURE: 90 MMHG

## 2025-06-24 DIAGNOSIS — I27.20 PULMONARY HYPERTENSION (HCC): ICD-10-CM

## 2025-06-24 DIAGNOSIS — J96.21 ACUTE ON CHRONIC RESPIRATORY FAILURE WITH HYPOXIA AND HYPERCAPNIA (HCC): Primary | ICD-10-CM

## 2025-06-24 DIAGNOSIS — I26.99 ACUTE PULMONARY EMBOLISM, UNSPECIFIED PULMONARY EMBOLISM TYPE, UNSPECIFIED WHETHER ACUTE COR PULMONALE PRESENT (HCC): Primary | ICD-10-CM

## 2025-06-24 DIAGNOSIS — R74.01 TRANSAMINITIS: ICD-10-CM

## 2025-06-24 DIAGNOSIS — R79.89 ELEVATED BRAIN NATRIURETIC PEPTIDE (BNP) LEVEL: ICD-10-CM

## 2025-06-24 DIAGNOSIS — E87.6 HYPOKALEMIA: ICD-10-CM

## 2025-06-24 DIAGNOSIS — R93.5 ABNORMAL CT OF THE ABDOMEN: ICD-10-CM

## 2025-06-24 DIAGNOSIS — J44.1 COPD WITH ACUTE EXACERBATION (HCC): ICD-10-CM

## 2025-06-24 DIAGNOSIS — J96.22 ACUTE ON CHRONIC RESPIRATORY FAILURE WITH HYPOXIA AND HYPERCAPNIA (HCC): Primary | ICD-10-CM

## 2025-06-24 DIAGNOSIS — N17.9 AKI (ACUTE KIDNEY INJURY): ICD-10-CM

## 2025-06-24 DIAGNOSIS — I26.94 MULTIPLE SUBSEGMENTAL PULMONARY EMBOLI WITHOUT ACUTE COR PULMONALE (HCC): ICD-10-CM

## 2025-06-24 DIAGNOSIS — J44.1 COPD EXACERBATION (HCC): ICD-10-CM

## 2025-06-24 DIAGNOSIS — J18.9 MULTIFOCAL PNEUMONIA: ICD-10-CM

## 2025-06-24 DIAGNOSIS — I21.4 NSTEMI (NON-ST ELEVATED MYOCARDIAL INFARCTION) (HCC): ICD-10-CM

## 2025-06-24 PROBLEM — J96.20 ACUTE ON CHRONIC RESPIRATORY FAILURE (HCC): Status: ACTIVE | Noted: 2025-06-24

## 2025-06-24 LAB
ALBUMIN SERPL-MCNC: 4.1 G/DL (ref 3.5–5.2)
ALBUMIN/GLOB SERPL: 1.4 {RATIO} (ref 1–2.5)
ALP SERPL-CCNC: 459 U/L (ref 35–104)
ALT SERPL-CCNC: 958 U/L (ref 10–35)
ANION GAP SERPL CALCULATED.3IONS-SCNC: 15 MMOL/L (ref 9–16)
ANTI-XA UNFRAC HEPARIN: 0.78 IU/L
AST SERPL-CCNC: 827 U/L (ref 10–35)
BASOPHILS # BLD: 0.04 K/UL (ref 0–0.2)
BASOPHILS NFR BLD: 0 % (ref 0–2)
BILIRUB SERPL-MCNC: 2.2 MG/DL (ref 0–1.2)
BNP SERPL-MCNC: ABNORMAL PG/ML (ref 0–125)
BUN SERPL-MCNC: 41 MG/DL (ref 8–23)
BUN/CREAT SERPL: 27 (ref 9–20)
CALCIUM SERPL-MCNC: 9.3 MG/DL (ref 8.6–10.4)
CHLORIDE SERPL-SCNC: 89 MMOL/L (ref 98–107)
CO2 SERPL-SCNC: 33 MMOL/L (ref 20–31)
CREAT SERPL-MCNC: 1.5 MG/DL (ref 0.6–0.9)
D DIMER PPP FEU-MCNC: 4.16 UG/ML FEU (ref 0–0.59)
EOSINOPHIL # BLD: <0.03 K/UL (ref 0–0.44)
EOSINOPHILS RELATIVE PERCENT: 0 % (ref 1–4)
ERYTHROCYTE [DISTWIDTH] IN BLOOD BY AUTOMATED COUNT: 15.6 % (ref 11.8–14.4)
ERYTHROCYTE [DISTWIDTH] IN BLOOD BY AUTOMATED COUNT: 15.7 % (ref 11.8–14.4)
FLUAV AG SPEC QL: NEGATIVE
FLUBV AG SPEC QL: NEGATIVE
GFR, ESTIMATED: 37 ML/MIN/1.73M2
GLUCOSE SERPL-MCNC: 125 MG/DL (ref 74–99)
HCO3 VENOUS: 39.2 MMOL/L (ref 22–29)
HCT VFR BLD AUTO: 44.9 % (ref 36.3–47.1)
HCT VFR BLD AUTO: 48.9 % (ref 36.3–47.1)
HETEROPH AB BLD QL IA: NEGATIVE
HGB BLD-MCNC: 13.2 G/DL (ref 11.9–15.1)
HGB BLD-MCNC: 14.5 G/DL (ref 11.9–15.1)
IMM GRANULOCYTES # BLD AUTO: 0.11 K/UL (ref 0–0.3)
IMM GRANULOCYTES NFR BLD: 1 %
INR PPP: 1.4
INR PPP: 1.6
LACTATE BLDV-SCNC: 2 MMOL/L (ref 0.5–2.2)
LIPASE SERPL-CCNC: 26 U/L (ref 13–60)
LYMPHOCYTES NFR BLD: 0.65 K/UL (ref 1.1–3.7)
LYMPHOCYTES RELATIVE PERCENT: 5 % (ref 24–43)
MAGNESIUM SERPL-MCNC: 2.1 MG/DL (ref 1.6–2.4)
MCH RBC QN AUTO: 24.8 PG (ref 25.2–33.5)
MCH RBC QN AUTO: 25 PG (ref 25.2–33.5)
MCHC RBC AUTO-ENTMCNC: 29.4 G/DL (ref 28.4–34.8)
MCHC RBC AUTO-ENTMCNC: 29.7 G/DL (ref 25.2–33.5)
MCV RBC AUTO: 83.7 FL (ref 82.6–102.9)
MCV RBC AUTO: 84.9 FL (ref 82.6–102.9)
MONOCYTES NFR BLD: 1.5 K/UL (ref 0.1–1.2)
MONOCYTES NFR BLD: 12 % (ref 3–12)
NEUTROPHILS NFR BLD: 82 % (ref 36–65)
NEUTS SEG NFR BLD: 9.98 K/UL (ref 1.5–8.1)
NRBC BLD-RTO: 0.2 PER 100 WBC
NRBC BLD-RTO: 0.6 PER 100 WBC
O2 SAT, VEN: 67.6 % (ref 60–85)
PARTIAL THROMBOPLASTIN TIME: 170 SEC (ref 23–36.5)
PARTIAL THROMBOPLASTIN TIME: 26.4 SEC (ref 23.9–33.8)
PCO2 VENOUS: 60.4 MM HG (ref 41–51)
PH VENOUS: 7.42 (ref 7.32–7.43)
PLATELET # BLD AUTO: 242 K/UL (ref 138–453)
PLATELET # BLD AUTO: 283 K/UL (ref 138–453)
PMV BLD AUTO: 10.4 FL (ref 8.1–13.5)
PMV BLD AUTO: 10.7 FL (ref 8.1–13.5)
PO2 VENOUS: 35.9 MM HG (ref 30–50)
POSITIVE BASE EXCESS, VEN: 11.3 MMOL/L (ref 0–3)
POTASSIUM SERPL-SCNC: 3.3 MMOL/L (ref 3.7–5.3)
PROCALCITONIN SERPL-MCNC: 0.25 NG/ML (ref 0–0.09)
PROT SERPL-MCNC: 7 G/DL (ref 6.6–8.7)
PROTHROMBIN TIME: 17 SEC (ref 11.5–14.2)
PROTHROMBIN TIME: 19.8 SEC (ref 11.7–14.9)
RBC # BLD AUTO: 5.29 M/UL (ref 3.95–5.11)
RBC # BLD AUTO: 5.84 M/UL (ref 3.95–5.11)
RBC # BLD: ABNORMAL 10*6/UL
RSV BY PCR: NEGATIVE
SARS-COV-2 RDRP RESP QL NAA+PROBE: NOT DETECTED
SODIUM SERPL-SCNC: 137 MMOL/L (ref 136–145)
SPECIMEN DESCRIPTION: NORMAL
SPECIMEN SOURCE: NORMAL
TROPONIN I SERPL HS-MCNC: 249 NG/L (ref 0–14)
TROPONIN I SERPL HS-MCNC: 301 NG/L (ref 0–14)
TROPONIN I SERPL HS-MCNC: 312 NG/L (ref 0–14)
WBC OTHER # BLD: 10 K/UL (ref 3.5–11.3)
WBC OTHER # BLD: 12.3 K/UL (ref 3.5–11.3)

## 2025-06-24 PROCEDURE — 84145 PROCALCITONIN (PCT): CPT

## 2025-06-24 PROCEDURE — 82803 BLOOD GASES ANY COMBINATION: CPT

## 2025-06-24 PROCEDURE — 2580000003 HC RX 258

## 2025-06-24 PROCEDURE — 2709999900 CT ABDOMEN PELVIS W IV CONTRAST

## 2025-06-24 PROCEDURE — 87798 DETECT AGENT NOS DNA AMP: CPT

## 2025-06-24 PROCEDURE — 85379 FIBRIN DEGRADATION QUANT: CPT

## 2025-06-24 PROCEDURE — 87635 SARS-COV-2 COVID-19 AMP PRB: CPT

## 2025-06-24 PROCEDURE — 99291 CRITICAL CARE FIRST HOUR: CPT

## 2025-06-24 PROCEDURE — 96365 THER/PROPH/DIAG IV INF INIT: CPT

## 2025-06-24 PROCEDURE — 85025 COMPLETE CBC W/AUTO DIFF WBC: CPT

## 2025-06-24 PROCEDURE — 6360000002 HC RX W HCPCS: Performed by: NURSE PRACTITIONER

## 2025-06-24 PROCEDURE — 2709999900 CT CHEST PULMONARY EMBOLISM W CONTRAST

## 2025-06-24 PROCEDURE — 85730 THROMBOPLASTIN TIME PARTIAL: CPT

## 2025-06-24 PROCEDURE — 84484 ASSAY OF TROPONIN QUANT: CPT

## 2025-06-24 PROCEDURE — 94660 CPAP INITIATION&MGMT: CPT

## 2025-06-24 PROCEDURE — 83735 ASSAY OF MAGNESIUM: CPT

## 2025-06-24 PROCEDURE — 6370000000 HC RX 637 (ALT 250 FOR IP)

## 2025-06-24 PROCEDURE — 5A0935A ASSISTANCE WITH RESPIRATORY VENTILATION, LESS THAN 24 CONSECUTIVE HOURS, HIGH NASAL FLOW/VELOCITY: ICD-10-PCS | Performed by: INTERNAL MEDICINE

## 2025-06-24 PROCEDURE — 94761 N-INVAS EAR/PLS OXIMETRY MLT: CPT

## 2025-06-24 PROCEDURE — 2700000000 HC OXYGEN THERAPY PER DAY

## 2025-06-24 PROCEDURE — 87804 INFLUENZA ASSAY W/OPTIC: CPT

## 2025-06-24 PROCEDURE — 96374 THER/PROPH/DIAG INJ IV PUSH: CPT

## 2025-06-24 PROCEDURE — 96367 TX/PROPH/DG ADDL SEQ IV INF: CPT

## 2025-06-24 PROCEDURE — 83690 ASSAY OF LIPASE: CPT

## 2025-06-24 PROCEDURE — 6360000002 HC RX W HCPCS: Performed by: EMERGENCY MEDICINE

## 2025-06-24 PROCEDURE — 0202U NFCT DS 22 TRGT SARS-COV-2: CPT

## 2025-06-24 PROCEDURE — 87040 BLOOD CULTURE FOR BACTERIA: CPT

## 2025-06-24 PROCEDURE — 5A09357 ASSISTANCE WITH RESPIRATORY VENTILATION, LESS THAN 24 CONSECUTIVE HOURS, CONTINUOUS POSITIVE AIRWAY PRESSURE: ICD-10-PCS | Performed by: INTERNAL MEDICINE

## 2025-06-24 PROCEDURE — 2580000003 HC RX 258: Performed by: EMERGENCY MEDICINE

## 2025-06-24 PROCEDURE — 96366 THER/PROPH/DIAG IV INF ADDON: CPT

## 2025-06-24 PROCEDURE — 85610 PROTHROMBIN TIME: CPT

## 2025-06-24 PROCEDURE — 93005 ELECTROCARDIOGRAM TRACING: CPT | Performed by: EMERGENCY MEDICINE

## 2025-06-24 PROCEDURE — 86308 HETEROPHILE ANTIBODY SCREEN: CPT

## 2025-06-24 PROCEDURE — 83605 ASSAY OF LACTIC ACID: CPT

## 2025-06-24 PROCEDURE — 85520 HEPARIN ASSAY: CPT

## 2025-06-24 PROCEDURE — 36415 COLL VENOUS BLD VENIPUNCTURE: CPT

## 2025-06-24 PROCEDURE — 6370000000 HC RX 637 (ALT 250 FOR IP): Performed by: EMERGENCY MEDICINE

## 2025-06-24 PROCEDURE — 85027 COMPLETE CBC AUTOMATED: CPT

## 2025-06-24 PROCEDURE — 80053 COMPREHEN METABOLIC PANEL: CPT

## 2025-06-24 PROCEDURE — 2060000000 HC ICU INTERMEDIATE R&B

## 2025-06-24 PROCEDURE — 6360000004 HC RX CONTRAST MEDICATION: Performed by: EMERGENCY MEDICINE

## 2025-06-24 PROCEDURE — 2500000003 HC RX 250 WO HCPCS: Performed by: EMERGENCY MEDICINE

## 2025-06-24 PROCEDURE — 96375 TX/PRO/DX INJ NEW DRUG ADDON: CPT

## 2025-06-24 PROCEDURE — 83880 ASSAY OF NATRIURETIC PEPTIDE: CPT

## 2025-06-24 PROCEDURE — 94640 AIRWAY INHALATION TREATMENT: CPT

## 2025-06-24 RX ORDER — AZITHROMYCIN 250 MG/1
250 TABLET, FILM COATED ORAL DAILY
Status: COMPLETED | OUTPATIENT
Start: 2025-06-26 | End: 2025-06-28

## 2025-06-24 RX ORDER — IOPAMIDOL 755 MG/ML
75 INJECTION, SOLUTION INTRAVASCULAR
Status: COMPLETED | OUTPATIENT
Start: 2025-06-24 | End: 2025-06-24

## 2025-06-24 RX ORDER — ACETAMINOPHEN 650 MG/1
650 SUPPOSITORY RECTAL EVERY 6 HOURS PRN
Status: DISCONTINUED | OUTPATIENT
Start: 2025-06-24 | End: 2025-07-02 | Stop reason: HOSPADM

## 2025-06-24 RX ORDER — HEPARIN SODIUM 10000 [USP'U]/100ML
5-30 INJECTION, SOLUTION INTRAVENOUS CONTINUOUS
Status: DISCONTINUED | OUTPATIENT
Start: 2025-06-24 | End: 2025-06-24 | Stop reason: HOSPADM

## 2025-06-24 RX ORDER — IPRATROPIUM BROMIDE AND ALBUTEROL SULFATE 2.5; .5 MG/3ML; MG/3ML
SOLUTION RESPIRATORY (INHALATION)
Status: COMPLETED
Start: 2025-06-24 | End: 2025-06-24

## 2025-06-24 RX ORDER — POTASSIUM CHLORIDE 7.45 MG/ML
10 INJECTION INTRAVENOUS ONCE
Status: COMPLETED | OUTPATIENT
Start: 2025-06-24 | End: 2025-06-24

## 2025-06-24 RX ORDER — BENZONATATE 100 MG/1
100 CAPSULE ORAL 3 TIMES DAILY PRN
Status: DISCONTINUED | OUTPATIENT
Start: 2025-06-24 | End: 2025-07-02 | Stop reason: HOSPADM

## 2025-06-24 RX ORDER — LEVALBUTEROL INHALATION SOLUTION 1.25 MG/3ML
SOLUTION RESPIRATORY (INHALATION)
Status: DISCONTINUED
Start: 2025-06-24 | End: 2025-06-24 | Stop reason: HOSPADM

## 2025-06-24 RX ORDER — METOPROLOL TARTRATE 25 MG/1
25 TABLET, FILM COATED ORAL 2 TIMES DAILY
Status: DISCONTINUED | OUTPATIENT
Start: 2025-06-24 | End: 2025-07-02 | Stop reason: HOSPADM

## 2025-06-24 RX ORDER — ASPIRIN 81 MG/1
324 TABLET, CHEWABLE ORAL ONCE
Status: COMPLETED | OUTPATIENT
Start: 2025-06-24 | End: 2025-06-24

## 2025-06-24 RX ORDER — SODIUM CHLORIDE 9 MG/ML
INJECTION, SOLUTION INTRAVENOUS PRN
Status: DISCONTINUED | OUTPATIENT
Start: 2025-06-24 | End: 2025-07-02 | Stop reason: HOSPADM

## 2025-06-24 RX ORDER — ALBUTEROL SULFATE 0.83 MG/ML
2.5 SOLUTION RESPIRATORY (INHALATION) EVERY 6 HOURS PRN
Status: DISCONTINUED | OUTPATIENT
Start: 2025-06-24 | End: 2025-07-02 | Stop reason: HOSPADM

## 2025-06-24 RX ORDER — ROSUVASTATIN CALCIUM 20 MG/1
20 TABLET, COATED ORAL DAILY
Status: DISCONTINUED | OUTPATIENT
Start: 2025-06-25 | End: 2025-07-02 | Stop reason: HOSPADM

## 2025-06-24 RX ORDER — METHYLPREDNISOLONE SODIUM SUCCINATE 125 MG/2ML
125 INJECTION INTRAMUSCULAR; INTRAVENOUS ONCE
Status: COMPLETED | OUTPATIENT
Start: 2025-06-24 | End: 2025-06-24

## 2025-06-24 RX ORDER — ACETAMINOPHEN 325 MG/1
650 TABLET ORAL EVERY 6 HOURS PRN
Status: DISCONTINUED | OUTPATIENT
Start: 2025-06-24 | End: 2025-07-02 | Stop reason: HOSPADM

## 2025-06-24 RX ORDER — SODIUM CHLORIDE FOR INHALATION 0.9 %
3 VIAL, NEBULIZER (ML) INHALATION ONCE
Status: DISCONTINUED | OUTPATIENT
Start: 2025-06-24 | End: 2025-06-24 | Stop reason: HOSPADM

## 2025-06-24 RX ORDER — HEPARIN SODIUM 1000 [USP'U]/ML
40 INJECTION, SOLUTION INTRAVENOUS; SUBCUTANEOUS PRN
Status: DISCONTINUED | OUTPATIENT
Start: 2025-06-24 | End: 2025-06-24 | Stop reason: HOSPADM

## 2025-06-24 RX ORDER — HEPARIN SODIUM 10000 [USP'U]/100ML
5-30 INJECTION, SOLUTION INTRAVENOUS CONTINUOUS
Status: DISPENSED | OUTPATIENT
Start: 2025-06-24 | End: 2025-07-01

## 2025-06-24 RX ORDER — HEPARIN SODIUM 1000 [USP'U]/ML
60 INJECTION, SOLUTION INTRAVENOUS; SUBCUTANEOUS PRN
Status: DISCONTINUED | OUTPATIENT
Start: 2025-06-24 | End: 2025-07-01

## 2025-06-24 RX ORDER — IPRATROPIUM BROMIDE AND ALBUTEROL SULFATE 2.5; .5 MG/3ML; MG/3ML
1 SOLUTION RESPIRATORY (INHALATION) EVERY 4 HOURS PRN
Status: DISCONTINUED | OUTPATIENT
Start: 2025-06-24 | End: 2025-06-25

## 2025-06-24 RX ORDER — NITROGLYCERIN 0.4 MG/1
0.4 TABLET SUBLINGUAL EVERY 5 MIN PRN
Status: DISCONTINUED | OUTPATIENT
Start: 2025-06-24 | End: 2025-07-02 | Stop reason: HOSPADM

## 2025-06-24 RX ORDER — SODIUM CHLORIDE 0.9 % (FLUSH) 0.9 %
5-40 SYRINGE (ML) INJECTION EVERY 12 HOURS SCHEDULED
Status: DISCONTINUED | OUTPATIENT
Start: 2025-06-24 | End: 2025-07-02 | Stop reason: HOSPADM

## 2025-06-24 RX ORDER — HEPARIN SODIUM 1000 [USP'U]/ML
30 INJECTION, SOLUTION INTRAVENOUS; SUBCUTANEOUS PRN
Status: DISCONTINUED | OUTPATIENT
Start: 2025-06-24 | End: 2025-07-01

## 2025-06-24 RX ORDER — LEVALBUTEROL 1.25 MG/.5ML
1.25 SOLUTION, CONCENTRATE RESPIRATORY (INHALATION) ONCE
Status: DISCONTINUED | OUTPATIENT
Start: 2025-06-24 | End: 2025-06-24 | Stop reason: HOSPADM

## 2025-06-24 RX ORDER — ONDANSETRON 4 MG/1
4 TABLET, ORALLY DISINTEGRATING ORAL EVERY 8 HOURS PRN
Status: DISCONTINUED | OUTPATIENT
Start: 2025-06-24 | End: 2025-07-02 | Stop reason: HOSPADM

## 2025-06-24 RX ORDER — CLOPIDOGREL BISULFATE 75 MG/1
75 TABLET ORAL DAILY
Status: DISCONTINUED | OUTPATIENT
Start: 2025-06-25 | End: 2025-06-27

## 2025-06-24 RX ORDER — SODIUM CHLORIDE 0.9 % (FLUSH) 0.9 %
10 SYRINGE (ML) INJECTION PRN
Status: DISCONTINUED | OUTPATIENT
Start: 2025-06-24 | End: 2025-07-02 | Stop reason: HOSPADM

## 2025-06-24 RX ORDER — ONDANSETRON 2 MG/ML
4 INJECTION INTRAMUSCULAR; INTRAVENOUS ONCE
Status: COMPLETED | OUTPATIENT
Start: 2025-06-24 | End: 2025-06-24

## 2025-06-24 RX ORDER — HEPARIN SODIUM 1000 [USP'U]/ML
80 INJECTION, SOLUTION INTRAVENOUS; SUBCUTANEOUS ONCE
Status: COMPLETED | OUTPATIENT
Start: 2025-06-24 | End: 2025-06-24

## 2025-06-24 RX ORDER — ONDANSETRON 2 MG/ML
4 INJECTION INTRAMUSCULAR; INTRAVENOUS EVERY 6 HOURS PRN
Status: DISCONTINUED | OUTPATIENT
Start: 2025-06-24 | End: 2025-07-02 | Stop reason: HOSPADM

## 2025-06-24 RX ORDER — HEPARIN SODIUM 1000 [USP'U]/ML
60 INJECTION, SOLUTION INTRAVENOUS; SUBCUTANEOUS ONCE
Status: DISCONTINUED | OUTPATIENT
Start: 2025-06-24 | End: 2025-06-24

## 2025-06-24 RX ORDER — IPRATROPIUM BROMIDE AND ALBUTEROL SULFATE 2.5; .5 MG/3ML; MG/3ML
1 SOLUTION RESPIRATORY (INHALATION) ONCE
Status: COMPLETED | OUTPATIENT
Start: 2025-06-24 | End: 2025-06-24

## 2025-06-24 RX ORDER — HEPARIN SODIUM 1000 [USP'U]/ML
80 INJECTION, SOLUTION INTRAVENOUS; SUBCUTANEOUS PRN
Status: DISCONTINUED | OUTPATIENT
Start: 2025-06-24 | End: 2025-06-24 | Stop reason: HOSPADM

## 2025-06-24 RX ORDER — HEPARIN SODIUM 10000 [USP'U]/100ML
5-30 INJECTION, SOLUTION INTRAVENOUS CONTINUOUS
Status: DISCONTINUED | OUTPATIENT
Start: 2025-06-24 | End: 2025-06-24

## 2025-06-24 RX ORDER — SODIUM CHLORIDE 9 MG/ML
INJECTION, SOLUTION INTRAVENOUS CONTINUOUS
Status: DISCONTINUED | OUTPATIENT
Start: 2025-06-24 | End: 2025-06-25

## 2025-06-24 RX ORDER — ASPIRIN 81 MG/1
81 TABLET ORAL DAILY
Status: DISCONTINUED | OUTPATIENT
Start: 2025-06-25 | End: 2025-06-27

## 2025-06-24 RX ORDER — LEVALBUTEROL INHALATION SOLUTION 1.25 MG/3ML
1.25 SOLUTION RESPIRATORY (INHALATION) ONCE
Status: COMPLETED | OUTPATIENT
Start: 2025-06-24 | End: 2025-06-24

## 2025-06-24 RX ADMIN — IPRATROPIUM BROMIDE AND ALBUTEROL SULFATE 1 DOSE: .5; 2.5 SOLUTION RESPIRATORY (INHALATION) at 13:38

## 2025-06-24 RX ADMIN — HEPARIN SODIUM 4400 UNITS: 1000 INJECTION INTRAVENOUS; SUBCUTANEOUS at 17:08

## 2025-06-24 RX ADMIN — HEPARIN SODIUM 18 UNITS/KG/HR: 10000 INJECTION, SOLUTION INTRAVENOUS at 23:10

## 2025-06-24 RX ADMIN — WATER 1000 MG: 1 INJECTION INTRAMUSCULAR; INTRAVENOUS; SUBCUTANEOUS at 17:36

## 2025-06-24 RX ADMIN — IOPAMIDOL 75 ML: 755 INJECTION, SOLUTION INTRAVENOUS at 15:02

## 2025-06-24 RX ADMIN — LEVALBUTEROL HYDROCHLORIDE 1.25 MG: 1.25 SOLUTION RESPIRATORY (INHALATION) at 16:40

## 2025-06-24 RX ADMIN — SODIUM CHLORIDE: 0.9 INJECTION, SOLUTION INTRAVENOUS at 23:08

## 2025-06-24 RX ADMIN — ONDANSETRON 4 MG: 2 INJECTION, SOLUTION INTRAMUSCULAR; INTRAVENOUS at 13:58

## 2025-06-24 RX ADMIN — METHYLPREDNISOLONE SODIUM SUCCINATE 125 MG: 125 INJECTION, POWDER, FOR SOLUTION INTRAMUSCULAR; INTRAVENOUS at 13:58

## 2025-06-24 RX ADMIN — METOPROLOL TARTRATE 25 MG: 25 TABLET, FILM COATED ORAL at 23:12

## 2025-06-24 RX ADMIN — HEPARIN SODIUM AND DEXTROSE 18 UNITS/KG/HR: 10000; 5 INJECTION INTRAVENOUS at 17:12

## 2025-06-24 RX ADMIN — ASPIRIN 324 MG: 81 TABLET, CHEWABLE ORAL at 15:31

## 2025-06-24 RX ADMIN — IPRATROPIUM BROMIDE AND ALBUTEROL SULFATE 1 DOSE: 2.5; .5 SOLUTION RESPIRATORY (INHALATION) at 13:38

## 2025-06-24 RX ADMIN — AZITHROMYCIN MONOHYDRATE 500 MG: 500 INJECTION, POWDER, LYOPHILIZED, FOR SOLUTION INTRAVENOUS at 17:44

## 2025-06-24 RX ADMIN — POTASSIUM CHLORIDE 10 MEQ: 7.46 INJECTION, SOLUTION INTRAVENOUS at 15:29

## 2025-06-24 ASSESSMENT — LIFESTYLE VARIABLES
HOW MANY STANDARD DRINKS CONTAINING ALCOHOL DO YOU HAVE ON A TYPICAL DAY: PATIENT DOES NOT DRINK
HOW OFTEN DO YOU HAVE A DRINK CONTAINING ALCOHOL: NEVER
HOW OFTEN DO YOU HAVE A DRINK CONTAINING ALCOHOL: NEVER
HOW MANY STANDARD DRINKS CONTAINING ALCOHOL DO YOU HAVE ON A TYPICAL DAY: PATIENT DOES NOT DRINK

## 2025-06-24 ASSESSMENT — ENCOUNTER SYMPTOMS
SORE THROAT: 0
NAUSEA: 1
COUGH: 1
VOMITING: 1
SHORTNESS OF BREATH: 1
DIARRHEA: 0
ABDOMINAL PAIN: 0
CONSTIPATION: 0
BACK PAIN: 0
RHINORRHEA: 0

## 2025-06-24 ASSESSMENT — PAIN SCALES - GENERAL
PAINLEVEL_OUTOF10: 0

## 2025-06-24 ASSESSMENT — PAIN - FUNCTIONAL ASSESSMENT
PAIN_FUNCTIONAL_ASSESSMENT: 0-10

## 2025-06-24 NOTE — ED NOTES
Pt POX 78% on room air on arrival and placed on 2 lpm NC O2 and POX 85% and then changed to 4 lpm and POX up to 95%. RT called for EKG and pt placed on Cardiac monitor.

## 2025-06-25 ENCOUNTER — APPOINTMENT (OUTPATIENT)
Dept: ULTRASOUND IMAGING | Age: 70
DRG: 286 | End: 2025-06-25
Attending: STUDENT IN AN ORGANIZED HEALTH CARE EDUCATION/TRAINING PROGRAM
Payer: MEDICARE

## 2025-06-25 ENCOUNTER — APPOINTMENT (OUTPATIENT)
Dept: GENERAL RADIOLOGY | Age: 70
DRG: 286 | End: 2025-06-25
Attending: STUDENT IN AN ORGANIZED HEALTH CARE EDUCATION/TRAINING PROGRAM
Payer: MEDICARE

## 2025-06-25 ENCOUNTER — APPOINTMENT (OUTPATIENT)
Dept: VASCULAR LAB | Age: 70
DRG: 286 | End: 2025-06-25
Attending: INTERNAL MEDICINE
Payer: MEDICARE

## 2025-06-25 PROBLEM — I27.20 PULMONARY HYPERTENSION (HCC): Status: ACTIVE | Noted: 2025-06-25

## 2025-06-25 PROBLEM — J44.1 COPD WITH ACUTE EXACERBATION (HCC): Status: ACTIVE | Noted: 2025-06-25

## 2025-06-25 PROBLEM — N17.9 AKI (ACUTE KIDNEY INJURY): Status: ACTIVE | Noted: 2025-06-25

## 2025-06-25 PROBLEM — R74.01 TRANSAMINITIS: Status: ACTIVE | Noted: 2025-06-25

## 2025-06-25 PROBLEM — I50.33 ACUTE ON CHRONIC DIASTOLIC CONGESTIVE HEART FAILURE (HCC): Status: ACTIVE | Noted: 2025-06-25

## 2025-06-25 PROBLEM — K76.0 FATTY LIVER: Status: ACTIVE | Noted: 2025-06-25

## 2025-06-25 PROBLEM — R80.9 NEPHROTIC RANGE PROTEINURIA: Status: ACTIVE | Noted: 2025-06-25

## 2025-06-25 PROBLEM — F17.200 TOBACCO DEPENDENCE: Status: ACTIVE | Noted: 2025-06-25

## 2025-06-25 PROBLEM — I26.99 ACUTE PULMONARY EMBOLISM (HCC): Status: ACTIVE | Noted: 2025-06-25

## 2025-06-25 PROBLEM — I10 PRIMARY HYPERTENSION: Status: ACTIVE | Noted: 2025-06-25

## 2025-06-25 LAB
A1AT SERPL-MCNC: 161 MG/DL (ref 90–200)
AFP SERPL-MCNC: 8 UG/L
AFP SERPL-MCNC: 8 UG/L
ALBUMIN SERPL-MCNC: 3.7 G/DL (ref 3.5–5.2)
ALBUMIN/GLOB SERPL: 1.3 {RATIO} (ref 1–2.5)
ALP SERPL-CCNC: 424 U/L (ref 35–104)
ALT SERPL-CCNC: 964 U/L (ref 10–35)
ANION GAP SERPL CALCULATED.3IONS-SCNC: 14 MMOL/L (ref 9–16)
ANTI-XA UNFRAC HEPARIN: 0.58 IU/L
ANTI-XA UNFRAC HEPARIN: 0.76 IU/L
ANTI-XA UNFRAC HEPARIN: 1.01 IU/L
AST SERPL-CCNC: 765 U/L (ref 10–35)
B PARAP IS1001 DNA NPH QL NAA+NON-PROBE: NOT DETECTED
B PERT DNA SPEC QL NAA+PROBE: NOT DETECTED
BACTERIA URNS QL MICRO: NORMAL
BASOPHILS # BLD: 0 K/UL (ref 0–0.2)
BASOPHILS NFR BLD: 0 % (ref 0–2)
BILIRUB DIRECT SERPL-MCNC: 1.3 MG/DL (ref 0–0.2)
BILIRUB INDIRECT SERPL-MCNC: 0.6 MG/DL (ref 0–1)
BILIRUB SERPL-MCNC: 1.9 MG/DL (ref 0–1.2)
BILIRUB UR QL STRIP: NEGATIVE
BNP SERPL-MCNC: ABNORMAL PG/ML (ref 0–125)
BUN SERPL-MCNC: 44 MG/DL (ref 8–23)
C PNEUM DNA NPH QL NAA+NON-PROBE: NOT DETECTED
C4 SERPL-MCNC: 21 MG/DL (ref 10–40)
CALCIUM SERPL-MCNC: 8.1 MG/DL (ref 8.6–10.4)
CASTS #/AREA URNS LPF: NORMAL /LPF (ref 0–8)
CERULOPLASMIN SERPL-MCNC: 44 MG/DL (ref 16–45)
CHLORIDE SERPL-SCNC: 92 MMOL/L (ref 98–107)
CHOLEST SERPL-MCNC: 108 MG/DL (ref 0–199)
CHOLESTEROL/HDL RATIO: 2.2
CLARITY UR: ABNORMAL
CMV IGM SERPL QL IA: 0.2
CO2 SERPL-SCNC: 28 MMOL/L (ref 20–31)
COLOR UR: YELLOW
CREAT SERPL-MCNC: 1.7 MG/DL (ref 0.6–0.9)
CREAT UR-MCNC: 45.9 MG/DL (ref 28–217)
EKG ATRIAL RATE: 97 BPM
EKG P AXIS: 87 DEGREES
EKG P-R INTERVAL: 126 MS
EKG Q-T INTERVAL: 362 MS
EKG QRS DURATION: 80 MS
EKG QTC CALCULATION (BAZETT): 459 MS
EKG R AXIS: 214 DEGREES
EKG T AXIS: 37 DEGREES
EKG VENTRICULAR RATE: 97 BPM
EOSINOPHIL # BLD: 0 K/UL (ref 0–0.44)
EOSINOPHILS RELATIVE PERCENT: 0 % (ref 1–4)
EPI CELLS #/AREA URNS HPF: NORMAL /HPF (ref 0–5)
ERYTHROCYTE [DISTWIDTH] IN BLOOD BY AUTOMATED COUNT: 15.6 % (ref 11.8–14.4)
FERRITIN SERPL-MCNC: 38 NG/ML
FLUAV RNA NPH QL NAA+NON-PROBE: NOT DETECTED
FLUBV RNA NPH QL NAA+NON-PROBE: NOT DETECTED
GFR, ESTIMATED: 32 ML/MIN/1.73M2
GLOBULIN SER CALC-MCNC: 2.8 G/DL
GLUCOSE SERPL-MCNC: 185 MG/DL (ref 74–99)
GLUCOSE UR STRIP-MCNC: NEGATIVE MG/DL
HADV DNA NPH QL NAA+NON-PROBE: NOT DETECTED
HAV IGM SERPL QL IA: NONREACTIVE
HAV IGM SERPL QL IA: NONREACTIVE
HBV CORE IGM SERPL QL IA: NONREACTIVE
HBV CORE IGM SERPL QL IA: NONREACTIVE
HBV SURFACE AG SERPL QL IA: NONREACTIVE
HBV SURFACE AG SERPL QL IA: NONREACTIVE
HCOV 229E RNA NPH QL NAA+NON-PROBE: NOT DETECTED
HCOV HKU1 RNA NPH QL NAA+NON-PROBE: NOT DETECTED
HCOV NL63 RNA NPH QL NAA+NON-PROBE: NOT DETECTED
HCOV OC43 RNA NPH QL NAA+NON-PROBE: NOT DETECTED
HCT VFR BLD AUTO: 46.9 % (ref 36.3–47.1)
HCV AB SERPL QL IA: NONREACTIVE
HCV AB SERPL QL IA: NONREACTIVE
HDLC SERPL-MCNC: 49 MG/DL
HGB BLD-MCNC: 13.6 G/DL (ref 11.9–15.1)
HGB UR QL STRIP.AUTO: ABNORMAL
HMPV RNA NPH QL NAA+NON-PROBE: NOT DETECTED
HPIV1 RNA NPH QL NAA+NON-PROBE: NOT DETECTED
HPIV2 RNA NPH QL NAA+NON-PROBE: NOT DETECTED
HPIV3 RNA NPH QL NAA+NON-PROBE: NOT DETECTED
HPIV4 RNA NPH QL NAA+NON-PROBE: NOT DETECTED
IGA SERPL-MCNC: 251 MG/DL (ref 70–400)
IGG SERPL-MCNC: 920 MG/DL (ref 700–1600)
IGM SERPL-MCNC: 81 MG/DL (ref 40–230)
IMM GRANULOCYTES # BLD AUTO: 0.09 K/UL (ref 0–0.3)
IMM GRANULOCYTES NFR BLD: 1 %
IRON SATN MFR SERPL: 5 % (ref 20–55)
IRON SERPL-MCNC: 20 UG/DL (ref 37–145)
KETONES UR STRIP-MCNC: NEGATIVE MG/DL
LDH SERPL-CCNC: 796 U/L (ref 135–214)
LDLC SERPL CALC-MCNC: 48 MG/DL (ref 0–100)
LEUKOCYTE ESTERASE UR QL STRIP: NEGATIVE
LYMPHOCYTES NFR BLD: 0.44 K/UL (ref 1.1–3.7)
LYMPHOCYTES RELATIVE PERCENT: 5 % (ref 24–43)
M PNEUMO DNA NPH QL NAA+NON-PROBE: NOT DETECTED
MAGNESIUM SERPL-MCNC: 2.4 MG/DL (ref 1.6–2.4)
MCH RBC QN AUTO: 24.9 PG (ref 25.2–33.5)
MCHC RBC AUTO-ENTMCNC: 29 G/DL (ref 28.4–34.8)
MCV RBC AUTO: 85.7 FL (ref 82.6–102.9)
MONOCYTES NFR BLD: 1.06 K/UL (ref 0.1–1.2)
MONOCYTES NFR BLD: 12 % (ref 3–12)
MORPHOLOGY: ABNORMAL
NEUTROPHILS NFR BLD: 82 % (ref 36–65)
NEUTS SEG NFR BLD: 7.21 K/UL (ref 1.5–8.1)
NITRITE UR QL STRIP: NEGATIVE
NRBC BLD-RTO: 1.1 PER 100 WBC
PH UR STRIP: 6 [PH] (ref 5–8)
PLATELET # BLD AUTO: 259 K/UL (ref 138–453)
PMV BLD AUTO: 10.6 FL (ref 8.1–13.5)
POTASSIUM SERPL-SCNC: 4.1 MMOL/L (ref 3.7–5.3)
PROT SERPL-MCNC: 6.5 G/DL (ref 6.6–8.7)
PROT UR STRIP-MCNC: 300 MG/DL
RBC # BLD AUTO: 5.47 M/UL (ref 3.95–5.11)
RBC #/AREA URNS HPF: NORMAL /HPF (ref 0–4)
RSV RNA NPH QL NAA+NON-PROBE: NOT DETECTED
RV+EV RNA NPH QL NAA+NON-PROBE: NOT DETECTED
SARS-COV-2 RNA NPH QL NAA+NON-PROBE: NOT DETECTED
SODIUM SERPL-SCNC: 134 MMOL/L (ref 136–145)
SODIUM UR-SCNC: 34 MMOL/L
SP GR UR STRIP: 1.03 (ref 1–1.03)
SPECIMEN DESCRIPTION: NORMAL
TIBC SERPL-MCNC: 395 UG/DL (ref 250–450)
TOTAL PROTEIN, URINE: 325 MG/DL
TRIGL SERPL-MCNC: 55 MG/DL
TROPONIN I SERPL HS-MCNC: 259 NG/L (ref 0–14)
TSH SERPL DL<=0.05 MIU/L-ACNC: 0.6 UIU/ML (ref 0.27–4.2)
UNSATURATED IRON BINDING CAPACITY: 375 UG/DL (ref 112–347)
URINE TOTAL PROTEIN CREATININE RATIO: 7.08 (ref 0–0.2)
UROBILINOGEN UR STRIP-ACNC: 1 EU/DL (ref 0–1)
VLDLC SERPL CALC-MCNC: 11 MG/DL (ref 1–30)
WBC #/AREA URNS HPF: NORMAL /HPF (ref 0–5)
WBC OTHER # BLD: 8.8 K/UL (ref 3.5–11.3)

## 2025-06-25 PROCEDURE — 6360000002 HC RX W HCPCS: Performed by: INTERNAL MEDICINE

## 2025-06-25 PROCEDURE — 86665 EPSTEIN-BARR CAPSID VCA: CPT

## 2025-06-25 PROCEDURE — 82103 ALPHA-1-ANTITRYPSIN TOTAL: CPT

## 2025-06-25 PROCEDURE — 85520 HEPARIN ASSAY: CPT

## 2025-06-25 PROCEDURE — 76770 US EXAM ABDO BACK WALL COMP: CPT

## 2025-06-25 PROCEDURE — 2500000003 HC RX 250 WO HCPCS

## 2025-06-25 PROCEDURE — 6360000002 HC RX W HCPCS: Performed by: NURSE PRACTITIONER

## 2025-06-25 PROCEDURE — 86225 DNA ANTIBODY NATIVE: CPT

## 2025-06-25 PROCEDURE — 5A09357 ASSISTANCE WITH RESPIRATORY VENTILATION, LESS THAN 24 CONSECUTIVE HOURS, CONTINUOUS POSITIVE AIRWAY PRESSURE: ICD-10-PCS | Performed by: INTERNAL MEDICINE

## 2025-06-25 PROCEDURE — 83735 ASSAY OF MAGNESIUM: CPT

## 2025-06-25 PROCEDURE — 83540 ASSAY OF IRON: CPT

## 2025-06-25 PROCEDURE — 51702 INSERT TEMP BLADDER CATH: CPT

## 2025-06-25 PROCEDURE — 6370000000 HC RX 637 (ALT 250 FOR IP)

## 2025-06-25 PROCEDURE — 86694 HERPES SIMPLEX NES ANTBDY: CPT

## 2025-06-25 PROCEDURE — 82728 ASSAY OF FERRITIN: CPT

## 2025-06-25 PROCEDURE — 84155 ASSAY OF PROTEIN SERUM: CPT

## 2025-06-25 PROCEDURE — 80074 ACUTE HEPATITIS PANEL: CPT

## 2025-06-25 PROCEDURE — 82570 ASSAY OF URINE CREATININE: CPT

## 2025-06-25 PROCEDURE — 86334 IMMUNOFIX E-PHORESIS SERUM: CPT

## 2025-06-25 PROCEDURE — 93970 EXTREMITY STUDY: CPT

## 2025-06-25 PROCEDURE — 86645 CMV ANTIBODY IGM: CPT

## 2025-06-25 PROCEDURE — 2060000000 HC ICU INTERMEDIATE R&B

## 2025-06-25 PROCEDURE — 83550 IRON BINDING TEST: CPT

## 2025-06-25 PROCEDURE — 84165 PROTEIN E-PHORESIS SERUM: CPT

## 2025-06-25 PROCEDURE — 84443 ASSAY THYROID STIM HORMONE: CPT

## 2025-06-25 PROCEDURE — 99223 1ST HOSP IP/OBS HIGH 75: CPT | Performed by: INTERNAL MEDICINE

## 2025-06-25 PROCEDURE — 83516 IMMUNOASSAY NONANTIBODY: CPT

## 2025-06-25 PROCEDURE — 86335 IMMUNFIX E-PHORSIS/URINE/CSF: CPT

## 2025-06-25 PROCEDURE — 99223 1ST HOSP IP/OBS HIGH 75: CPT | Performed by: STUDENT IN AN ORGANIZED HEALTH CARE EDUCATION/TRAINING PROGRAM

## 2025-06-25 PROCEDURE — 99222 1ST HOSP IP/OBS MODERATE 55: CPT | Performed by: STUDENT IN AN ORGANIZED HEALTH CARE EDUCATION/TRAINING PROGRAM

## 2025-06-25 PROCEDURE — 6370000000 HC RX 637 (ALT 250 FOR IP): Performed by: STUDENT IN AN ORGANIZED HEALTH CARE EDUCATION/TRAINING PROGRAM

## 2025-06-25 PROCEDURE — 84156 ASSAY OF PROTEIN URINE: CPT

## 2025-06-25 PROCEDURE — 2580000003 HC RX 258: Performed by: NURSE PRACTITIONER

## 2025-06-25 PROCEDURE — 36415 COLL VENOUS BLD VENIPUNCTURE: CPT

## 2025-06-25 PROCEDURE — 84300 ASSAY OF URINE SODIUM: CPT

## 2025-06-25 PROCEDURE — 82784 ASSAY IGA/IGD/IGG/IGM EACH: CPT

## 2025-06-25 PROCEDURE — 94640 AIRWAY INHALATION TREATMENT: CPT

## 2025-06-25 PROCEDURE — 94761 N-INVAS EAR/PLS OXIMETRY MLT: CPT

## 2025-06-25 PROCEDURE — 80076 HEPATIC FUNCTION PANEL: CPT

## 2025-06-25 PROCEDURE — 94660 CPAP INITIATION&MGMT: CPT

## 2025-06-25 PROCEDURE — 2700000000 HC OXYGEN THERAPY PER DAY

## 2025-06-25 PROCEDURE — 74230 X-RAY XM SWLNG FUNCJ C+: CPT

## 2025-06-25 PROCEDURE — 83615 LACTATE (LD) (LDH) ENZYME: CPT

## 2025-06-25 PROCEDURE — 82390 ASSAY OF CERULOPLASMIN: CPT

## 2025-06-25 PROCEDURE — 2T015 HOSPITALIST 2ND TOUCH: CPT | Performed by: NURSE PRACTITIONER

## 2025-06-25 PROCEDURE — 5A0945A ASSISTANCE WITH RESPIRATORY VENTILATION, 24-96 CONSECUTIVE HOURS, HIGH NASAL FLOW/VELOCITY: ICD-10-PCS | Performed by: INTERNAL MEDICINE

## 2025-06-25 PROCEDURE — 76705 ECHO EXAM OF ABDOMEN: CPT

## 2025-06-25 PROCEDURE — 86038 ANTINUCLEAR ANTIBODIES: CPT

## 2025-06-25 PROCEDURE — 84484 ASSAY OF TROPONIN QUANT: CPT

## 2025-06-25 PROCEDURE — 80061 LIPID PANEL: CPT

## 2025-06-25 PROCEDURE — 81001 URINALYSIS AUTO W/SCOPE: CPT

## 2025-06-25 PROCEDURE — 80048 BASIC METABOLIC PNL TOTAL CA: CPT

## 2025-06-25 PROCEDURE — 87086 URINE CULTURE/COLONY COUNT: CPT

## 2025-06-25 PROCEDURE — 6360000002 HC RX W HCPCS

## 2025-06-25 PROCEDURE — 85025 COMPLETE CBC W/AUTO DIFF WBC: CPT

## 2025-06-25 PROCEDURE — 6370000000 HC RX 637 (ALT 250 FOR IP): Performed by: INTERNAL MEDICINE

## 2025-06-25 PROCEDURE — 82105 ALPHA-FETOPROTEIN SERUM: CPT

## 2025-06-25 PROCEDURE — 86160 COMPLEMENT ANTIGEN: CPT

## 2025-06-25 PROCEDURE — 92611 MOTION FLUOROSCOPY/SWALLOW: CPT

## 2025-06-25 PROCEDURE — 83880 ASSAY OF NATRIURETIC PEPTIDE: CPT

## 2025-06-25 RX ORDER — IPRATROPIUM BROMIDE AND ALBUTEROL SULFATE 2.5; .5 MG/3ML; MG/3ML
1 SOLUTION RESPIRATORY (INHALATION)
Status: DISCONTINUED | OUTPATIENT
Start: 2025-06-25 | End: 2025-06-26

## 2025-06-25 RX ORDER — BUDESONIDE AND FORMOTEROL FUMARATE DIHYDRATE 160; 4.5 UG/1; UG/1
2 AEROSOL RESPIRATORY (INHALATION)
Status: DISCONTINUED | OUTPATIENT
Start: 2025-06-25 | End: 2025-07-02 | Stop reason: HOSPADM

## 2025-06-25 RX ORDER — FUROSEMIDE 10 MG/ML
20 INJECTION INTRAMUSCULAR; INTRAVENOUS DAILY
Status: DISCONTINUED | OUTPATIENT
Start: 2025-06-25 | End: 2025-06-26

## 2025-06-25 RX ORDER — FUROSEMIDE 20 MG/1
20 TABLET ORAL DAILY
Status: DISCONTINUED | OUTPATIENT
Start: 2025-06-25 | End: 2025-06-27

## 2025-06-25 RX ADMIN — IPRATROPIUM BROMIDE AND ALBUTEROL SULFATE 1 DOSE: .5; 2.5 SOLUTION RESPIRATORY (INHALATION) at 15:50

## 2025-06-25 RX ADMIN — ASPIRIN 81 MG: 81 TABLET, COATED ORAL at 08:29

## 2025-06-25 RX ADMIN — METHYLPREDNISOLONE SODIUM SUCCINATE 40 MG: 40 INJECTION, POWDER, LYOPHILIZED, FOR SOLUTION INTRAMUSCULAR; INTRAVENOUS at 16:40

## 2025-06-25 RX ADMIN — METOPROLOL TARTRATE 25 MG: 25 TABLET, FILM COATED ORAL at 20:36

## 2025-06-25 RX ADMIN — SODIUM CHLORIDE, PRESERVATIVE FREE 10 ML: 5 INJECTION INTRAVENOUS at 20:36

## 2025-06-25 RX ADMIN — CLOPIDOGREL BISULFATE 75 MG: 75 TABLET, FILM COATED ORAL at 08:29

## 2025-06-25 RX ADMIN — SODIUM CHLORIDE, PRESERVATIVE FREE 10 ML: 5 INJECTION INTRAVENOUS at 08:29

## 2025-06-25 RX ADMIN — IPRATROPIUM BROMIDE AND ALBUTEROL SULFATE 1 DOSE: .5; 2.5 SOLUTION RESPIRATORY (INHALATION) at 20:02

## 2025-06-25 RX ADMIN — IRON SUCROSE 200 MG: 20 INJECTION, SOLUTION INTRAVENOUS at 13:47

## 2025-06-25 RX ADMIN — METHYLPREDNISOLONE SODIUM SUCCINATE 40 MG: 40 INJECTION, POWDER, LYOPHILIZED, FOR SOLUTION INTRAMUSCULAR; INTRAVENOUS at 08:29

## 2025-06-25 RX ADMIN — FUROSEMIDE 20 MG: 10 INJECTION, SOLUTION INTRAMUSCULAR; INTRAVENOUS at 08:28

## 2025-06-25 RX ADMIN — BUDESONIDE AND FORMOTEROL FUMARATE DIHYDRATE 2 PUFF: 160; 4.5 AEROSOL RESPIRATORY (INHALATION) at 20:02

## 2025-06-25 RX ADMIN — WATER 1000 MG: 1 INJECTION INTRAMUSCULAR; INTRAVENOUS; SUBCUTANEOUS at 13:35

## 2025-06-25 ASSESSMENT — PAIN SCALES - GENERAL: PAINLEVEL_OUTOF10: 0

## 2025-06-25 NOTE — CONSULTS
[facility]    Consult / H&P               Today's Date: 6/25/2025  Patient Name: Sunita Watts  Date of admission: 6/24/2025 10:16 PM  Patient's age: 70 y.o., 1955  Admission Dx: Acute on chronic respiratory failure (HCC) [J96.20]  NSTEMI (non-ST elevated myocardial infarction) ([facility]) [I21.4]  ALPA (acute kidney injury) [N17.9]  PE (pulmonary thromboembolism) ([facility]) [I26.99]    Requesting Physician: Cornelius Holland MD    Cardiac Evaluation Reason: High risk unstable angina    History Obtained From: patient and chart review     History of Present Illness:    This patient 70 y.o. years old with past medical history given below.  Patient has a history of COPD, CAD status post stent. She reports she presented to the emergency room further evaluation after having progressive shortness of breath and was feeling tired.  Patient endorsed some dizziness. Otherwise denied any chest pain, palpitations, lightheadedness.    Past Medical History:   has a past medical history of COPD (chronic obstructive pulmonary disease) ([facility]) and Depression.    Past Surgical History:   has a past surgical history that includes eye surgery (Cataract); Cardiac procedure (N/A, 12/08/2023); Cardiac procedure (N/A, 12/08/2023); Cardiac catheterization (03/25/2025); Cardiac procedure (N/A, 3/25/2025); and invasive vascular (N/A, 3/25/2025).     Home Medications:    Prior to Admission medications    Medication Sig Start Date End Date Taking? Authorizing Provider   furosemide (LASIX) 20 MG tablet Take 1 tablet by mouth once daily 6/5/25  Yes Jesenia Middleton APRN - CNP   metoprolol tartrate (LOPRESSOR) 25 MG tablet Take 1 tablet by mouth 2 times daily 5/30/25  Yes Jesenia Middleton APRN - CNP   clopidogrel (PLAVIX) 75 MG tablet Take 1 tablet by mouth daily 3/24/25  Yes Jesenia Middleton APRN - CNP   rosuvastatin (CRESTOR) 20 MG tablet Take 1 tablet by mouth daily 3/13/25  Yes Yoly Coleman APRN - CNP

## 2025-06-25 NOTE — PROGRESS NOTES
[facility]  Office: [phone]  Aleksandr Frias, DO, Nain Gooden, DO, Papo Hurst DO, Benji Howard, DO, Sandro Contreras MD, Shaila Gray MD, Prince Lewis MD, Nini Song MD,  Rao Khalil MD, Victor Manuel Herman MD, Cornelius Holland MD,  Erasto Lezama DO, Chela Slaughter MD, Alonzo Go MD, Sean Frias DO, Lolly Gomes MD,  Phillip Scanlon DO, Macey Perkins MD, Filomena Gonzalez MD, Morales Monzon MD,  Adolfo Garza MD, Nelly Bush MD, Danielle Dee MD, Lamine Ramos MD, Conor Rivas MD, Abhijeet Harkins MD, Eddy Soto, DO, Komal Samuels MD, Justin Cooper DO, Yuan Foster MD, Erasto Shin MD, Mohsin Reza, MD, Dionicio Garg MD, Shirley Waterhouse, CNP,  Sole Grimes, CNP, Eddy Reyes, CNP,  Ksenia Thrasher, DNP, Soraya Mckeon, CNP, Sabrina Grace, CNP, Aniya Macdonald, CNP, Bridgette Cohen, CNP, Yamilex Spence, PA-C, Olinda Loredo, CNP, Shavon Mina, CNP,  Xiomara Basurto, CNP, Tayler Rosario, CNP, Shon Anand, PA-C, Cortney Wellington, PA-C, Federica Maza, CNP,        Demetra Bloom, CNS, Yoly Coleman, CNP, Jenny Fernandez, CNP         [facility]   IN-PATIENT SERVICE   [facility]    Second Visit Note  For more detailed information please refer to the progress note of the day      6/25/2025    10:44 AM    Name:   Sunita Watts  MRN:     [id number]     Acct:      [id number]   Room:   2005/2005-01  IP Day:  1  Admit Date:  6/24/2025 10:16 PM    PCP:   Kita Newton, APRN - CNP  Code Status:  Full Code      Pt vitals were reviewed   New labs were reviewed   Patient was seen, no further episodes of nausea or emesis.  States her shortness of breath is improving.  She is evaluated on high flow 45% FiO2 40 L.  Patient states no oxygen use at home, no CPAP use at home    Updated plan :     Acute on chronic hypoxic respiratory failure: Continue to wean high flow O2 if patient condition tolerates.  CTA chest reviewed demonstrating

## 2025-06-25 NOTE — PLAN OF CARE
Problem: Discharge Planning  Goal: Discharge to home or other facility with appropriate resources  Outcome: Progressing  Flowsheets  Taken 6/24/2025 2243  Discharge to home or other facility with appropriate resources:   Identify barriers to discharge with patient and caregiver   Identify discharge learning needs (meds, wound care, etc)   Refer to discharge planning if patient needs post-hospital services based on physician order or complex needs related to functional status, cognitive ability or social support system  Taken 6/24/2025 2230  Discharge to home or other facility with appropriate resources: Identify barriers to discharge with patient and caregiver     Problem: Safety - Adult  Goal: Free from fall injury  Outcome: Progressing     Problem: Respiratory - Adult  Goal: Achieves optimal ventilation and oxygenation  6/24/2025 2336 by Maeve Fernandez, RN  Outcome: Progressing  6/24/2025 2230 by Abena Pace RCP  Outcome: Progressing  6/24/2025 2230 by Abena Pace RCP  Outcome: Progressing  Flowsheets (Taken 6/24/2025 2230 by Maeve Fernandez, RN)  Achieves optimal ventilation and oxygenation:   Assess for changes in respiratory status   Assess for changes in mentation and behavior   Position to facilitate oxygenation and minimize respiratory effort   Oxygen supplementation based on oxygen saturation or arterial blood gases   Encourage broncho-pulmonary hygiene including cough, deep breathe, incentive spirometry   Assess the need for suctioning and aspirate as needed   Assess and instruct to report shortness of breath or any respiratory difficulty   Respiratory therapy support as indicated     Problem: ABCDS Injury Assessment  Goal: Absence of physical injury  Outcome: Progressing

## 2025-06-25 NOTE — CONSULTS
6/26/2025] azithromycin  250 mg Oral Daily    cefTRIAXone (ROCEPHIN) IV  1,000 mg IntraVENous Q24H    methylPREDNISolone  40 mg IntraVENous Q8H     Continuous Infusions:   sodium chloride      heparin (PORCINE) Infusion 14 Units/kg/hr (06/25/25 0739)     PRN Meds:albuterol, sodium chloride flush, sodium chloride, ondansetron **OR** ondansetron, acetaminophen **OR** acetaminophen, magnesium hydroxide, nitroGLYCERIN, ipratropium 0.5 mg-albuterol 2.5 mg, benzonatate, heparin (porcine), heparin (porcine)    SOCIAL HISTORY:     Tobacco:   reports that she has quit smoking. Her smoking use included cigarettes. She started smoking about 59 years ago. She has a 30.4 pack-year smoking history. She has been exposed to tobacco smoke. She has never used smokeless tobacco.  Alcohol:   reports that she does not currently use alcohol.  Illicit drugs:  reports no history of drug use.    FAMILY HISTORY:     Family History   Problem Relation Age of Onset    Unknown Mother     Unknown Father        REVIEW OF SYSTEMS:    Review of Systems   Constitutional:  Positive for activity change. Negative for appetite change, chills, diaphoresis, fatigue, fever and unexpected weight change.   HENT: Negative.     Eyes: Negative.    Respiratory:  Positive for shortness of breath.    Cardiovascular: Negative.    Gastrointestinal:  Negative for abdominal distention, abdominal pain, anal bleeding, blood in stool, constipation, diarrhea, nausea, rectal pain and vomiting.   Endocrine: Negative.    Genitourinary: Negative.    Musculoskeletal: Negative.    Skin: Negative.    Allergic/Immunologic: Negative.    Neurological: Negative.    Hematological: Negative.    Psychiatric/Behavioral: Negative.         PHYSICAL EXAM:    BP 94/62   Pulse 75   Temp 97.4 °F (36.3 °C) (Oral)   Resp 15   Ht 1.549 m (5' 1\")   Wt 57.6 kg (127 lb)   SpO2 99%   BMI 24.00 kg/m²   Physical Exam  Constitutional:       General: She is not in acute distress.     Appearance:  NONREACTIVE 06/25/2025 09:07 AM    HEPAIGM NONREACTIVE 06/25/2025 09:07 AM       HCV Genotype   No results found for: \"HCVGENOTYP\"    HCV Quantitative   No components found for: \"HEPATITISCRNAPCRQUAN\"      AFP  Lab Results   Component Value Date/Time    AFP 8.0 06/25/2025 09:07 AM       Alpha 1 antitrypsin   Lab Results   Component Value Date/Time    A1A 161 06/25/2025 09:07 AM       Anti - Liver/Kidney Ab  No results found for: \"LIVER-KIDNEYMICROSOMALAB\"    CECILIA  No results found for: \"CECILIA\"    AMA  No results found for: \"MITOAB\"    ASMA  No results found for: \"SMOOTHMUSCAB\"    Ceruloplasmin  Lab Results   Component Value Date/Time    CERULOPLSM 44 06/25/2025 09:07 AM       Celiac panel  Lab Results   Component Value Date/Time     06/25/2025 12:13 AM       IgG  Lab Results   Component Value Date/Time     06/25/2025 12:13 AM       IgM  Lab Results   Component Value Date/Time    IGM 81 06/25/2025 12:13 AM       GGT   No results found for: \"GGT\"    PT/INR  Recent Labs     06/24/25  1354 06/24/25  2251   PROTIME 17.0* 19.8*   INR 1.4 1.6       Cancer Markers:  CEA:  No results found for: \"CEA\"  Ca 125:  No results found for: \"\"  Ca 19-9:   No results found for: \"\"  AFP:   Lab Results   Component Value Date/Time    AFP 8.0 06/25/2025 09:07 AM       Lactic acid:No results for input(s): \"LACTACIDWB\" in the last 72 hours.        IMAGING  CT CHEST PULMONARY EMBOLISM W CONTRAST  Result Date: 6/24/2025  EXAMINATION: CTA OF THE CHEST; CT OF THE ABDOMEN AND PELVIS WITH CONTRAST 6/24/2025 2:44 pm; 6/24/2025 2:45 pm TECHNIQUE: CTA of the chest was performed after the administration of intravenous contrast.  Multiplanar reformatted images are provided for review.  MIP images are provided for review. Automated exposure control, iterative reconstruction, and/or weight based adjustment of the mA/kV was utilized to reduce the radiation dose to as low as reasonably achievable.; CT of the abdomen and pelvis was

## 2025-06-25 NOTE — CONSULTS
Yoly Coleman, APRN - CNP   albuterol (PROVENTIL) (2.5 MG/3ML) 0.083% nebulizer solution Take 3 mLs by nebulization every 6 hours as needed for Wheezing 24  Yes Polina Foley, APRN - CNP     IMMUNIZATIONS:  Most Recent Immunizations   Administered Date(s) Administered    COVID-19, MODERNA BLUE border, Primary or Immunocompromised, (age 12y+), IM, 100 mcg/0.5mL 2022    COVID-19, MODERNA Booster BLUE border, (age 18y+), IM, 50mcg/0.25mL 2022    Influenza, FLUAD, (age 65 y+), IM, Quadv, 0.5mL 2023    Influenza, FLUAD, (age 65 y+), IM, Trivalent PF, 0.5mL 10/28/2024    Influenza, FLUBLOK, (age 18 y+), Quadv PF, 0.5mL 10/15/2020    Influenza, FLUZONE High Dose, (age 65 y+), IM, Trivalent PF, 0.5mL 2019    Pneumococcal, PCV-13, PREVNAR 13, (age 6w+), IM, 0.5mL 2019    Pneumococcal, PCV20, PREVNAR 20, (age 6w+), IM, 0.5mL 2023       REVIEW OF SYSTEMS:  General: negative for chills, fatigue or fever  ENT: negative for headaches, nasal congestion, sore throat or visual changes  Allergy and Immunology: negative for postnasal drip or seasonal allergies  Hematological and Lymphatic: negative for bleeding problems, swollen lymph nodes  Respiratory: positive for shortness of breath, orthopnea negative for cough, pleuritic pain, sputum changes, or wheezing  Cardiovascular: negative for edema or palpitations  Gastrointestinal: negative for abdominal pain, change in bowel habits or nausea/vomiting  Genito-Urinary: negative for dysuria or urinary frequency/urgency  Musculoskeletal: negative for joint pain or joint swelling  Neurological: negative for numbness/tingling, seizures or weakness  Dermatological: negative for pruritus or rash    PHYSICAL EXAMINATION     VITAL SIGNS:   LAST-  /62   Pulse 76   Temp 97.4 °F (36.3 °C) (Oral)   Resp 15   Ht 1.549 m (5' 1\")   Wt 57.6 kg (127 lb)   SpO2 99%   BMI 24.00 kg/m²   8-24 HR RANGE-  TEMP Temp  Av °F (36.7 °C)  Min: 97.4   --    CULTURE NO GROWTH 19 HOURS  --      Pathology:    Radiology Reports:  Vascular duplex lower extremity venous bilateral         FL MODIFIED BARIUM SWALLOW W VIDEO    (Results Pending)   US ABDOMEN LIMITED W DOPPLER    (Results Pending)   US RENAL COMPLETE    (Results Pending)       Pulmonary Function test:    Polysomnogram:    Echocardiogram:   No results found for this or any previous visit.      Cardiac Catheterization:   No results found for this or any previous visit.      ASSESSMENT AND PLAN     Assessment:    Acute hypoxic hypercapnic respiratory failure.  Likely chronic hypoxic hypercapnic respiratory failure.  Minimal scattered subsegmental lower pulmonary embolism unlikely the cause of her acute decompensation  Severe pulmonary hypertension group group 3/group 2.  Less likely but possible CTEPH  COPD severity is not known.  Possible COPD exacerbation  Acute on chronic diastolic heart failure.  Coronary artery disease with history of PCI.  Transaminitis.  Elevated troponin.  ALPA.  Tobacco dependence.    Plan:    I personally interviewed/examined the patient; reviewed interval history, interpreted all available radiographic and laboratory data at the time of service.    I do not think that minimal scattered subsubsegmental pulm embolism would cause her acute symptom decompensation.  She will likely have chronic hypoxia hypercapnia with COPD and diastolic dysfunction exacerbated by pulm embolism.  She will need right heart catheterization and to determine mean PAP, vasoreactivity, PVR, PCWP, PVR I once more stabilized  Patient is currently needing high flow oxygen when I saw the patient she was on high flow 40 L 45% she was saturating 94%.  Wean O2 to keep saturation 90% and above.  Ultimately transition to nasal cannula.  Patient remains hemodynamically stable and is currently saturating above 92% on high flow nasal cannula.  Continue with anticoagulation on heparin drip  Recommend nocturnal BiPAP/NIV

## 2025-06-25 NOTE — PLAN OF CARE
[facility]  Office: [phone]  Aleksandr Frias, DO, Nain Gooden, DO, Papo Hurst DO, Benji Howard, DO, Sandro Contreras MD, Shaila Gray MD, Prince Lewis MD, Nini Song MD,  Rao Khalil MD, Victor Manuel Herman MD, Cornelius Holland MD,  Erasto Lezama DO, Chela Slaughter MD, Alonzo Go MD, Sean Frias DO, Lolly Gomes MD,  Phillip Scanlon DO, Macey Perkins MD, Filomena Gonzalez MD, Morales Monzon MD,  Adolfo Garza MD, Nelly Bush MD, Danielle Dee MD, Lamine Ramos MD, Conor Rivas MD, Abhijeet Harkins MD, Eddy Soto, DO, Komal Samuels MD, Justin Cooper DO, Yuan Foster MD, Erasto Shin MD, Mohsin Reza, MD, Dinoicio Garg MD, Shirley Waterhouse, CNP,  Sole Grimes, CNP, Eddy Reyes, CNP,  Ksenia Thrasher, DNP, Soraya Mckeon, CNP, Sabrina Grace, CNP, Aniya Macdonald, CNP, Bridgette Cohen, CNP, Yamilex Spence, PA-C, Olinda Loredo, CNP, Shavon Mina, CNP,  Xiomara Basurto, CNP, Tayler Rosario, CNP, Shon Anand, PA-C, Cortney Wellington, PA-C, Federica Maza, CNP,        Demetra Bloom, CNS, Yoly Coleman, CNP, Jenny Fernandez, CNP         [facility]   IN-PATIENT SERVICE   [facility]    Second Visit Note  For more detailed information please refer to the progress note of the day      6/25/2025    1:41 PM    Name:   Sunita Watts  MRN:     [id number]     Acct:      [id number]   Room:   2005/2005-01  IP Day:  1  Admit Date:  6/24/2025 10:16 PM    PCP:   Kita Newton, APRN - CNP  Code Status:  Full Code      Pt vitals were reviewed   New labs were reviewed   Patient was seen    Updated plan :     Changed to 6LL nasal cannula seems to be tolerating well. If fails will switch back to high flow  Continue diuresis with IV lasix  Hold plavix in case of any surgical intervention needed  Continue solumedrol      Cornelius Holland MD  6/25/2025  1:41 PM

## 2025-06-25 NOTE — ED PROVIDER NOTES
[facility] Emergency Department  [address] [facility] [address]  Phone: [phone]        ADDENDUM:      Care of this patient was assumed from Dr. Caceres.  The patient was seen for Shortness of Breath (Pt presents c/o SOB and nausea for 2 days)  .  The patient's initial evaluation and plan have been discussed with the prior provider who initially evaluated the patient.  Nursing Notes, Past Medical Hx, Past Surgical Hx, Allergies, were all reviewed.    PAST MEDICAL HISTORY    has a past medical history of COPD (chronic obstructive pulmonary disease) (HCC) and Depression.    SURGICAL HISTORY      has a past surgical history that includes eye surgery (Cataract); Cardiac procedure (N/A, 12/08/2023); Cardiac procedure (N/A, 12/08/2023); Cardiac catheterization (03/25/2025); Cardiac procedure (N/A, 3/25/2025); and invasive vascular (N/A, 3/25/2025).    CURRENT MEDICATIONS       Previous Medications    ALBUTEROL (PROVENTIL) (2.5 MG/3ML) 0.083% NEBULIZER SOLUTION    Take 3 mLs by nebulization every 6 hours as needed for Wheezing    ASPIRIN 81 MG EC TABLET    Take 1 tablet by mouth daily    CLOPIDOGREL (PLAVIX) 75 MG TABLET    Take 1 tablet by mouth daily    FUROSEMIDE (LASIX) 20 MG TABLET    Take 1 tablet by mouth once daily    METOPROLOL TARTRATE (LOPRESSOR) 25 MG TABLET    Take 1 tablet by mouth 2 times daily    ROSUVASTATIN (CRESTOR) 20 MG TABLET    Take 1 tablet by mouth daily       ALLERGIES     has no known allergies.      Diagnostic Results     LABS:   Results for orders placed or performed during the hospital encounter of 06/24/25   Culture, Blood 1    Specimen: Blood   Result Value Ref Range    Specimen Description .BLOOD right hand 10ml     Special Requests          Culture PENDING    Culture, Blood 1    Specimen: Blood   Result Value Ref Range    Specimen Description .BLOOD right ac 10ml     Special Requests          Culture PENDING    RSV Detection    Specimen: Nasopharyngeal Swab   Result 
normal.      Nose: Nose normal.      Mouth/Throat:      Mouth: Mucous membranes are moist.   Eyes:      Extraocular Movements: Extraocular movements intact.      Conjunctiva/sclera: Conjunctivae normal.   Cardiovascular:      Rate and Rhythm: Regular rhythm. Tachycardia present.      Heart sounds: Normal heart sounds.   Pulmonary:      Effort: Tachypnea, accessory muscle usage, prolonged expiration and retractions present. No respiratory distress.      Breath sounds: Decreased air movement present. Decreased breath sounds present. No wheezing, rhonchi or rales.   Abdominal:      General: There is no distension.      Palpations: Abdomen is soft. There is no pulsatile mass.      Tenderness: There is no abdominal tenderness. There is no guarding or rebound.   Musculoskeletal:         General: Normal range of motion.      Cervical back: Normal range of motion.      Right lower leg: No edema.      Left lower leg: No edema.   Skin:     General: Skin is warm and dry.      Coloration: Skin is not pale.   Neurological:      General: No focal deficit present.      Mental Status: She is alert.   Psychiatric:         Behavior: Behavior normal.         DIFFERENTIAL  DIAGNOSIS   PLAN (LABS / IMAGING / EKG):  Orders Placed This Encounter   Procedures    Culture, Blood 1    Culture, Blood 1    RSV Detection    COVID-19, Rapid    RAPID INFLUENZA A/B ANTIGENS    CT CHEST PULMONARY EMBOLISM W CONTRAST    CT ABDOMEN PELVIS W IV CONTRAST Additional Contrast? None    CBC with Auto Differential    Comprehensive Metabolic Panel    Troponin    Brain Natriuretic Peptide    D-Dimer, Quantitative    Lactic Acid    Urinalysis with Microscopic    Lipase    Magnesium    Mononucleosis Screen    APTT    Protime-INR    APTT    Telemetry monitoring - Continuous    Heated/ Humidified High Flow Nasal Cannula    Venous Blood Gas, POC    EKG 12 Lead    Insert peripheral IV       MEDICATIONS ORDERED:  Orders Placed This Encounter   Medications

## 2025-06-25 NOTE — PLAN OF CARE
Problem: Discharge Planning  Goal: Discharge to home or other facility with appropriate resources  6/25/2025 1101 by Courtney Olivas RN  Outcome: Progressing  6/24/2025 2336 by Maeve Fernandez RN  Outcome: Progressing  Flowsheets  Taken 6/24/2025 2243  Discharge to home or other facility with appropriate resources:   Identify barriers to discharge with patient and caregiver   Identify discharge learning needs (meds, wound care, etc)   Refer to discharge planning if patient needs post-hospital services based on physician order or complex needs related to functional status, cognitive ability or social support system  Taken 6/24/2025 2230  Discharge to home or other facility with appropriate resources: Identify barriers to discharge with patient and caregiver     Problem: Safety - Adult  Goal: Free from fall injury  6/25/2025 1101 by Courtney Olivas RN  Outcome: Progressing  6/24/2025 2336 by Maeev Fernandez RN  Outcome: Progressing     Problem: Respiratory - Adult  Goal: Achieves optimal ventilation and oxygenation  6/25/2025 1101 by Courtney Olivas RN  Outcome: Progressing  6/24/2025 2336 by Maeve Fernandez RN  Outcome: Progressing  6/24/2025 2230 by Abena Pace RCP  Outcome: Progressing  6/24/2025 2230 by Abena Pace RCP  Outcome: Progressing  Flowsheets (Taken 6/24/2025 2230 by Maeve Fernandez RN)  Achieves optimal ventilation and oxygenation:   Assess for changes in respiratory status   Assess for changes in mentation and behavior   Position to facilitate oxygenation and minimize respiratory effort   Oxygen supplementation based on oxygen saturation or arterial blood gases   Encourage broncho-pulmonary hygiene including cough, deep breathe, incentive spirometry   Assess the need for suctioning and aspirate as needed   Assess and instruct to report shortness of breath or any respiratory difficulty   Respiratory therapy support as indicated     Problem: ABCDS Injury Assessment  Goal:

## 2025-06-25 NOTE — H&P
(H) 0.00 - 0.20   Sodium, urine, random    Collection Time: 06/25/25  1:24 AM   Result Value Ref Range    Sodium, Ur 34 mmol/L   Microscopic Urinalysis    Collection Time: 06/25/25  1:24 AM   Result Value Ref Range    WBC, UA 20 TO 50 0 - 5 /HPF    RBC, UA 5 TO 10 0 - 4 /HPF    Casts UA  0 - 8 /LPF     50  Reference range defined for non-centrifuged specimen.    Epithelial Cells, UA 50  0 - 5 /HPF    Bacteria, UA None None   Basic Metabolic Panel w/ Reflex to MG    Collection Time: 06/25/25  6:01 AM   Result Value Ref Range    Sodium 134 (L) 136 - 145 mmol/L    Potassium 4.1 3.7 - 5.3 mmol/L    Chloride 92 (L) 98 - 107 mmol/L    CO2 28 20 - 31 mmol/L    Anion Gap 14 9 - 16 mmol/L    Glucose 185 (H) 74 - 99 mg/dL    BUN 44 (H) 8 - 23 mg/dL    Creatinine 1.7 (H) 0.6 - 0.9 mg/dL    Est, Glom Filt Rate 32 (L) >60 mL/min/1.73m2    Calcium 8.1 (L) 8.6 - 10.4 mg/dL   Magnesium    Collection Time: 06/25/25  6:01 AM   Result Value Ref Range    Magnesium 2.4 1.6 - 2.4 mg/dL   Lipid Panel    Collection Time: 06/25/25  6:01 AM   Result Value Ref Range    Cholesterol, Total 108 0 - 199 mg/dL    HDL 49 >40 mg/dL    LDL Cholesterol 48 0 - 100 mg/dL    Chol/HDL Ratio 2.2 <5.0    Triglycerides 55 <150 mg/dL    VLDL 11 1 - 30 mg/dL   Brain natriuretic peptide    Collection Time: 06/25/25  6:01 AM   Result Value Ref Range    NT Pro-BNP 10,319 (H) 0 - 125 pg/mL   CBC with Auto Differential    Collection Time: 06/25/25  6:01 AM   Result Value Ref Range    WBC 8.8 3.5 - 11.3 k/uL    RBC 5.47 (H) 3.95 - 5.11 m/uL    Hemoglobin 13.6 11.9 - 15.1 g/dL    Hematocrit 46.9 36.3 - 47.1 %    MCV 85.7 82.6 - 102.9 fL    MCH 24.9 (L) 25.2 - 33.5 pg    MCHC 29.0 28.4 - 34.8 g/dL    RDW 15.6 (H) 11.8 - 14.4 %    Platelets 259 138 - 453 k/uL    MPV 10.6 8.1 - 13.5 fL    NRBC Automated 1.1 (H) 0.0 per 100 WBC    Neutrophils % PENDING %    Lymphocytes % PENDING %    Monocytes % PENDING %    Eosinophils % PENDING %    Basophils % PENDING %     Immature Granulocytes % PENDING 0 %    Neutrophils Absolute PENDING k/uL    Lymphocytes Absolute PENDING k/uL    Monocytes Absolute PENDING k/uL    Eosinophils Absolute PENDING k/uL    Basophils Absolute PENDING 0.0 - 0.2 k/uL    Immature Granulocytes Absolute PENDING 0.00 - 0.30 k/uL   Hepatic Function Panel    Collection Time: 06/25/25  6:01 AM   Result Value Ref Range    Albumin PENDING g/dL    Alkaline Phosphatase PENDING U/L    ALT PENDING U/L    AST PENDING U/L    Total Bilirubin 1.9 (H) 0.0 - 1.2 mg/dL    Bilirubin, Direct 1.3 (H) 0.0 - 0.2 mg/dL    Bilirubin, Indirect 0.6 0.0 - 1.0 mg/dL    Total Protein 6.5 (L) 6.6 - 8.7 g/dL    Globulin 2.8 g/dL    Albumin/Globulin Ratio PENDING    Anti-Xa, Unfractionated Heparin    Collection Time: 06/25/25  6:01 AM   Result Value Ref Range    Anti-XA Unfrac Heparin 1.01 IU/L       Imaging/Diagnostics:  CT CHEST PULMONARY EMBOLISM W CONTRAST  Result Date: 6/24/2025  1. Scattered small peripheral acute pulmonary emboli.  No acute right heart strain but more dilated right atrium.  Follow-up cardiology consultation suggested. 2. No acute pulmonary process.  Mucus/debris in the trachea and scattered peripheral small bronchi.  Correlate for some degree aspiration/mucous plugging. 3. Hepatic steatosis. 4. Small amount of free fluid in the posterior pelvis. 5. Prominent uterus and adnexal structures; consider nonemergent US follow-up. 6. Scattered vertebral body and probably iliac bone lucencies, best seen L3. Findings may reflect osteopenia but metastases or myeloma in the differential.  Correlate clinically and consider MRI if relevant. 7. Additional findings, as above. Findings were discussed with EDILBERTO HERNANDEZ at 4:19 pm on 6/24/2025. RECOMMENDATIONS: As above.     CT ABDOMEN PELVIS W IV CONTRAST Additional Contrast? None  Result Date: 6/24/2025  1. Scattered small peripheral acute pulmonary emboli.  No acute right heart strain but more dilated right atrium.  Follow-up

## 2025-06-25 NOTE — PROGRESS NOTES
Patient arrived from [facility] with order for bladder scan and carson placement for greater than 380. Patient hesitant for carson placement. Provider messaged asking if they would prefer straight cath at this time as patient just arrived and believes she will be able to urinate once bedrest orders are removed and she can ambulate to restroom. Awaiting response.     Provider responded that patient is not to be ambulating while on heparin and carson needs to be placed. Will follow orders.

## 2025-06-25 NOTE — CONSULTS
[facility].  [address]  [place] [address]  Phone: [phone]    Fax: [phone]  Dr. Abhijit Lipscomb, CANDIE Mclean, CANDIE        Nephrology Consult Note    Reason for Consult: ALPA with proteinuria    Chief Complaint: Shortness of breath    History of Present Illness:              This is a 70 y.o. female with PMHx of COPD ex tobacco use, CAD, cor pulmonale, depression who presents with progressive shortness of breath and fatigue.  She has been feeling dyspneic just walking 20 feet over the last few months but this has been worsening in the last 1 to 2 weeks associated with a nonproductive cough.  She has been diagnosed with a PE and is on heparin drip.  She denies any history of NSAID use or any kidney problems.  She denies nephrolithiasis.  Denies any recent infections. No supplement/OTC medication use aside from tylenol.  Baseline creatinine is 0.6-0.7 and is up to 1.7 mg/dL on the day of the consult. UPCR is 7.08 with no prior available for comparison.  She reports being up to date on her mammogram, no abnormal findings in the past. She has not had a colonoscopy or CT chest. CT a/p with IV contrast done on 6/24 showing c/f lytic lesions especially near L3. CT chest for PE protocol showed scattered small PE.  GI has been consulted for elevated LFTs.      Past Medical History:        Diagnosis Date    COPD (chronic obstructive pulmonary disease) (HCC) 2019    Depression        Past Surgical History:        Procedure Laterality Date    CARDIAC CATHETERIZATION  03/25/2025    DR. AUGUSTIN    CARDIAC PROCEDURE N/A 12/08/2023    dixon / Left heart cath / coronary angiography performed by Geetha La MD at [facility] CARDIAC CATH LAB    CARDIAC PROCEDURE N/A 12/08/2023    Percutaneous coronary intervention performed by Geetha La MD at [facility] CARDIAC CATH LAB    CARDIAC PROCEDURE N/A 3/25/2025

## 2025-06-25 NOTE — PROGRESS NOTES
Spiritual Health History and Assessment/Progress Note  [facility]    (P) Initial Encounter,  ,  ,      Name: Sunita Watts MRN: [id number]    Age: 70 y.o.     Sex: female   Language: English   Adventist: Unknown   Acute on chronic respiratory failure (HCC)     Date: 6/25/2025            Total Time Calculated: (P) 15 min              Spiritual Assessment began in [facility]- [facility]        Referral/Consult From: (P) Rounding   Encounter Overview/Reason: (P) Initial Encounter  Service Provided For: (P) Patient and family together    Justina, Belief, Meaning:   Patient identifies as spiritual and has beliefs or practices that help with coping during difficult times  Family/Friends identify as spiritual and have beliefs or practices that help with coping during difficult times      Importance and Influence:  Patient has spiritual/personal beliefs that influence decisions regarding their health  Family/Friends have spiritual/personal beliefs that influence decisions regarding the patient's health    Community:  Patient feels well-supported. Support system includes: Spouse/Partner and Extended family  Family/Friends feel well-supported. Support system includes: Spouse/Partner and Extended family    Assessment and Plan of Care:      visited rounding. Patient was with . Patient seemed calm but expressed a little uncertainty about not knowing exactly what was wrong with her yet.  affirmed thoughts and feelings and explored spiritual coping.  offered a prayer, for which both patient and  were grateful.    Patient Interventions include: Facilitated expression of thoughts and feelings and Explored spiritual coping/struggle/distress  Family/Friends Interventions include: Facilitated expression of thoughts and feelings    Patient Plan of Care: Spiritual Care available upon further referral  Family/Friends Plan of Care: Spiritual Care available upon further

## 2025-06-26 ENCOUNTER — APPOINTMENT (OUTPATIENT)
Age: 70
DRG: 286 | End: 2025-06-26
Attending: INTERNAL MEDICINE
Payer: MEDICARE

## 2025-06-26 ENCOUNTER — HOSPITAL ENCOUNTER (OUTPATIENT)
Dept: PULMONOLOGY | Age: 70
Discharge: HOME | End: 2025-06-26
Attending: STUDENT IN AN ORGANIZED HEALTH CARE EDUCATION/TRAINING PROGRAM

## 2025-06-26 LAB
ALBUMIN SERPL-MCNC: 3.5 G/DL (ref 3.5–5.2)
ALBUMIN/GLOB SERPL: 1.3 {RATIO} (ref 1–2.5)
ALP SERPL-CCNC: 491 U/L (ref 35–104)
ALT SERPL-CCNC: 945 U/L (ref 10–35)
ANA SER QL IA: NEGATIVE
ANCA MYELOPEROXIDASE: <0.3 AU/ML (ref 0–3.5)
ANCA PROTEINASE 3: <0.7 AU/ML (ref 0–2)
ANION GAP SERPL CALCULATED.3IONS-SCNC: 15 MMOL/L (ref 9–16)
ANTI-XA UNFRAC HEPARIN: 0.65 IU/L
ANTI-XA UNFRAC HEPARIN: 0.66 IU/L
ANTI-XA UNFRAC HEPARIN: 0.81 IU/L
AST SERPL-CCNC: 681 U/L (ref 10–35)
BASOPHILS # BLD: 0 K/UL (ref 0–0.2)
BASOPHILS NFR BLD: 0 % (ref 0–2)
BILIRUB DIRECT SERPL-MCNC: 0.9 MG/DL (ref 0–0.2)
BILIRUB INDIRECT SERPL-MCNC: 0.3 MG/DL (ref 0–1)
BILIRUB SERPL-MCNC: 1.2 MG/DL (ref 0–1.2)
BUN SERPL-MCNC: 56 MG/DL (ref 8–23)
CA-I BLD-SCNC: 1.07 MMOL/L (ref 1.13–1.33)
CALCIUM SERPL-MCNC: 7.8 MG/DL (ref 8.6–10.4)
CHLORIDE SERPL-SCNC: 89 MMOL/L (ref 98–107)
CO2 SERPL-SCNC: 30 MMOL/L (ref 20–31)
CREAT SERPL-MCNC: 2.5 MG/DL (ref 0.6–0.9)
CREAT UR-MCNC: 41 MG/DL (ref 28–217)
CREAT UR-MCNC: 41 MG/DL (ref 28–217)
DSDNA IGG SER QL IA: <0.5 IU/ML
EBV VCA IGM SER-ACNC: 23 U/ML
ECHO AO ROOT DIAM: 2.7 CM
ECHO AO ROOT INDEX: 1.72 CM/M2
ECHO AV AREA PEAK VELOCITY: 2.1 CM2
ECHO AV AREA VTI: 1.9 CM2
ECHO AV AREA/BSA PEAK VELOCITY: 1.3 CM2/M2
ECHO AV AREA/BSA VTI: 1.2 CM2/M2
ECHO AV MEAN GRADIENT: 2 MMHG
ECHO AV MEAN VELOCITY: 0.7 M/S
ECHO AV PEAK GRADIENT: 6 MMHG
ECHO AV PEAK VELOCITY: 1.2 M/S
ECHO AV VELOCITY RATIO: 0.75
ECHO AV VTI: 17 CM
ECHO BSA: 1.57 M2
ECHO BSA: 1.59 M2
ECHO EST RA PRESSURE: 15 MMHG
ECHO LA AREA 2C: 8 CM2
ECHO LA AREA 4C: 6.9 CM2
ECHO LA DIAMETER INDEX: 1.66 CM/M2
ECHO LA DIAMETER: 2.6 CM
ECHO LA MAJOR AXIS: 3.7 CM
ECHO LA MINOR AXIS: 2.9 CM
ECHO LA TO AORTIC ROOT RATIO: 0.96
ECHO LA VOL MOD A2C: 15 ML (ref 22–52)
ECHO LA VOL MOD A4C: 9 ML (ref 22–52)
ECHO LA VOLUME INDEX MOD A2C: 10 ML/M2 (ref 16–34)
ECHO LA VOLUME INDEX MOD A4C: 6 ML/M2 (ref 16–34)
ECHO LV E' LATERAL VELOCITY: 6.64 CM/S
ECHO LV E' SEPTAL VELOCITY: 4.13 CM/S
ECHO LV EF PHYSICIAN: 55 %
ECHO LV FRACTIONAL SHORTENING: 24 % (ref 28–44)
ECHO LV INTERNAL DIMENSION DIASTOLE INDEX: 2.42 CM/M2
ECHO LV INTERNAL DIMENSION DIASTOLIC: 3.8 CM (ref 3.9–5.3)
ECHO LV INTERNAL DIMENSION SYSTOLIC INDEX: 1.85 CM/M2
ECHO LV INTERNAL DIMENSION SYSTOLIC: 2.9 CM
ECHO LV IVSD: 0.8 CM (ref 0.6–0.9)
ECHO LV MASS 2D: 78.8 G (ref 67–162)
ECHO LV MASS INDEX 2D: 50.2 G/M2 (ref 43–95)
ECHO LV POSTERIOR WALL DIASTOLIC: 0.7 CM (ref 0.6–0.9)
ECHO LV RELATIVE WALL THICKNESS RATIO: 0.37
ECHO LVOT AREA: 2.8 CM2
ECHO LVOT AV VTI INDEX: 0.68
ECHO LVOT DIAM: 1.9 CM
ECHO LVOT MEAN GRADIENT: 1 MMHG
ECHO LVOT PEAK GRADIENT: 3 MMHG
ECHO LVOT PEAK VELOCITY: 0.9 M/S
ECHO LVOT STROKE VOLUME INDEX: 20.8 ML/M2
ECHO LVOT SV: 32.6 ML
ECHO LVOT VTI: 11.5 CM
ECHO MV A VELOCITY: 0.96 M/S
ECHO MV AREA VTI: 1.3 CM2
ECHO MV E DECELERATION TIME (DT): 154 MS
ECHO MV E VELOCITY: 0.6 M/S
ECHO MV E/A RATIO: 0.63
ECHO MV E/E' LATERAL: 9.04
ECHO MV E/E' RATIO (AVERAGED): 11.78
ECHO MV E/E' SEPTAL: 14.53
ECHO MV LVOT VTI INDEX: 2.1
ECHO MV MAX VELOCITY: 1.1 M/S
ECHO MV MEAN GRADIENT: 2 MMHG
ECHO MV MEAN VELOCITY: 0.6 M/S
ECHO MV PEAK GRADIENT: 5 MMHG
ECHO MV VTI: 24.2 CM
ECHO PV MAX VELOCITY: 0.7 M/S
ECHO PV PEAK GRADIENT: 2 MMHG
ECHO RIGHT VENTRICULAR SYSTOLIC PRESSURE (RVSP): 87 MMHG
ECHO RV BASAL DIMENSION: 6 CM
ECHO TV REGURGITANT MAX VELOCITY: 4.23 M/S
ECHO TV REGURGITANT PEAK GRADIENT: 72 MMHG
EOSINOPHIL # BLD: 0 K/UL (ref 0–0.4)
EOSINOPHILS RELATIVE PERCENT: 0 % (ref 1–4)
ERYTHROCYTE [DISTWIDTH] IN BLOOD BY AUTOMATED COUNT: 15.7 % (ref 11.8–14.4)
GBM AB SER-ACNC: <1.9 AU/ML (ref 0–7)
GFR, ESTIMATED: 20 ML/MIN/1.73M2
GLOBULIN SER CALC-MCNC: 2.7 G/DL
GLUCOSE SERPL-MCNC: 151 MG/DL (ref 74–99)
HCT VFR BLD AUTO: 44 % (ref 36.3–47.1)
HGB BLD-MCNC: 13 G/DL (ref 11.9–15.1)
HIV 1+2 AB+HIV1 P24 AG SERPL QL IA: NONREACTIVE
HSV1+2 IGM SER QL IA: 0.92
IMM GRANULOCYTES # BLD AUTO: 0 K/UL (ref 0–0.3)
IMM GRANULOCYTES NFR BLD: 0 %
LYMPHOCYTES NFR BLD: 0.23 K/UL (ref 1–4.8)
LYMPHOCYTES RELATIVE PERCENT: 2 % (ref 24–44)
MCH RBC QN AUTO: 24.9 PG (ref 25.2–33.5)
MCHC RBC AUTO-ENTMCNC: 29.5 G/DL (ref 28.4–34.8)
MCV RBC AUTO: 84.3 FL (ref 82.6–102.9)
MICROALBUMIN UR-MCNC: 551 MG/L (ref 0–20)
MICROALBUMIN/CREAT UR-RTO: 1344 MCG/MG CREAT (ref 0–25)
MICROORGANISM SPEC CULT: NO GROWTH
MITOCHONDRIA M2 IGG SER-ACNC: 0.8 U/ML (ref 0–4)
MONOCYTES NFR BLD: 0.58 K/UL (ref 0.1–0.8)
MONOCYTES NFR BLD: 5 % (ref 1–7)
MORPHOLOGY: ABNORMAL
NEUTROPHILS NFR BLD: 93 % (ref 36–66)
NEUTS SEG NFR BLD: 10.79 K/UL (ref 1.8–7.7)
NRBC BLD-RTO: 1.4 PER 100 WBC
NUCLEAR IGG SER IA-RTO: <0.1 U/ML
NUCLEATED RED BLOOD CELLS: 3 PER 100 WBC
PLATELET # BLD AUTO: 232 K/UL (ref 138–453)
PMV BLD AUTO: 11 FL (ref 8.1–13.5)
POTASSIUM SERPL-SCNC: 3.6 MMOL/L (ref 3.7–5.3)
PROT SERPL-MCNC: 6.2 G/DL (ref 6.6–8.7)
RBC # BLD AUTO: 5.22 M/UL (ref 3.95–5.11)
SODIUM SERPL-SCNC: 134 MMOL/L (ref 136–145)
SPECIMEN DESCRIPTION: NORMAL
TOTAL PROTEIN, URINE: 143 MG/DL
URINE TOTAL PROTEIN CREATININE RATIO: 3.49 (ref 0–0.2)
WBC OTHER # BLD: 11.6 K/UL (ref 3.5–11.3)

## 2025-06-26 PROCEDURE — 6370000000 HC RX 637 (ALT 250 FOR IP): Performed by: STUDENT IN AN ORGANIZED HEALTH CARE EDUCATION/TRAINING PROGRAM

## 2025-06-26 PROCEDURE — 82330 ASSAY OF CALCIUM: CPT

## 2025-06-26 PROCEDURE — 87389 HIV-1 AG W/HIV-1&-2 AB AG IA: CPT

## 2025-06-26 PROCEDURE — 6360000002 HC RX W HCPCS: Performed by: NURSE PRACTITIONER

## 2025-06-26 PROCEDURE — 93970 EXTREMITY STUDY: CPT | Performed by: STUDENT IN AN ORGANIZED HEALTH CARE EDUCATION/TRAINING PROGRAM

## 2025-06-26 PROCEDURE — 82043 UR ALBUMIN QUANTITATIVE: CPT

## 2025-06-26 PROCEDURE — 99232 SBSQ HOSP IP/OBS MODERATE 35: CPT | Performed by: STUDENT IN AN ORGANIZED HEALTH CARE EDUCATION/TRAINING PROGRAM

## 2025-06-26 PROCEDURE — 6360000002 HC RX W HCPCS: Performed by: INTERNAL MEDICINE

## 2025-06-26 PROCEDURE — 99233 SBSQ HOSP IP/OBS HIGH 50: CPT | Performed by: NURSE PRACTITIONER

## 2025-06-26 PROCEDURE — 92611 MOTION FLUOROSCOPY/SWALLOW: CPT

## 2025-06-26 PROCEDURE — 6360000002 HC RX W HCPCS

## 2025-06-26 PROCEDURE — 86255 FLUORESCENT ANTIBODY SCREEN: CPT

## 2025-06-26 PROCEDURE — 99233 SBSQ HOSP IP/OBS HIGH 50: CPT | Performed by: INTERNAL MEDICINE

## 2025-06-26 PROCEDURE — 6370000000 HC RX 637 (ALT 250 FOR IP): Performed by: INTERNAL MEDICINE

## 2025-06-26 PROCEDURE — 93306 TTE W/DOPPLER COMPLETE: CPT

## 2025-06-26 PROCEDURE — 6370000000 HC RX 637 (ALT 250 FOR IP): Performed by: NURSE PRACTITIONER

## 2025-06-26 PROCEDURE — 36415 COLL VENOUS BLD VENIPUNCTURE: CPT

## 2025-06-26 PROCEDURE — 92526 ORAL FUNCTION THERAPY: CPT

## 2025-06-26 PROCEDURE — 2580000003 HC RX 258: Performed by: NURSE PRACTITIONER

## 2025-06-26 PROCEDURE — 99232 SBSQ HOSP IP/OBS MODERATE 35: CPT | Performed by: NURSE PRACTITIONER

## 2025-06-26 PROCEDURE — 82570 ASSAY OF URINE CREATININE: CPT

## 2025-06-26 PROCEDURE — 80048 BASIC METABOLIC PNL TOTAL CA: CPT

## 2025-06-26 PROCEDURE — 85520 HEPARIN ASSAY: CPT

## 2025-06-26 PROCEDURE — 84156 ASSAY OF PROTEIN URINE: CPT

## 2025-06-26 PROCEDURE — 85025 COMPLETE CBC W/AUTO DIFF WBC: CPT

## 2025-06-26 PROCEDURE — 94640 AIRWAY INHALATION TREATMENT: CPT

## 2025-06-26 PROCEDURE — 80076 HEPATIC FUNCTION PANEL: CPT

## 2025-06-26 PROCEDURE — 2060000000 HC ICU INTERMEDIATE R&B

## 2025-06-26 PROCEDURE — 93306 TTE W/DOPPLER COMPLETE: CPT | Performed by: INTERNAL MEDICINE

## 2025-06-26 PROCEDURE — 2500000003 HC RX 250 WO HCPCS

## 2025-06-26 PROCEDURE — 2700000000 HC OXYGEN THERAPY PER DAY

## 2025-06-26 PROCEDURE — 94660 CPAP INITIATION&MGMT: CPT

## 2025-06-26 PROCEDURE — 6370000000 HC RX 637 (ALT 250 FOR IP)

## 2025-06-26 RX ORDER — CALCIUM GLUCONATE 20 MG/ML
2000 INJECTION, SOLUTION INTRAVENOUS ONCE
Status: COMPLETED | OUTPATIENT
Start: 2025-06-26 | End: 2025-06-26

## 2025-06-26 RX ORDER — POTASSIUM CHLORIDE 1500 MG/1
40 TABLET, EXTENDED RELEASE ORAL ONCE
Status: COMPLETED | OUTPATIENT
Start: 2025-06-26 | End: 2025-06-26

## 2025-06-26 RX ORDER — POTASSIUM CHLORIDE 1500 MG/1
40 TABLET, EXTENDED RELEASE ORAL DAILY
Status: DISPENSED | OUTPATIENT
Start: 2025-06-26 | End: 2025-06-29

## 2025-06-26 RX ORDER — IPRATROPIUM BROMIDE AND ALBUTEROL SULFATE 2.5; .5 MG/3ML; MG/3ML
1 SOLUTION RESPIRATORY (INHALATION)
Status: DISCONTINUED | OUTPATIENT
Start: 2025-06-27 | End: 2025-07-02 | Stop reason: HOSPADM

## 2025-06-26 RX ADMIN — SODIUM CHLORIDE, PRESERVATIVE FREE 10 ML: 5 INJECTION INTRAVENOUS at 08:28

## 2025-06-26 RX ADMIN — METHYLPREDNISOLONE SODIUM SUCCINATE 40 MG: 40 INJECTION, POWDER, LYOPHILIZED, FOR SOLUTION INTRAMUSCULAR; INTRAVENOUS at 16:32

## 2025-06-26 RX ADMIN — BUDESONIDE AND FORMOTEROL FUMARATE DIHYDRATE 2 PUFF: 160; 4.5 AEROSOL RESPIRATORY (INHALATION) at 07:47

## 2025-06-26 RX ADMIN — BUDESONIDE AND FORMOTEROL FUMARATE DIHYDRATE 2 PUFF: 160; 4.5 AEROSOL RESPIRATORY (INHALATION) at 19:46

## 2025-06-26 RX ADMIN — METHYLPREDNISOLONE SODIUM SUCCINATE 40 MG: 40 INJECTION, POWDER, LYOPHILIZED, FOR SOLUTION INTRAMUSCULAR; INTRAVENOUS at 08:28

## 2025-06-26 RX ADMIN — HEPARIN SODIUM 11 UNITS/KG/HR: 10000 INJECTION, SOLUTION INTRAVENOUS at 08:37

## 2025-06-26 RX ADMIN — SODIUM CHLORIDE, PRESERVATIVE FREE 10 ML: 5 INJECTION INTRAVENOUS at 19:59

## 2025-06-26 RX ADMIN — WATER 1000 MG: 1 INJECTION INTRAMUSCULAR; INTRAVENOUS; SUBCUTANEOUS at 13:35

## 2025-06-26 RX ADMIN — ALBUTEROL SULFATE 2.5 MG: 2.5 SOLUTION RESPIRATORY (INHALATION) at 19:45

## 2025-06-26 RX ADMIN — IPRATROPIUM BROMIDE AND ALBUTEROL SULFATE 1 DOSE: .5; 2.5 SOLUTION RESPIRATORY (INHALATION) at 14:42

## 2025-06-26 RX ADMIN — METHYLPREDNISOLONE SODIUM SUCCINATE 40 MG: 40 INJECTION, POWDER, LYOPHILIZED, FOR SOLUTION INTRAMUSCULAR; INTRAVENOUS at 00:36

## 2025-06-26 RX ADMIN — IRON SUCROSE 200 MG: 20 INJECTION, SOLUTION INTRAVENOUS at 13:47

## 2025-06-26 RX ADMIN — AZITHROMYCIN DIHYDRATE 250 MG: 250 TABLET ORAL at 17:37

## 2025-06-26 RX ADMIN — POTASSIUM CHLORIDE 40 MEQ: 1500 TABLET, EXTENDED RELEASE ORAL at 06:29

## 2025-06-26 RX ADMIN — ASPIRIN 81 MG: 81 TABLET, COATED ORAL at 08:28

## 2025-06-26 RX ADMIN — METOPROLOL TARTRATE 25 MG: 25 TABLET, FILM COATED ORAL at 19:58

## 2025-06-26 RX ADMIN — IPRATROPIUM BROMIDE AND ALBUTEROL SULFATE 1 DOSE: .5; 2.5 SOLUTION RESPIRATORY (INHALATION) at 07:47

## 2025-06-26 RX ADMIN — CALCIUM GLUCONATE 2000 MG: 20 INJECTION, SOLUTION INTRAVENOUS at 09:25

## 2025-06-26 RX ADMIN — METHYLPREDNISOLONE SODIUM SUCCINATE 40 MG: 40 INJECTION, POWDER, LYOPHILIZED, FOR SOLUTION INTRAMUSCULAR; INTRAVENOUS at 23:49

## 2025-06-26 RX ADMIN — FUROSEMIDE 20 MG: 10 INJECTION, SOLUTION INTRAMUSCULAR; INTRAVENOUS at 09:23

## 2025-06-26 ASSESSMENT — PAIN SCALES - GENERAL: PAINLEVEL_OUTOF10: 0

## 2025-06-26 NOTE — PLAN OF CARE
Problem: Discharge Planning  Goal: Discharge to home or other facility with appropriate resources  6/25/2025 2209 by Delfina Dillard RN  Outcome: Progressing  Flowsheets (Taken 6/25/2025 2000)  Discharge to home or other facility with appropriate resources: Identify barriers to discharge with patient and caregiver  6/25/2025 1101 by Courtney Olivas RN  Outcome: Progressing     Problem: Safety - Adult  Goal: Free from fall injury  6/25/2025 2209 by Delfina Dillard RN  Outcome: Progressing  6/25/2025 1101 by Courtney Olivas RN  Outcome: Progressing     Problem: Respiratory - Adult  Goal: Achieves optimal ventilation and oxygenation  6/25/2025 2209 by Delfina Dillard RN  Outcome: Progressing  6/25/2025 2005 by Abena Pace RCP  Outcome: Progressing  Flowsheets (Taken 6/25/2025 2000 by Delfina Dillard RN)  Achieves optimal ventilation and oxygenation: Assess for changes in respiratory status  6/25/2025 1101 by Courtney Olivas RN  Outcome: Progressing     Problem: ABCDS Injury Assessment  Goal: Absence of physical injury  6/25/2025 2209 by Delfina Dillard RN  Outcome: Progressing  6/25/2025 1101 by Courtney Olivas RN  Outcome: Progressing     Problem: Skin/Tissue Integrity  Goal: Skin integrity remains intact  Description: 1.  Monitor for areas of redness and/or skin breakdown  2.  Assess vascular access sites hourly  3.  Every 4-6 hours minimum:  Change oxygen saturation probe site  4.  Every 4-6 hours:  If on nasal continuous positive airway pressure, respiratory therapy assess nares and determine need for appliance change or resting period  Outcome: Progressing  Flowsheets (Taken 6/25/2025 2000)  Skin Integrity Remains Intact: Monitor for areas of redness and/or skin breakdown     Problem: Chronic Conditions and Co-morbidities  Goal: Patient's chronic conditions and co-morbidity symptoms are monitored and maintained or improved  Outcome: Progressing  Flowsheets (Taken 6/25/2025 2000)  Care  Plan - Patient's Chronic Conditions and Co-Morbidity Symptoms are Monitored and Maintained or Improved: Monitor and assess patient's chronic conditions and comorbid symptoms for stability, deterioration, or improvement

## 2025-06-26 NOTE — PROGRESS NOTES
PULMONARY & CRITICAL CARE MEDICINE PROGRESS NOTE     Patient:  Sunita Watts  MRN: [id number]  Admit date: 6/24/2025  Primary Care Physician: Kita Newton, APRN - CNP  Consulting Physician: Cornelius Holland MD  CODE Status: Full Code   LOS: 2    HISTORY     CHIEF COMPLAINT/REASON FOR CONSULT:    Acute hypoxic respiratory failure likely on chronic/pulmonary hypertension/COPD.    HISTORY OF PRESENT ILLNESS:  The patient is a 70 y.o. female she has history of COPD severity not known no previous pulmonary function test available Long history of smoking until recently at least 40-pack-year, history of coronary artery disease status post PCI in 2023 last cardiac catheterization was March 2025, chronic diastolic heart failure.  According to patient she has been having gradual increasing shortness of breath for few months but especially in the last 1 week or so.  According patient she does not have cough with sputum production no fever no chills no diarrhea or nausea feels slight abdominal bloating but does complain of shortness of breath without change in his sputum or wheezing.  He came to emergency room and she was started on BiPAP initially and then after that BiPAP has been off and she is placed on high flow nasal cannula.    Her initial labs shows a BUN of 41 creatinine 1.5 bicarbonate was 33 proBNP was greater than 10,000 AST was 827 ALT was 958 total bili 2.2 alkaline phosphatase 459 WBC 12.3 hemoglobin 14.5 hematocrit 48.9 and platelet count is 283.  INR was 1.4 respiratory panel is negative flu is negative urinalysis shows WBC 20-50,  Initial blood gas was 7.4 2/60/36/39 bicarbonate.  CTA chest shows small subsegmental minimal lower lung pulm embolism per radiology no significant pleural effusion or consolidation was seen.    Her last echocardiogram on 12/27/2024 shows moderately reduced RV function RV was severely dilated moderate tricuspid regurgitation and severely elevated RVSP.      INTERVAL

## 2025-06-26 NOTE — PROGRESS NOTES
[facility]   Progress Note                   Date:   6/26/2025  Patient name: Sunita Watts  Date of admission:  6/24/2025 10:16 PM  MRN:   [id number]  YOB: 1955  PCP: Kita Newton, BRET - CNP    Reason for Admission: Acute on chronic respiratory failure (HCC) [J96.20]  NSTEMI (non-ST elevated myocardial infarction) ([facility]) [I21.4]  ALPA (acute kidney injury) [N17.9]  PE (pulmonary thromboembolism) ([facility]) [I26.99]    Subjective:       Clinical Changes / Abnormalities:Pt seen and examined in the room.  Patient resting in bed . Pt denies any CP.  Her SOB has significantly improved. Weaned off hi-flow yesterday and currently on 2L nasal cannula Labs, vitals and tele reviewed-      Medications:   Scheduled Meds:   potassium chloride  40 mEq Oral Daily    [Held by provider] furosemide  20 mg Oral Daily    iron sucrose  200 mg IntraVENous Q24H    ipratropium 0.5 mg-albuterol 2.5 mg  1 Dose Inhalation 4x Daily RT    budesonide-formoterol  2 puff Inhalation BID RT    aspirin  81 mg Oral Daily    [Held by provider] clopidogrel  75 mg Oral Daily    metoprolol tartrate  25 mg Oral BID    [Held by provider] rosuvastatin  20 mg Oral Daily    sodium chloride flush  5-40 mL IntraVENous 2 times per day    azithromycin  250 mg Oral Daily    cefTRIAXone (ROCEPHIN) IV  1,000 mg IntraVENous Q24H    methylPREDNISolone  40 mg IntraVENous Q8H     Continuous Infusions:   sodium chloride      heparin (PORCINE) Infusion 11 Units/kg/hr (06/26/25 1200)     CBC:   Recent Labs     06/24/25  2251 06/25/25  0601 06/26/25  0316   WBC 10.0 8.8 11.6*   HGB 13.2 13.6 13.0    259 232     BMP:    Recent Labs     06/24/25  1354 06/25/25  0601 06/26/25  0316    134* 134*   K 3.3* 4.1 3.6*   CL 89* 92* 89*   CO2 33* 28 30   BUN 41* 44* 56*   CREATININE 1.5* 1.7* 2.5*   GLUCOSE 125* 185* 151*     Hepatic:   Recent Labs     06/24/25  1354 06/25/25  0601 06/26/25  0316   * 765* 681*   * 964*

## 2025-06-26 NOTE — PLAN OF CARE
Problem: Respiratory - Adult  Goal: Achieves optimal ventilation and oxygenation  6/25/2025 2005 by Abena Pace RCP  Outcome: Progressing   NONINVASIVE VENTILATION    PROVIDE OPTIMAL VENTILATION/ACCEPTABLE SPO2   IMPLEMENT NONINVASIVE VENTILATION PROTOCOL   MAINTAIN ACCEPTABLE SPO2   ASSESS SKIN INTEGRITY/BREAKDOWN SCORE   PATIENT EDUCATION AS NEEDED   BIPAP AS NEEDED      BRONCHOSPASM/BRONCHOCONSTRICTION     [x]         IMPROVE AERATION/BREATH SOUNDS  [x]   ADMINISTER BRONCHODILATOR THERAPY AS APPROPRIATE  [x]   ASSESS BREATH SOUNDS  [x]   IMPLEMENT AEROSOL/MDI PROTOCOL  [x]   PATIENT EDUCATION AS NEEDED

## 2025-06-26 NOTE — PROGRESS NOTES
Speech Language Pathology  [facility]    Dysphagia Treatment Note    Date: 6/26/2025  Patient’s Name: Sunita Watts  MRN: [id number]  Diagnosis:   Patient Active Problem List   Diagnosis Code    Elevated blood pressure reading R03.0    Moderate episode of recurrent major depressive disorder (HCC) F33.1    Abnormal stress test R94.39    Acute on chronic respiratory failure (HCC) J96.20    Transaminitis R74.01    Acute pulmonary embolism (HCC) I26.99    Primary hypertension I10    Acute on chronic diastolic congestive heart failure (HCC) I50.33    Nephrotic range proteinuria R80.9    ALPA (acute kidney injury) N17.9    Fatty liver K76.0    Pulmonary hypertension (HCC) I27.20    COPD with acute exacerbation ([facility]) J44.1    Tobacco dependence F17.200       Pain: 0/10    Dysphagia Treatment  Treatment time:1500-508    Subjective: [x] Alert [x] Cooperative     [] Confused     [] Agitated    [] Lethargic    Objective/Assessment:    Pt seen for follow up after a MBSS was completed and ST recommended a Regular diet and Thin liquids (IDDSI Level 0).  Pt was observed with trials of Thin liquids via cup edge and regular texture grapes.  Pt with no difficulty noted with mastication, no oral loss, no oral residual.  Pt with no overt s/s of aspiration noted with trials. Pt was provided with education re: s/s of aspiration  (coughing, choking, temperature spikes, pneumonia) and safe swallow strategies/aspiration precautions (90 degrees for all PO, small bites and sips, alternate liquids and solids, awake and alert for PO intake).  ST recommends continue current diet at this time, no further skilled ST services warranted at this time, ST to sign off. Pt and  verbalized understanding.         Plan:  [] Continue ST services    [x] Discharge from ST:        Discharge recommendations: []  Further therapy recommended at discharge.The patient should be able to tolerate at least 3 hours of therapy per day over 5  days or 15 hours over 7 days. [] Further therapy recommended at discharge.   [x] No therapy recommended at discharge.         Treatment completed by: Debbi Lara M.A., CCC-SLP

## 2025-06-26 NOTE — PLAN OF CARE
Problem: Discharge Planning  Goal: Discharge to home or other facility with appropriate resources  6/26/2025 1000 by Courtney Olivas RN  Outcome: Progressing  6/25/2025 2209 by Delfina Dillard RN  Outcome: Progressing  Flowsheets (Taken 6/25/2025 2000)  Discharge to home or other facility with appropriate resources: Identify barriers to discharge with patient and caregiver     Problem: Safety - Adult  Goal: Free from fall injury  6/26/2025 1000 by Courtney Olivas RN  Outcome: Progressing  6/25/2025 2209 by Delfina Dillard RN  Outcome: Progressing     Problem: Respiratory - Adult  Goal: Achieves optimal ventilation and oxygenation  6/26/2025 1000 by Courtney Olivas RN  Outcome: Progressing  6/25/2025 2209 by Delfina Dillard RN  Outcome: Progressing  6/25/2025 2005 by Abena Pace RCP  Outcome: Progressing  Flowsheets (Taken 6/25/2025 2000 by Delfina Dillard RN)  Achieves optimal ventilation and oxygenation: Assess for changes in respiratory status     Problem: ABCDS Injury Assessment  Goal: Absence of physical injury  6/26/2025 1000 by Courtney Olivas RN  Outcome: Progressing  6/25/2025 2209 by Delfina Dillard RN  Outcome: Progressing     Problem: Skin/Tissue Integrity  Goal: Skin integrity remains intact  Description: 1.  Monitor for areas of redness and/or skin breakdown  2.  Assess vascular access sites hourly  3.  Every 4-6 hours minimum:  Change oxygen saturation probe site  4.  Every 4-6 hours:  If on nasal continuous positive airway pressure, respiratory therapy assess nares and determine need for appliance change or resting period  6/26/2025 1000 by Courtney Olivas RN  Outcome: Progressing  6/25/2025 2209 by Delfina Dillard RN  Outcome: Progressing  Flowsheets (Taken 6/25/2025 2000)  Skin Integrity Remains Intact: Monitor for areas of redness and/or skin breakdown     Problem: Chronic Conditions and Co-morbidities  Goal: Patient's chronic conditions and co-morbidity symptoms

## 2025-06-26 NOTE — PLAN OF CARE
Patient not having inpatient renal biopsy. Will need to have RHC to further evaluate Severe pulmonary HTN, RSVP 87. Patient is able to lay flat. Will keep NPO after midnight.     BRET Mao - NP   [facility]  [phone]

## 2025-06-26 NOTE — PROGRESS NOTES
[facility]  [address], [place] [address]  Phone: [phone]    Fax: [phone]  Dr. Abhijit Lipscomb, CANDIE Mclean NP      SUBJECTIVE      Seen and examined at bedside  No complaints today  Oxygen requirement is decreasing, on 2 L nasal cannula saturating 92%  Tolerating diet, no vomiting or diarrhea     History:  This is a 70 y.o. female with PMHx of COPD ex tobacco use, CAD, cor pulmonale, depression who presents with progressive shortness of breath and fatigue.  She has been feeling dyspneic just walking 20 feet over the last few months but this has been worsening in the last 1 to 2 weeks associated with a nonproductive cough.  She has been diagnosed with a PE and is on heparin drip.  She denies any history of NSAID use or any kidney problems.  She denies nephrolithiasis.  Denies any recent infections. No supplement/OTC medication use aside from tylenol.  Baseline creatinine is 0.6-0.7 and is up to 1.7 mg/dL on the day of the consult. UPCR is 7.08 with no prior available for comparison.  She reports being up to date on her mammogram, no abnormal findings in the past. She has not had a colonoscopy or CT chest. CT a/p with IV contrast done on  showing c/f lytic lesions especially near L3. CT chest for PE protocol showed scattered small PE.  GI has been consulted for elevated LFTs.  OBJECTIVE      CURRENT TEMPERATURE:  Temp: 98.1 °F (36.7 °C)  MAXIMUM TEMPERATURE OVER 24HRS:  Temp (24hrs), Av °F (36.7 °C), Min:97.7 °F (36.5 °C), Max:98.2 °F (36.8 °C)    CURRENT RESPIRATORY RATE:  Respirations: 21  CURRENT PULSE:  Pulse: 76  CURRENT BLOOD PRESSURE:  BP: 113/73  24HR BLOOD PRESSURE RANGE:  Systolic (24hrs), Av , Min:100 , Max:143   ; Diastolic (24hrs), Av, Min:61, Max:90    24HR INTAKE/OUTPUT:    Intake/Output Summary (Last 24 hours) at 2025 1222  Last data filed at 2025

## 2025-06-26 NOTE — PROGRESS NOTES
Sent provider a message regarding the patient's lab work that just resulted. Patient's Calcium is now at a 7.8, Potassium is at a 3.6, and the Creatinine went from a 1.7 to a 2.5. Patient has had 180mL of urine output from her carson since 2230. Patient currently has no complaints of SOB or Chest pain at this time. Waiting on a response from the provider.    Provider responded, orders received.

## 2025-06-26 NOTE — PROGRESS NOTES
INSTRUMENTAL SWALLOW REPORT  MODIFIED BARIUM SWALLOW     NAME: Sunita Watts                               : 1955  MRN: [id number]                                        Date of Eval: 2025                                   Referring Diagnosis(es):         Type of Study: Initial MBS        Patient Complaints/Reason for Referral:  Pt was referred for a MBS to assess the efficiency of his/her swallow function, assess for aspiration, and to make recommendations regarding safe dietary consistencies, effective compensatory strategies, and safe eating environment.     Onset of problem:   Sunita Watts is a 70 y.o. female who presents with shortness of breath with scant nonproductive cough over the past 2 days.  She has also been nauseated with 1 episode of nonbilious nonbloody vomiting.  No diarrhea.  She has not any chest pain or palpitations.  She is a chronic daily habitual smoker and does have a history of COPD but states that she does not wear oxygen at home only only use inhalers as needed.  She states that she quit smoking but when asked more specific she states that she stopped a couple days ago when she originally started not feeling well.  She has not had any fevers chills sweats or bodyaches.  No lightheadedness or dizziness.  No syncopal episodes.  Denies any history of prior MI but does have a history of CAD, had a cardiac cath with stent placement by Dr. Spears last year      Pt with O2 mask for study     Behavior/Cognition/Vision/Hearing:  Behavior/Cognition: Alert;Cooperative  Vision: Impaired  Hearing: Within functional limits     Impressions:  Patient presents with safe swallow for Regular diet with thin liquids as evidenced by no observed aspiration noted with consistencies tested.  Recommend close monitoring for overt/clinical s/s of aspiration and D/C PO intake and complete repeat Modified Barium Swallow Study should they occur.  Results and recommendations reported to patient and  RN.

## 2025-06-26 NOTE — PROGRESS NOTES
[facility] Progress Note    Sunita Watts is a 70 y.o. female patient.  Hospitalization Day:2      Chief consult reason:   Abnormal LFTs     Subjective:  Patient seen and examined at the bedside.  No acute events reported overnight.  Patient denies abdominal pain, nausea, vomiting.  LFTs with improvement today    Objective:   VITALS:  /70   Pulse 77   Temp 98.1 °F (36.7 °C) (Oral)   Resp 21   Ht 1.549 m (5' 0.98\")   Wt 58.5 kg (129 lb)   SpO2 92%   BMI 24.39 kg/m²   TEMPERATURE:  Current - Temp: 98.1 °F (36.7 °C); Max - Temp  Av °F (36.7 °C)  Min: 97.7 °F (36.5 °C)  Max: 98.2 °F (36.8 °C)    Physical Assessment:  Physical Exam  HENT:      Mouth/Throat:      Mouth: Mucous membranes are moist.      Pharynx: Oropharynx is clear.   Eyes:      Pupils: Pupils are equal, round, and reactive to light.   Cardiovascular:      Rate and Rhythm: Normal rate.   Pulmonary:      Effort: Pulmonary effort is normal. No respiratory distress.   Abdominal:      General: Abdomen is flat. Bowel sounds are normal. There is no distension.      Palpations: Abdomen is soft.      Tenderness: There is no abdominal tenderness. There is no guarding.   Skin:     General: Skin is warm and dry.      Coloration: Skin is not jaundiced.   Neurological:      Mental Status: She is alert and oriented to person, place, and time.         Review of Systems:  Review of Systems   All other systems reviewed and are negative.      CURRENT MEDICATIONS:  Scheduled Meds:   potassium chloride  40 mEq Oral Daily    [Held by provider] furosemide  20 mg Oral Daily    iron sucrose  200 mg IntraVENous Q24H    ipratropium 0.5 mg-albuterol 2.5 mg  1 Dose Inhalation 4x Daily RT    budesonide-formoterol  2 puff Inhalation BID RT    aspirin  81 mg Oral Daily    [Held by provider] clopidogrel  75 mg Oral Daily    metoprolol tartrate  25 mg Oral BID    [Held by provider] rosuvastatin  20 mg Oral Daily    sodium chloride flush

## 2025-06-26 NOTE — PROGRESS NOTES
[facility]  Office: [phone]  Aleksandr Frias, DO, Nain Gooden, DO, Papo Hurst DO, Benji Howard, DO, Sandro Contreras MD, Shaila Gray MD, Prince Lewis MD, Nini Song MD,  Rao Khalil MD, Victor Manuel Herman MD, Cornelius Holland MD,  Erasto Lezama DO, Chela Slaughter MD, Alonzo Go MD, Sean Frias DO, Lolly Gomes MD,  Phillip Scanlon DO, Macey Perkins MD, Filomena Gonzalez MD, Morales Monzon MD,  Adolfo Garza MD, Nelly Bush MD, Danielle Dee MD, Lamine Ramos MD, Conor Rivas MD, Abhijeet Harkins MD, Eddy Soto, DO, Komal Samuels MD, Justin Cooper DO, Yuan Foster MD, Erasto Shin MD, Mohsin Reza, MD, Dionicio Garg MD, Shirley Waterhouse, CNP,  Sole Grimes, CNP, Eddy Reyes, CNP,  Ksenia Thrasher, DNP, Soraay Mckeon, CNP, Sabrina Grace, CNP, Aniya Macdonald, CNP, Bridgette Cohen, CNP, Yamilex Spence, PA-C, Olinda Loredo, CNP, Shavon Mian, CNP,  Xiomara Basurto, CNP, Tayler Rosario, CNP, Shon Anand, PA-C, Cortney Wellington, PA-C, Federica Maza, CNP,        Demetra Bloom, CNS, Yoly Coleman, CNP, Jenny Fernandez, CNP         [facility]   IN-PATIENT SERVICE   [facility]    Progress Note    6/26/2025    6:23 AM    Name:   Sunita Watts  MRN:     [id number]     Acct:      [id number]   Room:   2005/2005-01  IP Day:  2  Admit Date:  6/24/2025 10:16 PM    PCP:   Kita Nweton APRN - CNP  Code Status:  Full Code    Subjective:     C/C: SOB  Interval History Status: Improving    Still needs ECHO  Patient seen and evaluated in room maintained on low-flow nasal cannula without difficulty.  Did wear BiPAP overnight. patient states she is feeling a little bit better.  No acute events overnight.    Brief History:     70-year-old patient presents as a transfer from outlying hospital for evaluation of transaminitis with acute kidney injury and shortness of breath initially on BiPAP.  There is some concern for vasculitis given  clinically and consider MRI if relevant. 7. Additional findings, as above. Findings were discussed with EDILBERTO HERNANDEZ at 4:19 pm on 6/24/2025. RECOMMENDATIONS: As above.     CT ABDOMEN PELVIS W IV CONTRAST Additional Contrast? None  Result Date: 6/24/2025  1. Scattered small peripheral acute pulmonary emboli.  No acute right heart strain but more dilated right atrium.  Follow-up cardiology consultation suggested. 2. No acute pulmonary process.  Mucus/debris in the trachea and scattered peripheral small bronchi.  Correlate for some degree aspiration/mucous plugging. 3. Hepatic steatosis. 4. Small amount of free fluid in the posterior pelvis. 5. Prominent uterus and adnexal structures; consider nonemergent US follow-up. 6. Scattered vertebral body and probably iliac bone lucencies, best seen L3. Findings may reflect osteopenia but metastases or myeloma in the differential.  Correlate clinically and consider MRI if relevant. 7. Additional findings, as above. Findings were discussed with EDILBERTO HERNANDEZ at 4:19 pm on 6/24/2025. RECOMMENDATIONS: As above.       Physical Examination:        General appearance:  alert, cooperative and no distress  Mental Status:  oriented to person, place and time and normal affect  Lungs: Posterior crackles present, prolonged expiratory phase on 3 L low-flow nasal cannula  Heart:  regular rate and rhythm, no murmur  Abdomen:  soft, nontender, nondistended, normal bowel sounds, no masses, hepatomegaly, splenomegaly  Extremities:  no edema, redness, tenderness in the calves  Skin:  no gross lesions, rashes, induration    Assessment:        Hospital Problems           Last Modified POA    * (Principal) Acute on chronic respiratory failure (HCC) 6/24/2025 Yes    Transaminitis 6/25/2025 Yes    Acute pulmonary embolism (HCC) 6/25/2025 Yes    Primary hypertension 6/25/2025 Yes    Acute on chronic diastolic congestive heart failure (HCC) 6/25/2025 Yes    Nephrotic range proteinuria 6/25/2025

## 2025-06-27 LAB
ALBUMIN SERPL-MCNC: 3.5 G/DL (ref 3.5–5.2)
ALBUMIN/GLOB SERPL: 1.3 {RATIO} (ref 1–2.5)
ALP SERPL-CCNC: 529 U/L (ref 35–104)
ALT SERPL-CCNC: 656 U/L (ref 10–35)
ANA SER QL IA: NEGATIVE
ANION GAP SERPL CALCULATED.3IONS-SCNC: 15 MMOL/L (ref 9–16)
ANTI-XA UNFRAC HEPARIN: 0.56 IU/L
AST SERPL-CCNC: 291 U/L (ref 10–35)
BASOPHILS # BLD: 0 K/UL (ref 0–0.2)
BASOPHILS NFR BLD: 0 % (ref 0–2)
BILIRUB DIRECT SERPL-MCNC: 0.3 MG/DL (ref 0–0.2)
BILIRUB INDIRECT SERPL-MCNC: 0.3 MG/DL (ref 0–1)
BILIRUB SERPL-MCNC: 0.6 MG/DL (ref 0–1.2)
BUN SERPL-MCNC: 62 MG/DL (ref 8–23)
CALCIUM SERPL-MCNC: 8.5 MG/DL (ref 8.6–10.4)
CHLORIDE SERPL-SCNC: 90 MMOL/L (ref 98–107)
CO2 SERPL-SCNC: 29 MMOL/L (ref 20–31)
CREAT SERPL-MCNC: 3 MG/DL (ref 0.6–0.9)
DSDNA IGG SER QL IA: <0.5 IU/ML
ECHO BSA: 1.59 M2
EOSINOPHIL # BLD: 0 K/UL (ref 0–0.44)
EOSINOPHILS RELATIVE PERCENT: 0 % (ref 1–4)
ERYTHROCYTE [DISTWIDTH] IN BLOOD BY AUTOMATED COUNT: 15.8 % (ref 11.8–14.4)
GFR, ESTIMATED: 16 ML/MIN/1.73M2
GLOBULIN SER CALC-MCNC: 2.6 G/DL
GLUCOSE SERPL-MCNC: 137 MG/DL (ref 74–99)
HCT VFR BLD AUTO: 41.6 % (ref 36.3–47.1)
HGB BLD-MCNC: 12.1 G/DL (ref 11.9–15.1)
IMM GRANULOCYTES # BLD AUTO: 0.12 K/UL (ref 0–0.3)
IMM GRANULOCYTES NFR BLD: 1 %
LYMPHOCYTES NFR BLD: 0.23 K/UL (ref 1.1–3.7)
LYMPHOCYTES RELATIVE PERCENT: 2 % (ref 24–43)
MCH RBC QN AUTO: 24.8 PG (ref 25.2–33.5)
MCHC RBC AUTO-ENTMCNC: 29.1 G/DL (ref 28.4–34.8)
MCV RBC AUTO: 85.4 FL (ref 82.6–102.9)
MONOCYTES NFR BLD: 0.81 K/UL (ref 0.1–1.2)
MONOCYTES NFR BLD: 7 % (ref 3–12)
MORPHOLOGY: ABNORMAL
NEUTROPHILS NFR BLD: 90 % (ref 36–65)
NEUTS SEG NFR BLD: 10.34 K/UL (ref 1.5–8.1)
NRBC BLD-RTO: 0.8 PER 100 WBC
NUCLEAR IGG SER IA-RTO: 0.3 U/ML
PLATELET # BLD AUTO: ABNORMAL K/UL (ref 138–453)
PLATELET, FLUORESCENCE: 198 K/UL (ref 138–453)
PLATELETS.RETICULATED NFR BLD AUTO: 7.1 % (ref 1.1–10.3)
POTASSIUM SERPL-SCNC: 3.8 MMOL/L (ref 3.7–5.3)
PROT SERPL-MCNC: 6.1 G/DL (ref 6.6–8.7)
RBC # BLD AUTO: 4.87 M/UL (ref 3.95–5.11)
SMOOTH MUSCLE ANTIBODY: 8 UNITS (ref 0–19)
SODIUM SERPL-SCNC: 134 MMOL/L (ref 136–145)
WBC OTHER # BLD: 11.5 K/UL (ref 3.5–11.3)

## 2025-06-27 PROCEDURE — 6370000000 HC RX 637 (ALT 250 FOR IP)

## 2025-06-27 PROCEDURE — 6370000000 HC RX 637 (ALT 250 FOR IP): Performed by: INTERNAL MEDICINE

## 2025-06-27 PROCEDURE — 84155 ASSAY OF PROTEIN SERUM: CPT

## 2025-06-27 PROCEDURE — 36415 COLL VENOUS BLD VENIPUNCTURE: CPT

## 2025-06-27 PROCEDURE — 4A023N6 MEASUREMENT OF CARDIAC SAMPLING AND PRESSURE, RIGHT HEART, PERCUTANEOUS APPROACH: ICD-10-PCS | Performed by: INTERNAL MEDICINE

## 2025-06-27 PROCEDURE — 99233 SBSQ HOSP IP/OBS HIGH 50: CPT | Performed by: SURGERY

## 2025-06-27 PROCEDURE — 2700000000 HC OXYGEN THERAPY PER DAY

## 2025-06-27 PROCEDURE — 6360000002 HC RX W HCPCS

## 2025-06-27 PROCEDURE — 76937 US GUIDE VASCULAR ACCESS: CPT | Performed by: INTERNAL MEDICINE

## 2025-06-27 PROCEDURE — 6370000000 HC RX 637 (ALT 250 FOR IP): Performed by: STUDENT IN AN ORGANIZED HEALTH CARE EDUCATION/TRAINING PROGRAM

## 2025-06-27 PROCEDURE — 80048 BASIC METABOLIC PNL TOTAL CA: CPT

## 2025-06-27 PROCEDURE — 85025 COMPLETE CBC W/AUTO DIFF WBC: CPT

## 2025-06-27 PROCEDURE — 93623 PRGRMD STIMJ&PACG IV RX NFS: CPT | Performed by: INTERNAL MEDICINE

## 2025-06-27 PROCEDURE — 94640 AIRWAY INHALATION TREATMENT: CPT

## 2025-06-27 PROCEDURE — 2709999900 HC NON-CHARGEABLE SUPPLY: Performed by: INTERNAL MEDICINE

## 2025-06-27 PROCEDURE — 93503 INSERT/PLACE HEART CATHETER: CPT | Performed by: INTERNAL MEDICINE

## 2025-06-27 PROCEDURE — 99153 MOD SED SAME PHYS/QHP EA: CPT | Performed by: INTERNAL MEDICINE

## 2025-06-27 PROCEDURE — 93451 RIGHT HEART CATH: CPT | Performed by: INTERNAL MEDICINE

## 2025-06-27 PROCEDURE — 99152 MOD SED SAME PHYS/QHP 5/>YRS: CPT | Performed by: INTERNAL MEDICINE

## 2025-06-27 PROCEDURE — 94761 N-INVAS EAR/PLS OXIMETRY MLT: CPT

## 2025-06-27 PROCEDURE — C1894 INTRO/SHEATH, NON-LASER: HCPCS | Performed by: INTERNAL MEDICINE

## 2025-06-27 PROCEDURE — 99232 SBSQ HOSP IP/OBS MODERATE 35: CPT | Performed by: NURSE PRACTITIONER

## 2025-06-27 PROCEDURE — 6360000002 HC RX W HCPCS: Performed by: NURSE PRACTITIONER

## 2025-06-27 PROCEDURE — 93463 DRUG ADMIN & HEMODYNMIC MEAS: CPT | Performed by: INTERNAL MEDICINE

## 2025-06-27 PROCEDURE — 85520 HEPARIN ASSAY: CPT

## 2025-06-27 PROCEDURE — 84165 PROTEIN E-PHORESIS SERUM: CPT

## 2025-06-27 PROCEDURE — 85055 RETICULATED PLATELET ASSAY: CPT

## 2025-06-27 PROCEDURE — C1769 GUIDE WIRE: HCPCS | Performed by: INTERNAL MEDICINE

## 2025-06-27 PROCEDURE — 2060000000 HC ICU INTERMEDIATE R&B

## 2025-06-27 PROCEDURE — 2500000003 HC RX 250 WO HCPCS

## 2025-06-27 PROCEDURE — C1892 INTRO/SHEATH,FIXED,PEEL-AWAY: HCPCS | Performed by: INTERNAL MEDICINE

## 2025-06-27 PROCEDURE — 6360000002 HC RX W HCPCS: Performed by: INTERNAL MEDICINE

## 2025-06-27 PROCEDURE — 99232 SBSQ HOSP IP/OBS MODERATE 35: CPT | Performed by: STUDENT IN AN ORGANIZED HEALTH CARE EDUCATION/TRAINING PROGRAM

## 2025-06-27 PROCEDURE — C1725 CATH, TRANSLUMIN NON-LASER: HCPCS | Performed by: INTERNAL MEDICINE

## 2025-06-27 PROCEDURE — 80076 HEPATIC FUNCTION PANEL: CPT

## 2025-06-27 PROCEDURE — 2580000003 HC RX 258: Performed by: NURSE PRACTITIONER

## 2025-06-27 PROCEDURE — 99232 SBSQ HOSP IP/OBS MODERATE 35: CPT | Performed by: INTERNAL MEDICINE

## 2025-06-27 RX ORDER — MIDAZOLAM HYDROCHLORIDE 1 MG/ML
INJECTION, SOLUTION INTRAMUSCULAR; INTRAVENOUS PRN
Status: DISCONTINUED | OUTPATIENT
Start: 2025-06-27 | End: 2025-06-27 | Stop reason: HOSPADM

## 2025-06-27 RX ORDER — FUROSEMIDE 40 MG/1
80 TABLET ORAL DAILY
Status: DISCONTINUED | OUTPATIENT
Start: 2025-06-28 | End: 2025-06-29

## 2025-06-27 RX ADMIN — METOPROLOL TARTRATE 25 MG: 25 TABLET, FILM COATED ORAL at 08:10

## 2025-06-27 RX ADMIN — BUDESONIDE AND FORMOTEROL FUMARATE DIHYDRATE 2 PUFF: 160; 4.5 AEROSOL RESPIRATORY (INHALATION) at 20:29

## 2025-06-27 RX ADMIN — WATER 1000 MG: 1 INJECTION INTRAMUSCULAR; INTRAVENOUS; SUBCUTANEOUS at 15:12

## 2025-06-27 RX ADMIN — IPRATROPIUM BROMIDE AND ALBUTEROL SULFATE 1 DOSE: .5; 2.5 SOLUTION RESPIRATORY (INHALATION) at 08:08

## 2025-06-27 RX ADMIN — IRON SUCROSE 200 MG: 20 INJECTION, SOLUTION INTRAVENOUS at 17:14

## 2025-06-27 RX ADMIN — BUDESONIDE AND FORMOTEROL FUMARATE DIHYDRATE 2 PUFF: 160; 4.5 AEROSOL RESPIRATORY (INHALATION) at 08:08

## 2025-06-27 RX ADMIN — IPRATROPIUM BROMIDE AND ALBUTEROL SULFATE 1 DOSE: .5; 2.5 SOLUTION RESPIRATORY (INHALATION) at 20:29

## 2025-06-27 RX ADMIN — POTASSIUM CHLORIDE 40 MEQ: 1500 TABLET, EXTENDED RELEASE ORAL at 17:00

## 2025-06-27 RX ADMIN — METHYLPREDNISOLONE SODIUM SUCCINATE 40 MG: 40 INJECTION, POWDER, LYOPHILIZED, FOR SOLUTION INTRAMUSCULAR; INTRAVENOUS at 08:10

## 2025-06-27 RX ADMIN — SODIUM CHLORIDE, PRESERVATIVE FREE 10 ML: 5 INJECTION INTRAVENOUS at 08:11

## 2025-06-27 RX ADMIN — AZITHROMYCIN DIHYDRATE 250 MG: 250 TABLET ORAL at 08:11

## 2025-06-27 RX ADMIN — METOPROLOL TARTRATE 25 MG: 25 TABLET, FILM COATED ORAL at 20:15

## 2025-06-27 ASSESSMENT — PAIN SCALES - GENERAL
PAINLEVEL_OUTOF10: 0

## 2025-06-27 NOTE — PLAN OF CARE
Problem: Discharge Planning  Goal: Discharge to home or other facility with appropriate resources  6/27/2025 1047 by Courtney Olivas RN  Outcome: Progressing  6/27/2025 0358 by Isma Perez RN  Outcome: Progressing  Flowsheets (Taken 6/26/2025 2000 by Delfina Dillard RN)  Discharge to home or other facility with appropriate resources: Identify barriers to discharge with patient and caregiver     Problem: Safety - Adult  Goal: Free from fall injury  6/27/2025 1047 by Courtney Olivas RN  Outcome: Progressing  6/27/2025 0358 by Isma Perez RN  Outcome: Progressing     Problem: Respiratory - Adult  Goal: Achieves optimal ventilation and oxygenation  6/27/2025 1047 by Courtney Olivas RN  Outcome: Progressing  6/27/2025 0358 by Isma Perez RN  Outcome: Progressing  Flowsheets (Taken 6/26/2025 2000 by Delfina Dillard RN)  Achieves optimal ventilation and oxygenation: Assess for changes in respiratory status     Problem: ABCDS Injury Assessment  Goal: Absence of physical injury  6/27/2025 1047 by Courtney Olivas RN  Outcome: Progressing  6/27/2025 0358 by Isma Perez RN  Outcome: Progressing     Problem: Skin/Tissue Integrity  Goal: Skin integrity remains intact  Description: 1.  Monitor for areas of redness and/or skin breakdown  2.  Assess vascular access sites hourly  3.  Every 4-6 hours minimum:  Change oxygen saturation probe site  4.  Every 4-6 hours:  If on nasal continuous positive airway pressure, respiratory therapy assess nares and determine need for appliance change or resting period  6/27/2025 1047 by Courtney Olivas RN  Outcome: Progressing  6/27/2025 0358 by Isma Perez RN  Outcome: Progressing  Flowsheets (Taken 6/26/2025 2000 by Delfina Dillard RN)  Skin Integrity Remains Intact: Monitor for areas of redness and/or skin breakdown     Problem: Chronic Conditions and Co-morbidities  Goal: Patient's chronic conditions and co-morbidity symptoms are

## 2025-06-27 NOTE — PROGRESS NOTES
[facility]  Office: [phone]  Aleksandr Frias, DO, Nain Gooden, DO, Papo Hurst DO, Benji Howard, DO, Sandro Contreras MD, Shaila Gray MD, Prince Lewis MD, Nini Song MD,  Rao Khalil MD, Victor Manuel Herman MD, Cornelius Holland MD,  Erasto Lezama DO, Chela Slaughter MD, Alonzo Go MD, Sean Frias DO, Lolly Gomes MD,  Phillip Scanlon DO, Macey Perkins MD, Filomena Gonzalez MD, Morales Monzon MD,  Adolfo Garza MD, Nelly Bsuh MD, Danielle Dee MD, Lamine Ramos MD, Conor Rivas MD, Abhijeet Harkins MD, Eddy Soto, DO, Komal Samuels MD, Justin Cooper DO, Yuan Foster MD, Erasto Shin MD, Mohsin Reza, MD, Dionicio Garg MD, Shirley Waterhouse, CNP,  Sole Grimes, CNP, Eddy Reyes, CNP,  Ksenia Thrasher, MARKEL, Soraya Mckeon, CNP, Sabrina Grace, CNP, Aniya Macdonald, CNP, Bridgette Cohen, CNP, Yamilex Spence, PA-C, Olinda Loredo, CNP, Shavon Mina, CNP,  Xiomara Basurto, CNP, Tayler Rosario, CNP, Shon Anand, PA-C, Cortney Wellington, PA-C, Federica Maza, CNP,        Demetra Bloom, CNS, Yoly Coleman, CNP, Jenny Fernandez, CNP         [facility]   IN-PATIENT SERVICE   [facility]    Progress Note    6/27/2025    9:05 AM    Name:   Sunita Watts  MRN:     [id number]     Acct:      [id number]   Room:   2005/2005-01  IP Day:  3  Admit Date:  6/24/2025 10:16 PM    PCP:   Kita Newton APRN - CNP  Code Status:  Full Code    Subjective:     C/C: SOB  Interval History Status: Improving    Still needs ECHO  Patient seen and evaluated in room maintained on low-flow nasal cannula without difficulty.  Did wear BiPAP overnight. patient states she is feeling a little bit better.  No acute events overnight.    Brief History:     70-year-old patient presents as a transfer from outlying hospital for evaluation of transaminitis with acute kidney injury and shortness of breath initially on BiPAP.  There is some concern for vasculitis given

## 2025-06-27 NOTE — PROGRESS NOTES
[facility]   Progress Note                   Date:   6/27/2025  Patient name: Sunita Watts  Date of admission:  6/24/2025 10:16 PM  MRN:   [id number]  YOB: 1955  PCP: Kita Newton, BRET - CNP    Reason for Admission: Acute on chronic respiratory failure (HCC) [J96.20]  NSTEMI (non-ST elevated myocardial infarction) ([facility]) [I21.4]  ALPA (acute kidney injury) [N17.9]  PE (pulmonary thromboembolism) ([facility]) [I26.99]    Subjective:       Clinical Changes / Abnormalities:Pt seen and examined in the room.  Patient resting in bed . Pt denies any CP.  Her SOB has significantly improved. currently on 2L nasal cannula Labs, vitals and tele reviewed-n.p.o. for right heart cath today      Medications:   Scheduled Meds:   [START ON 6/28/2025] predniSONE  30 mg Oral Daily    [START ON 6/28/2025] furosemide  80 mg Oral Daily    potassium chloride  40 mEq Oral Daily    ipratropium 0.5 mg-albuterol 2.5 mg  1 Dose Inhalation BID RT    iron sucrose  200 mg IntraVENous Q24H    budesonide-formoterol  2 puff Inhalation BID RT    aspirin  81 mg Oral Daily    [Held by provider] clopidogrel  75 mg Oral Daily    metoprolol tartrate  25 mg Oral BID    [Held by provider] rosuvastatin  20 mg Oral Daily    sodium chloride flush  5-40 mL IntraVENous 2 times per day    azithromycin  250 mg Oral Daily    cefTRIAXone (ROCEPHIN) IV  1,000 mg IntraVENous Q24H     Continuous Infusions:   sodium chloride      heparin (PORCINE) Infusion 11 Units/kg/hr (06/27/25 0735)     CBC:   Recent Labs     06/25/25  0601 06/26/25 0316 06/27/25  0623   WBC 8.8 11.6* 11.5*   HGB 13.6 13.0 12.1    232 See Reflexed IPF Result     BMP:    Recent Labs     06/25/25  0601 06/26/25 0316 06/27/25  0623   * 134* 134*   K 4.1 3.6* 3.8   CL 92* 89* 90*   CO2 28 30 29   BUN 44* 56* 62*   CREATININE 1.7* 2.5* 3.0*   GLUCOSE 185* 151* 137*     Hepatic:   Recent Labs     06/25/25  0601 06/26/25  0316 06/27/25  0623   *  681* 291*   * 945* 656*   BILITOT 1.9* 1.2 0.6   ALKPHOS 424* 491* 529*     Troponin:   Recent Labs     06/24/25  1525 06/24/25  2251 06/25/25  0013   TROPHS 301* 249* 259*     BNP: No results for input(s): \"BNP\" in the last 72 hours.  Lipids:   Recent Labs     06/25/25  0601   CHOL 108   HDL 49     INR:   Recent Labs     06/24/25  1354 06/24/25  2251   INR 1.4 1.6       Objective:   Vitals: /77   Pulse 66   Temp 98 °F (36.7 °C) (Oral)   Resp 17   Ht 1.549 m (5' 0.98\")   Wt 58 kg (127 lb 12.8 oz)   SpO2 99%   BMI 24.16 kg/m²   General appearance: alert and cooperative with exam  HEENT: Head: Normocephalic, no lesions, without obvious abnormality.  Neck: no JVD, trachea midline, no adenopathy  Lungs: Clear to auscultation  Heart: Regular rate and rhythm, s1/s2 auscultated, no murmurs  Abdomen: soft, non-tender, bowel sounds active  Extremities: no edema  Neurologic: not done        Assessment / Acute Cardiac Problems:   Patient Active Problem List:     Elevated blood pressure reading     Moderate episode of recurrent major depressive disorder (HCC)     Abnormal stress test     Acute on chronic respiratory failure (HCC)     Transaminitis     Acute pulmonary embolism (HCC)     Primary hypertension     Acute on chronic diastolic congestive heart failure (HCC)     Nephrotic range proteinuria     ALPA (acute kidney injury)     Fatty liver     Pulmonary hypertension (HCC)     COPD with acute exacerbation (HCC)     Tobacco dependence    CV ASSESSMENT:  Bilateral scattered small pulmonary emboli  Acute on chronic diastolic CHF  Elevated troponin  Acute on chronic hypoxic respiratory failure  Severe pulmonary hypertension  Acute on chronic diastolic heart failure  Hepatic steatosis   Plan of Treatment:   Patient is scheduled for right heart cath primary  request , will add  diuresis per nephrology. Plan for renal biopsy next week per IR, Plavix on hold since 6/25, resume after biopsy   CT PE showing

## 2025-06-27 NOTE — PROGRESS NOTES
[facility]  [address], [place] [address]  Phone: [phone]    Fax: [phone]  Dr. Abhijit Lipscomb, CANDIE Mclean NP      SUBJECTIVE      Seen and examined at bedside  No complaints today  Does not use oxygen at home  Has orthopnea for the last 3 years, does not lie down completely flat at night   3 L nasal cannula requirement saturating 99%       History:  This is a 70 y.o. female with PMHx of COPD ex tobacco use, CAD, cor pulmonale, depression who presents with progressive shortness of breath and fatigue.  She has been feeling dyspneic just walking 20 feet over the last few months but this has been worsening in the last 1 to 2 weeks associated with a nonproductive cough.  She has been diagnosed with a PE and is on heparin drip.  She denies any history of NSAID use or any kidney problems.  She denies nephrolithiasis.  Denies any recent infections. No supplement/OTC medication use aside from tylenol.  Baseline creatinine is 0.6-0.7 and is up to 1.7 mg/dL on the day of the consult. UPCR is 7.08 with no prior available for comparison.  She reports being up to date on her mammogram, no abnormal findings in the past. She has not had a colonoscopy or CT chest. CT a/p with IV contrast done on  showing c/f lytic lesions especially near L3. CT chest for PE protocol showed scattered small PE.  GI has been consulted for elevated LFTs.  OBJECTIVE      CURRENT TEMPERATURE:  Temp: 98.1 °F (36.7 °C)  MAXIMUM TEMPERATURE OVER 24HRS:  Temp (24hrs), Av.3 °F (36.8 °C), Min:98.1 °F (36.7 °C), Max:98.5 °F (36.9 °C)    CURRENT RESPIRATORY RATE:  Respirations: 17  CURRENT PULSE:  Pulse: 66  CURRENT BLOOD PRESSURE:  BP: 125/77  24HR BLOOD PRESSURE RANGE:  Systolic (24hrs), Av , Min:118 , Max:140   ; Diastolic (24hrs), Av, Min:68, Max:86    24HR INTAKE/OUTPUT:    Intake/Output Summary (Last 24 hours) at  falsely high due to chronic hypoxia, anemia, ALPA, proteinuria raises concern for multiple myeloma  6. COPD, AHRF, pulmonary hypertension    PLAN      1.  Follow-up UPEP and serum immunofixation, PLA2R  2.  Plan for renal biopsy next week, I will consult IR, Plavix on hold since 6/25, patient agreeable  3.  Start p.o. Lasix tomorrow  4.  Potassium supplement  5.  Follow-up right heart cath  6. Would eventually benefit from RAASi but not during ALPA; defer to cardiology to start beta blocker      Please do not hesitate to call with questions.    This note is created with the assistance of a speech-recognition program. While intending to generate a document that actually reflects the content of the visit, no guarantees can be provided that every mistake has been identified and corrected by editing    Electronically signed by Nakita Miranda MD on 6/27/2025 at 12:08 PM

## 2025-06-27 NOTE — PROGRESS NOTES
PULMONARY & CRITICAL CARE MEDICINE PROGRESS NOTE     Patient:  Sunita Watts  MRN: [id number]  Admit date: 6/24/2025  Primary Care Physician: Kita Newton, APRN - CNP  Consulting Physician: Cornelius Holland MD  CODE Status: Full Code   LOS: 3    HISTORY     CHIEF COMPLAINT/REASON FOR CONSULT:    Acute hypoxic respiratory failure likely on chronic/pulmonary hypertension/COPD.    HISTORY OF PRESENT ILLNESS:  The patient is a 70 y.o. female she has history of COPD severity not known no previous pulmonary function test available Long history of smoking until recently at least 40-pack-year, history of coronary artery disease status post PCI in 2023 last cardiac catheterization was March 2025, chronic diastolic heart failure.  According to patient she has been having gradual increasing shortness of breath for few months but especially in the last 1 week or so.  According patient she does not have cough with sputum production no fever no chills no diarrhea or nausea feels slight abdominal bloating but does complain of shortness of breath without change in his sputum or wheezing.  He came to emergency room and she was started on BiPAP initially and then after that BiPAP has been off and she is placed on high flow nasal cannula.    Her initial labs shows a BUN of 41 creatinine 1.5 bicarbonate was 33 proBNP was greater than 10,000 AST was 827 ALT was 958 total bili 2.2 alkaline phosphatase 459 WBC 12.3 hemoglobin 14.5 hematocrit 48.9 and platelet count is 283.  INR was 1.4 respiratory panel is negative flu is negative urinalysis shows WBC 20-50,  Initial blood gas was 7.4 2/60/36/39 bicarbonate.  CTA chest shows small subsegmental minimal lower lung pulm embolism per radiology no significant pleural effusion or consolidation was seen.    Her last echocardiogram on 12/27/2024 shows moderately reduced RV function RV was severely dilated moderate tricuspid regurgitation and severely elevated RVSP.      INTERVAL

## 2025-06-27 NOTE — PLAN OF CARE
Problem: Discharge Planning  Goal: Discharge to home or other facility with appropriate resources  Outcome: Progressing  Flowsheets (Taken 6/26/2025 2000 by Delfina Dillard, RN)  Discharge to home or other facility with appropriate resources: Identify barriers to discharge with patient and caregiver     Problem: Safety - Adult  Goal: Free from fall injury  Outcome: Progressing     Problem: Respiratory - Adult  Goal: Achieves optimal ventilation and oxygenation  Outcome: Progressing  Flowsheets (Taken 6/26/2025 2000 by Delfina Dillard, RN)  Achieves optimal ventilation and oxygenation: Assess for changes in respiratory status     Problem: ABCDS Injury Assessment  Goal: Absence of physical injury  Outcome: Progressing     Problem: Skin/Tissue Integrity  Goal: Skin integrity remains intact  Description: 1.  Monitor for areas of redness and/or skin breakdown  2.  Assess vascular access sites hourly  3.  Every 4-6 hours minimum:  Change oxygen saturation probe site  4.  Every 4-6 hours:  If on nasal continuous positive airway pressure, respiratory therapy assess nares and determine need for appliance change or resting period  Outcome: Progressing  Flowsheets (Taken 6/26/2025 2000 by Delfina Dillard, RN)  Skin Integrity Remains Intact: Monitor for areas of redness and/or skin breakdown     Problem: Chronic Conditions and Co-morbidities  Goal: Patient's chronic conditions and co-morbidity symptoms are monitored and maintained or improved  Outcome: Progressing  Flowsheets (Taken 6/26/2025 2000 by Delfina Dillard, RN)  Care Plan - Patient's Chronic Conditions and Co-Morbidity Symptoms are Monitored and Maintained or Improved: Monitor and assess patient's chronic conditions and comorbid symptoms for stability, deterioration, or improvement

## 2025-06-27 NOTE — PROCEDURES
[facility]    R/L CARDIAC CATHETERIZATION    Date:   6/27/2025  Patient name:  Sunita Watts  Date of admission:  6/24/2025 10:16 PM  MRN:   [id number]  YOB: 1955    Operators:  Primary:   FLORENCIO Spears MD (Attending Physician)    Assistant/CV fellow: Rosemarie Boswell MD    Procedure performed:     [] Left Heart Catheterization.   [] Graft Angiography.  [] Left Ventriculography.     [x] Right Heart Catheterization.  [x] Coronary Angiography.    [] Aortic Valve Studies.  [] PCI:      [] Other:     Pre Procedure Conscious Sedation Data:  ASA Class:    [] I [x] II [] III [] IV    Mallampati Class:  [] I [x] II [] III [] IV    Indication:  [x] Pulmonary Hypertension      [] + Stress test  [] ACS      [] + EKG Changes  [] Non Q MI       [] Significant Risk Factors  [] Recurrent Angina             [] Diabetes Mellitus    [] New LBBB      [] Uncontrolled HTN.  [] CHF / Low EF changes     [] Abnormal CTA / Ca Score    History and Risk Factors    [] Hypertension     [] Family history of CAD  [] Hyperlipidemia     [] Cerebrovascular Disease   [] Prior MI       [] Peripheral Vascular disease   [] Prior PCI              [] Diabetes Mellitus    [] Left Main PCI.     [] Currently on Dialysis.  [] Prior CABG.      [] Currently smoker.    Procedure:  Access:  [x] IJ  [] Radial  artery       [x] Right  [] Left    Procedure: After informed consent was obtained with explanation of the risks and benefits, patient was brought to the cath lab. The access area was prepped and draped in sterile fashion. 1% lidocaine was used for local block. The artery was cannulated with 6  Fr sheath with brisk arterial blood return. The side port was frequently flushed and aspirated with normal saline.      Findings:  RIGHT HEART CATHETERIZATION HEMODYNAMIC MEASUREMENTS  Procedure: After informed consent was obtained with explanation of the risks and benefits, the patient was brought to the cath lab. The right side of the

## 2025-06-27 NOTE — PROGRESS NOTES
[facility] Gastroenterology Progress Note    Sunita Watts is a 70 y.o. female patient.  Hospitalization Day:3      Chief consult reason:   Abnormal LFTs     Subjective:  Patient seen and examined at the bedside.  No acute events reported overnight.  Patient denies abdominal pain, nausea, vomiting.  Slight improvement in LFTs    Objective:   VITALS:  /77   Pulse 66   Temp 98 °F (36.7 °C) (Oral)   Resp 17   Ht 1.549 m (5' 0.98\")   Wt 58 kg (127 lb 12.8 oz)   SpO2 99%   BMI 24.16 kg/m²   TEMPERATURE:  Current - Temp: 98 °F (36.7 °C); Max - Temp  Av.3 °F (36.8 °C)  Min: 98 °F (36.7 °C)  Max: 98.5 °F (36.9 °C)    Physical Assessment:  Physical Exam  HENT:      Mouth/Throat:      Mouth: Mucous membranes are moist.      Pharynx: Oropharynx is clear.   Eyes:      Pupils: Pupils are equal, round, and reactive to light.   Cardiovascular:      Rate and Rhythm: Normal rate.   Pulmonary:      Effort: Pulmonary effort is normal. No respiratory distress.   Abdominal:      General: Abdomen is flat. Bowel sounds are normal. There is no distension.      Palpations: Abdomen is soft.      Tenderness: There is no abdominal tenderness. There is no guarding.   Skin:     General: Skin is warm and dry.      Coloration: Skin is not jaundiced.   Neurological:      Mental Status: She is alert and oriented to person, place, and time.         Review of Systems:  Review of Systems   All other systems reviewed and are negative.      CURRENT MEDICATIONS:  Scheduled Meds:   [START ON 2025] predniSONE  30 mg Oral Daily    [START ON 2025] furosemide  80 mg Oral Daily    potassium chloride  40 mEq Oral Daily    ipratropium 0.5 mg-albuterol 2.5 mg  1 Dose Inhalation BID RT    iron sucrose  200 mg IntraVENous Q24H    budesonide-formoterol  2 puff Inhalation BID RT    aspirin  81 mg Oral Daily    [Held by provider] clopidogrel  75 mg Oral Daily    metoprolol tartrate  25 mg Oral BID    [Held by provider]

## 2025-06-28 LAB
ALBUMIN SERPL-MCNC: 3.5 G/DL (ref 3.5–5.2)
ALBUMIN/GLOB SERPL: 1.3 {RATIO} (ref 1–2.5)
ALP SERPL-CCNC: 541 U/L (ref 35–104)
ALT SERPL-CCNC: 506 U/L (ref 10–35)
ANION GAP SERPL CALCULATED.3IONS-SCNC: 13 MMOL/L (ref 9–16)
ANTI-XA UNFRAC HEPARIN: 0.28 IU/L
ANTI-XA UNFRAC HEPARIN: 0.32 IU/L
ANTI-XA UNFRAC HEPARIN: 0.42 IU/L
ANTI-XA UNFRAC HEPARIN: 0.48 IU/L
AST SERPL-CCNC: 179 U/L (ref 10–35)
BILIRUB SERPL-MCNC: 0.7 MG/DL (ref 0–1.2)
BUN SERPL-MCNC: 62 MG/DL (ref 8–23)
CALCIUM SERPL-MCNC: 8.8 MG/DL (ref 8.6–10.4)
CHLORIDE SERPL-SCNC: 94 MMOL/L (ref 98–107)
CO2 SERPL-SCNC: 30 MMOL/L (ref 20–31)
CREAT SERPL-MCNC: 3 MG/DL (ref 0.6–0.9)
ERYTHROCYTE [DISTWIDTH] IN BLOOD BY AUTOMATED COUNT: 15.8 % (ref 11.8–14.4)
GFR, ESTIMATED: 16 ML/MIN/1.73M2
GLUCOSE SERPL-MCNC: 107 MG/DL (ref 74–99)
HCT VFR BLD AUTO: 40.3 % (ref 36.3–47.1)
HGB BLD-MCNC: 11.7 G/DL (ref 11.9–15.1)
MCH RBC QN AUTO: 24.8 PG (ref 25.2–33.5)
MCHC RBC AUTO-ENTMCNC: 29 G/DL (ref 28.4–34.8)
MCV RBC AUTO: 85.4 FL (ref 82.6–102.9)
NRBC BLD-RTO: 0.3 PER 100 WBC
PLA2R IGG SERPL QL IF: NORMAL
PLATELET # BLD AUTO: 216 K/UL (ref 138–453)
PMV BLD AUTO: 12 FL (ref 8.1–13.5)
POTASSIUM SERPL-SCNC: 4.6 MMOL/L (ref 3.7–5.3)
PROT SERPL-MCNC: 6.1 G/DL (ref 6.6–8.7)
RBC # BLD AUTO: 4.72 M/UL (ref 3.95–5.11)
SODIUM SERPL-SCNC: 137 MMOL/L (ref 136–145)
WBC OTHER # BLD: 12.1 K/UL (ref 3.5–11.3)

## 2025-06-28 PROCEDURE — 2500000003 HC RX 250 WO HCPCS

## 2025-06-28 PROCEDURE — 6360000002 HC RX W HCPCS

## 2025-06-28 PROCEDURE — 6370000000 HC RX 637 (ALT 250 FOR IP)

## 2025-06-28 PROCEDURE — 99233 SBSQ HOSP IP/OBS HIGH 50: CPT | Performed by: NURSE PRACTITIONER

## 2025-06-28 PROCEDURE — 99232 SBSQ HOSP IP/OBS MODERATE 35: CPT | Performed by: INTERNAL MEDICINE

## 2025-06-28 PROCEDURE — 6370000000 HC RX 637 (ALT 250 FOR IP): Performed by: INTERNAL MEDICINE

## 2025-06-28 PROCEDURE — 99232 SBSQ HOSP IP/OBS MODERATE 35: CPT | Performed by: STUDENT IN AN ORGANIZED HEALTH CARE EDUCATION/TRAINING PROGRAM

## 2025-06-28 PROCEDURE — 85520 HEPARIN ASSAY: CPT

## 2025-06-28 PROCEDURE — 85027 COMPLETE CBC AUTOMATED: CPT

## 2025-06-28 PROCEDURE — 94640 AIRWAY INHALATION TREATMENT: CPT

## 2025-06-28 PROCEDURE — 36415 COLL VENOUS BLD VENIPUNCTURE: CPT

## 2025-06-28 PROCEDURE — 80053 COMPREHEN METABOLIC PANEL: CPT

## 2025-06-28 PROCEDURE — 6370000000 HC RX 637 (ALT 250 FOR IP): Performed by: STUDENT IN AN ORGANIZED HEALTH CARE EDUCATION/TRAINING PROGRAM

## 2025-06-28 PROCEDURE — 6360000002 HC RX W HCPCS: Performed by: NURSE PRACTITIONER

## 2025-06-28 PROCEDURE — 94761 N-INVAS EAR/PLS OXIMETRY MLT: CPT

## 2025-06-28 PROCEDURE — 2060000000 HC ICU INTERMEDIATE R&B

## 2025-06-28 PROCEDURE — 99233 SBSQ HOSP IP/OBS HIGH 50: CPT | Performed by: INTERNAL MEDICINE

## 2025-06-28 PROCEDURE — 2700000000 HC OXYGEN THERAPY PER DAY

## 2025-06-28 RX ADMIN — HEPARIN SODIUM 1600 UNITS: 1000 INJECTION INTRAVENOUS; SUBCUTANEOUS at 08:50

## 2025-06-28 RX ADMIN — WATER 1000 MG: 1 INJECTION INTRAMUSCULAR; INTRAVENOUS; SUBCUTANEOUS at 12:00

## 2025-06-28 RX ADMIN — SODIUM CHLORIDE, PRESERVATIVE FREE 10 ML: 5 INJECTION INTRAVENOUS at 08:42

## 2025-06-28 RX ADMIN — IPRATROPIUM BROMIDE AND ALBUTEROL SULFATE 1 DOSE: .5; 2.5 SOLUTION RESPIRATORY (INHALATION) at 07:16

## 2025-06-28 RX ADMIN — METOPROLOL TARTRATE 25 MG: 25 TABLET, FILM COATED ORAL at 08:36

## 2025-06-28 RX ADMIN — BUDESONIDE AND FORMOTEROL FUMARATE DIHYDRATE 2 PUFF: 160; 4.5 AEROSOL RESPIRATORY (INHALATION) at 20:58

## 2025-06-28 RX ADMIN — FUROSEMIDE 80 MG: 40 TABLET ORAL at 08:36

## 2025-06-28 RX ADMIN — BUDESONIDE AND FORMOTEROL FUMARATE DIHYDRATE 2 PUFF: 160; 4.5 AEROSOL RESPIRATORY (INHALATION) at 07:24

## 2025-06-28 RX ADMIN — PREDNISONE 30 MG: 20 TABLET ORAL at 08:36

## 2025-06-28 RX ADMIN — IPRATROPIUM BROMIDE AND ALBUTEROL SULFATE 1 DOSE: .5; 2.5 SOLUTION RESPIRATORY (INHALATION) at 20:58

## 2025-06-28 RX ADMIN — HEPARIN SODIUM 11 UNITS/KG/HR: 10000 INJECTION, SOLUTION INTRAVENOUS at 07:18

## 2025-06-28 RX ADMIN — AZITHROMYCIN DIHYDRATE 250 MG: 250 TABLET ORAL at 08:42

## 2025-06-28 RX ADMIN — POTASSIUM CHLORIDE 40 MEQ: 1500 TABLET, EXTENDED RELEASE ORAL at 08:35

## 2025-06-28 RX ADMIN — METOPROLOL TARTRATE 25 MG: 25 TABLET, FILM COATED ORAL at 20:28

## 2025-06-28 ASSESSMENT — PAIN SCALES - GENERAL: PAINLEVEL_OUTOF10: 0

## 2025-06-28 NOTE — PROGRESS NOTES
[facility]   Gastroenterology Progress Note    Sunita Watts is a 70 y.o. female patient.  Hospitalization Day:4      Chief consult reason:   Abnormal LFTs    Subjective:  Seen and examined, no acute events overnight  Alk phos 491-541, -506, -179, T. bili 1.2-0.7    VITALS:  /76   Pulse 71   Temp 98.2 °F (36.8 °C) (Oral)   Resp 14   Ht 1.549 m (5' 0.98\")   Wt 58 kg (127 lb 12.8 oz)   SpO2 96%   BMI 24.16 kg/m²   TEMPERATURE:  Current - Temp: 98.2 °F (36.8 °C); Max - Temp  Av.2 °F (36.8 °C)  Min: 98.1 °F (36.7 °C)  Max: 98.3 °F (36.8 °C)    Physical Assessment:  General appearance:  alert, cooperative and no distress  Mental Status:  oriented to person, place and time and normal affect  Lungs:  clear to auscultation bilaterally, normal effort  Heart:  regular rate and rhythm, no murmur  Abdomen:  soft, nontender, nondistended, normal bowel sounds, no masses, hepatomegaly, splenomegaly  Extremities:  no edema, redness, tenderness in the calves  Skin:  no gross lesions, rashes, induration    Data Review:    Labs and Imaging:       CBC:  Recent Labs     25  0623 25  0811   WBC 11.6* 11.5* 12.1*   HGB 13.0 12.1 11.7*   MCV 84.3 85.4 85.4   RDW 15.7* 15.8* 15.8*    See Reflexed IPF Result 216       ANEMIA STUDIES:  No results for input(s): \"TIBC\", \"FERRITIN\", \"SEOXYBXE02\", \"FOLATE\", \"OCCULTBLD\" in the last 72 hours.    Invalid input(s): \"LABIRON\"    BMP:  Recent Labs     25  0316 25  0623 25  0811   * 134* 137   K 3.6* 3.8 4.6   CL 89* 90* 94*   CO2 30 29 30   BUN 56* 62* 62*   CREATININE 2.5* 3.0* 3.0*   GLUCOSE 151* 137* 107*   CALCIUM 7.8* 8.5* 8.8       LFTS:  Recent Labs     25  0316 25  0623 25  0811   ALKPHOS 491* 529* 541*   * 656* 506*   * 291* 179*   BILITOT 1.2 0.6 0.7   BILIDIR 0.9* 0.3*  --        Other pertinent labs:  HIV nonreactive  EBV pending  SMA, AMA,  necessary    More than 50% of the time was spent taking care of this patient in addition to the nurse practitioner time.  That also included history taking follow-up physical examination and review of system.    Electronically signed by Yash Talbert MD

## 2025-06-28 NOTE — PLAN OF CARE
Problem: Discharge Planning  Goal: Discharge to home or other facility with appropriate resources  6/27/2025 2055 by Brielle Walter RN  Outcome: Progressing  Flowsheets (Taken 6/27/2025 2000)  Discharge to home or other facility with appropriate resources:   Identify barriers to discharge with patient and caregiver   Arrange for needed discharge resources and transportation as appropriate  6/27/2025 1047 by Courtney Olivas RN  Outcome: Progressing     Problem: Safety - Adult  Goal: Free from fall injury  6/27/2025 2055 by Brielle Walter RN  Outcome: Progressing  6/27/2025 1047 by Courtney Olivas RN  Outcome: Progressing     Problem: Respiratory - Adult  Goal: Achieves optimal ventilation and oxygenation  6/27/2025 2055 by Brielle Walter RN  Outcome: Progressing  Flowsheets (Taken 6/27/2025 2000)  Achieves optimal ventilation and oxygenation: Assess for changes in respiratory status  6/27/2025 1047 by Courtney Olivas RN  Outcome: Progressing     Problem: ABCDS Injury Assessment  Goal: Absence of physical injury  6/27/2025 2055 by Brielle Walter RN  Outcome: Progressing  6/27/2025 1047 by Courtney Olivas RN  Outcome: Progressing     Problem: Skin/Tissue Integrity  Goal: Skin integrity remains intact  Description: 1.  Monitor for areas of redness and/or skin breakdown  2.  Assess vascular access sites hourly  3.  Every 4-6 hours minimum:  Change oxygen saturation probe site  4.  Every 4-6 hours:  If on nasal continuous positive airway pressure, respiratory therapy assess nares and determine need for appliance change or resting period  6/27/2025 2055 by Brielle Walter RN  Outcome: Progressing  Flowsheets (Taken 6/27/2025 2000)  Skin Integrity Remains Intact:   Monitor for areas of redness and/or skin breakdown   Turn and reposition as indicated  6/27/2025 1047 by Courtney Olivas RN  Outcome: Progressing     Problem: Chronic Conditions and Co-morbidities  Goal:

## 2025-06-28 NOTE — PLAN OF CARE
Problem: Discharge Planning  Goal: Discharge to home or other facility with appropriate resources  Outcome: Progressing     Problem: Safety - Adult  Goal: Free from fall injury  Outcome: Progressing     Problem: Respiratory - Adult  Goal: Achieves optimal ventilation and oxygenation  Outcome: Progressing  Flowsheets (Taken 6/28/2025 5717 by Dayana Armando RCP)  Achieves optimal ventilation and oxygenation:   Respiratory therapy support as indicated   Assess and instruct to report shortness of breath or any respiratory difficulty   Assess the need for suctioning and aspirate as needed   Encourage broncho-pulmonary hygiene including cough, deep breathe, incentive spirometry   Initiate smoking cessation protocol as indicated   Oxygen supplementation based on oxygen saturation or arterial blood gases   Position to facilitate oxygenation and minimize respiratory effort   Assess for changes in mentation and behavior   Assess for changes in respiratory status     Problem: ABCDS Injury Assessment  Goal: Absence of physical injury  Outcome: Progressing     Problem: Skin/Tissue Integrity  Goal: Skin integrity remains intact  Outcome: Progressing     Problem: Chronic Conditions and Co-morbidities  Goal: Patient's chronic conditions and co-morbidity symptoms are monitored and maintained or improved  Outcome: Progressing

## 2025-06-28 NOTE — PROGRESS NOTES
[facility].  [address], [address] [place]  [place] [address]  Phone: [phone]    Fax: [phone]  Dr. Abhijit Lipscomb, CANDIE Mclean NP      SUBJECTIVE      Seen and examined at bedside  Ambulating around the unit without problem  1.2 L urine output recorded yesterday         History:  This is a 70 y.o. female with PMHx of COPD ex tobacco use, CAD, cor pulmonale, depression who presents with progressive shortness of breath and fatigue.  She has been feeling dyspneic just walking 20 feet over the last few months but this has been worsening in the last 1 to 2 weeks associated with a nonproductive cough.  She has been diagnosed with a PE and is on heparin drip.  She denies any history of NSAID use or any kidney problems.  She denies nephrolithiasis.  Denies any recent infections. No supplement/OTC medication use aside from tylenol.  Baseline creatinine is 0.6-0.7 and is up to 1.7 mg/dL on the day of the consult. UPCR is 7.08 with no prior available for comparison.  She reports being up to date on her mammogram, no abnormal findings in the past. She has not had a colonoscopy or CT chest. CT a/p with IV contrast done on  showing c/f lytic lesions especially near L3. CT chest for PE protocol showed scattered small PE.  GI has been consulted for elevated LFTs.  OBJECTIVE      CURRENT TEMPERATURE:  Temp: 98.2 °F (36.8 °C)  MAXIMUM TEMPERATURE OVER 24HRS:  Temp (24hrs), Av.2 °F (36.8 °C), Min:98.1 °F (36.7 °C), Max:98.3 °F (36.8 °C)    CURRENT RESPIRATORY RATE:  Respirations: 14  CURRENT PULSE:  Pulse: 71  CURRENT BLOOD PRESSURE:  BP: 112/76  24HR BLOOD PRESSURE RANGE:  Systolic (24hrs), Av , Min:94 , Max:126   ; Diastolic (24hrs), Av, Min:55, Max:76    24HR INTAKE/OUTPUT:    Intake/Output Summary (Last 24 hours) at 2025 1608  Last data filed at 2025 1155  Gross per 24 hour   Intake 770 ml   Output  Value Date    C4 21 06/25/2025     MPO ANCA:   Lab Results   Component Value Date/Time    MPO <0.3 06/25/2025 09:07 AM    .  PR3 ANCA:    Lab Results   Component Value Date/Time    PR3 <0.7 06/25/2025 09:07 AM     URINE SODIUM:  No components found for: \"BOB\"   URINE CREATININE:  No results found for: \"LABCREA\"  URINE EOSINOPHILS: No results found for: \"UREO\"  URINE PROTEIN:  No results found for: \"TPU\"  URINALYSIS:  U/A:   Lab Results   Component Value Date/Time    NITRU NEGATIVE 06/25/2025 01:24 AM    COLORU Yellow 06/25/2025 01:24 AM    PHUR 6.0 06/25/2025 01:24 AM    WBCUA 20 TO 50 06/25/2025 01:24 AM    RBCUA 5 TO 10 06/25/2025 01:24 AM    BACTERIA None 06/25/2025 01:24 AM    LEUKOCYTESUR NEGATIVE 06/25/2025 01:24 AM    UROBILINOGEN 1.0 06/25/2025 01:24 AM    BILIRUBINUR NEGATIVE 06/25/2025 01:24 AM    GLUCOSEU NEGATIVE 06/25/2025 01:24 AM    KETUA NEGATIVE 06/25/2025 01:24 AM     ANTIGBM:  Lab Results   Component Value Date/Time    GBMABIGG <1.9 06/25/2025 09:07 AM         RADIOLOGY      Reviewed as available.      ASSESSMENT      1. Nonoliguric ALPA likely hemodynamic from low bp and severe pulmonary hypertension     2. Nephrotic range proteinuria without nephrotic syndrome  Albumin normal   UA with blood and wbc but many epi cells  HIV, hepatitis panel negative  Ddx includes multiple myeloma, minimal change disease, FSGS, amyloidosis     3. PE without right heart strain    4.  Mixed metabolic alkalosis and chronic hypercapnia  5. Severe pulmonary hypertension, acute on chronic HFpEF, improved with IV Lasix  6.  Iron deficiency-hemoglobin is likely falsely high due to chronic hypoxia, anemia, ALPA, proteinuria raises concern for multiple myeloma  6. COPD, AHRF, pulmonary hypertension    PLAN      1.  Follow-up UPEP and serum immunofixation, PLA2R  2.  Plan for renal biopsy next week, will reconsult IR let them know patient is remaining inpatient Plavix on hold since 6/25, patient agreeable  3.  Continue Lasix

## 2025-06-28 NOTE — PROGRESS NOTES
[facility]   Progress Note                   Date:   6/28/2025  Patient name: Sunita Watts  Date of admission:  6/24/2025 10:16 PM  MRN:   [id number]  YOB: 1955  PCP: Kita Newton, BRET - CNP    Reason for Admission: Acute on chronic respiratory failure (HCC) [J96.20]  NSTEMI (non-ST elevated myocardial infarction) ([facility]) [I21.4]  ALPA (acute kidney injury) [N17.9]  PE (pulmonary thromboembolism) ([facility]) [I26.99]    Subjective:       Clinical Changes / Abnormalities: Pt seen and examined in the room.  Patient resting in the chair. Pt denies any CP. She reports that her breathing is improving. Labs, vitals and tele reviewed- SR 72  Heparin drip infusing.   S/P RHC     Medications:   Scheduled Meds:   predniSONE  30 mg Oral Daily    furosemide  80 mg Oral Daily    potassium chloride  40 mEq Oral Daily    ipratropium 0.5 mg-albuterol 2.5 mg  1 Dose Inhalation BID RT    budesonide-formoterol  2 puff Inhalation BID RT    metoprolol tartrate  25 mg Oral BID    [Held by provider] rosuvastatin  20 mg Oral Daily    sodium chloride flush  5-40 mL IntraVENous 2 times per day    cefTRIAXone (ROCEPHIN) IV  1,000 mg IntraVENous Q24H     Continuous Infusions:   sodium chloride      heparin (PORCINE) Infusion 13 Units/kg/hr (06/28/25 0848)     CBC:   Recent Labs     06/26/25  0316 06/27/25  0623 06/28/25  0811   WBC 11.6* 11.5* 12.1*   HGB 13.0 12.1 11.7*    See Reflexed IPF Result 216     BMP:    Recent Labs     06/26/25  0316 06/27/25  0623 06/28/25  0811   * 134* 137   K 3.6* 3.8 4.6   CL 89* 90* 94*   CO2 30 29 30   BUN 56* 62* 62*   CREATININE 2.5* 3.0* 3.0*   GLUCOSE 151* 137* 107*     Hepatic:   Recent Labs     06/26/25  0316 06/27/25  0623 06/28/25  0811   * 291* 179*   * 656* 506*   BILITOT 1.2 0.6 0.7   ALKPHOS 491* 529* 541*     Troponin:   No results for input(s): \"TROPHS\" in the last 72 hours.    BNP: No results for input(s): \"BNP\" in the last 72

## 2025-06-28 NOTE — PROGRESS NOTES
[facility]  Office: [phone]  Aleksandr Frias, DO, Nain Gooden, DO, Papo Hurst DO, Benji Howard, DO, Sandro Contreras MD, Shaila Gray MD, Prince Lewis MD, Nini Song MD,  Rao Khalil MD, Victor Manuel Herman MD, Cornelius Holland MD,  Erasto Lezama DO, Chela Slaughter MD, Alonzo Go MD, Sean Frias DO, Lolly Gomes MD,  Phillip Scanlon DO, Macey Perkins MD, Filomena Gonzalez MD, Morales Monzon MD,  Adolfo Garza MD, Nelly Bush MD, Danielle Dee MD, Lamine Ramos MD, Conor Rivas MD, Abhijeet Harkins MD, Eddy Soto, DO, Komal Samuels MD, Justin Cooper DO, Yuan Foster MD, Erasto Shin MD, Mohsin Reza, MD, Dionicio Garg MD, Shirley Waterhouse, CNP,  Sole Grimes, CNP, Eddy Reyes, CNP,  Ksenia Thrasher, DNP, Soraya Mckeon, CNP, Sabrina Grace, CNP, Aniya Macdonald, CNP, Bridgette Cohen, CNP, Yamilex Spence, PA-C, Olinda Loredo, CNP, Shavon Mina, CNP,  Xiomara Basurto, CNP, Tayler Rosario, CNP, Shon Anand, PA-C, Cortney Wellington, PA-C, Federica Maza, CNP,        Demetra Bloom, CNS, Yoly Coleman, CNP, Jenny Fernandez, CNP         [facility]   IN-PATIENT SERVICE   [facility]    Progress Note    6/28/2025    3:54 PM    Name:   Sunita Watts  MRN:     [id number]     Acct:      [id number]   Room:   2005/2005-01  IP Day:  4  Admit Date:  6/24/2025 10:16 PM    PCP:   Kita Newton APRN - CNP  Code Status:  Full Code    Subjective:     C/C: No chief complaint on file.    Interval History Status: not changed.     Patient seen examined at bedside. Feeling well.  No acute issues overnight.  Respiratory status continues to improve.  BUN/creatinine however remain persistently elevated.  Still having good urine output.  Blood pressure stable.  Underwent right heart catheterization yesterday.    Brief History:       Sunita Watts is a 70 y.o.  female w/ CAD s/p PCI LAD 2023 ([facility] 03/2025 patent stent, nonobst dz) w/ cor pulmonale

## 2025-06-28 NOTE — PROGRESS NOTES
PULMONARY & CRITICAL CARE MEDICINE PROGRESS NOTE     Patient:  Sunita Watts  MRN: [id number]  Admit date: 6/24/2025  Primary Care Physician: Kita Newton, APRN - CNP  Consulting Physician: Cornelius Holland MD  CODE Status: Full Code   LOS: 4    HISTORY     CHIEF COMPLAINT/REASON FOR CONSULT:    Acute hypoxic respiratory failure likely on chronic/pulmonary hypertension/COPD.    HISTORY OF PRESENT ILLNESS:  The patient is a 70 y.o. female she has history of COPD severity not known no previous pulmonary function test available Long history of smoking until recently at least 40-pack-year, history of coronary artery disease status post PCI in 2023 last cardiac catheterization was March 2025, chronic diastolic heart failure.  According to patient she has been having gradual increasing shortness of breath for few months but especially in the last 1 week or so.  According patient she does not have cough with sputum production no fever no chills no diarrhea or nausea feels slight abdominal bloating but does complain of shortness of breath without change in his sputum or wheezing.  He came to emergency room and she was started on BiPAP initially and then after that BiPAP has been off and she is placed on high flow nasal cannula.    Her initial labs shows a BUN of 41 creatinine 1.5 bicarbonate was 33 proBNP was greater than 10,000 AST was 827 ALT was 958 total bili 2.2 alkaline phosphatase 459 WBC 12.3 hemoglobin 14.5 hematocrit 48.9 and platelet count is 283.  INR was 1.4 respiratory panel is negative flu is negative urinalysis shows WBC 20-50,  Initial blood gas was 7.4 2/60/36/39 bicarbonate.  CTA chest shows small subsegmental minimal lower lung pulm embolism per radiology no significant pleural effusion or consolidation was seen.    Her last echocardiogram on 12/27/2024 shows moderately reduced RV function RV was severely dilated moderate tricuspid regurgitation and severely elevated RVSP.      INTERVAL

## 2025-06-29 LAB
ALBUMIN SERPL-MCNC: 3.4 G/DL (ref 3.5–5.2)
ALBUMIN/GLOB SERPL: 1.5 {RATIO} (ref 1–2.5)
ALP SERPL-CCNC: 465 U/L (ref 35–104)
ALT SERPL-CCNC: 387 U/L (ref 10–35)
ANION GAP SERPL CALCULATED.3IONS-SCNC: 12 MMOL/L (ref 9–16)
ANTI-XA UNFRAC HEPARIN: 0.17 IU/L
ANTI-XA UNFRAC HEPARIN: 0.26 IU/L
AST SERPL-CCNC: 105 U/L (ref 10–35)
BILIRUB SERPL-MCNC: 0.5 MG/DL (ref 0–1.2)
BUN SERPL-MCNC: 58 MG/DL (ref 8–23)
CALCIUM SERPL-MCNC: 8.6 MG/DL (ref 8.6–10.4)
CHLORIDE SERPL-SCNC: 96 MMOL/L (ref 98–107)
CO2 SERPL-SCNC: 30 MMOL/L (ref 20–31)
CREAT SERPL-MCNC: 2.8 MG/DL (ref 0.6–0.9)
ERYTHROCYTE [DISTWIDTH] IN BLOOD BY AUTOMATED COUNT: 16.1 % (ref 11.8–14.4)
GFR, ESTIMATED: 17 ML/MIN/1.73M2
GLUCOSE SERPL-MCNC: 105 MG/DL (ref 74–99)
HCT VFR BLD AUTO: 38.8 % (ref 36.3–47.1)
HGB BLD-MCNC: 11.5 G/DL (ref 11.9–15.1)
INR PPP: 1.2
MCH RBC QN AUTO: 25.1 PG (ref 25.2–33.5)
MCHC RBC AUTO-ENTMCNC: 29.6 G/DL (ref 28.4–34.8)
MCV RBC AUTO: 84.7 FL (ref 82.6–102.9)
MICROORGANISM SPEC CULT: NORMAL
MICROORGANISM SPEC CULT: NORMAL
NRBC BLD-RTO: 0.2 PER 100 WBC
PLATELET # BLD AUTO: 199 K/UL (ref 138–453)
PMV BLD AUTO: 11.3 FL (ref 8.1–13.5)
POTASSIUM SERPL-SCNC: 4 MMOL/L (ref 3.7–5.3)
PROT SERPL-MCNC: 5.6 G/DL (ref 6.6–8.7)
PROTHROMBIN TIME: 15.1 SEC (ref 11.7–14.9)
RBC # BLD AUTO: 4.58 M/UL (ref 3.95–5.11)
SERVICE CMNT-IMP: NORMAL
SERVICE CMNT-IMP: NORMAL
SODIUM SERPL-SCNC: 138 MMOL/L (ref 136–145)
SPECIMEN DESCRIPTION: NORMAL
SPECIMEN DESCRIPTION: NORMAL
WBC OTHER # BLD: 12.9 K/UL (ref 3.5–11.3)

## 2025-06-29 PROCEDURE — 6370000000 HC RX 637 (ALT 250 FOR IP): Performed by: INTERNAL MEDICINE

## 2025-06-29 PROCEDURE — 2700000000 HC OXYGEN THERAPY PER DAY

## 2025-06-29 PROCEDURE — 85520 HEPARIN ASSAY: CPT

## 2025-06-29 PROCEDURE — 6360000002 HC RX W HCPCS

## 2025-06-29 PROCEDURE — 94761 N-INVAS EAR/PLS OXIMETRY MLT: CPT

## 2025-06-29 PROCEDURE — 99233 SBSQ HOSP IP/OBS HIGH 50: CPT | Performed by: NURSE PRACTITIONER

## 2025-06-29 PROCEDURE — 2060000000 HC ICU INTERMEDIATE R&B

## 2025-06-29 PROCEDURE — 85027 COMPLETE CBC AUTOMATED: CPT

## 2025-06-29 PROCEDURE — 36415 COLL VENOUS BLD VENIPUNCTURE: CPT

## 2025-06-29 PROCEDURE — 94640 AIRWAY INHALATION TREATMENT: CPT

## 2025-06-29 PROCEDURE — 80053 COMPREHEN METABOLIC PANEL: CPT

## 2025-06-29 PROCEDURE — 99233 SBSQ HOSP IP/OBS HIGH 50: CPT | Performed by: INTERNAL MEDICINE

## 2025-06-29 PROCEDURE — 2500000003 HC RX 250 WO HCPCS

## 2025-06-29 PROCEDURE — 6370000000 HC RX 637 (ALT 250 FOR IP): Performed by: STUDENT IN AN ORGANIZED HEALTH CARE EDUCATION/TRAINING PROGRAM

## 2025-06-29 PROCEDURE — 99232 SBSQ HOSP IP/OBS MODERATE 35: CPT | Performed by: STUDENT IN AN ORGANIZED HEALTH CARE EDUCATION/TRAINING PROGRAM

## 2025-06-29 PROCEDURE — 85610 PROTHROMBIN TIME: CPT

## 2025-06-29 PROCEDURE — 94760 N-INVAS EAR/PLS OXIMETRY 1: CPT

## 2025-06-29 PROCEDURE — 99231 SBSQ HOSP IP/OBS SF/LOW 25: CPT | Performed by: INTERNAL MEDICINE

## 2025-06-29 RX ORDER — POLYETHYLENE GLYCOL 3350 17 G/17G
17 POWDER, FOR SOLUTION ORAL DAILY
Status: DISCONTINUED | OUTPATIENT
Start: 2025-06-29 | End: 2025-07-02 | Stop reason: HOSPADM

## 2025-06-29 RX ORDER — SENNA AND DOCUSATE SODIUM 50; 8.6 MG/1; MG/1
2 TABLET, FILM COATED ORAL 2 TIMES DAILY PRN
Status: DISCONTINUED | OUTPATIENT
Start: 2025-06-29 | End: 2025-07-02 | Stop reason: HOSPADM

## 2025-06-29 RX ORDER — FUROSEMIDE 40 MG/1
80 TABLET ORAL 2 TIMES DAILY
Status: DISCONTINUED | OUTPATIENT
Start: 2025-06-29 | End: 2025-07-01

## 2025-06-29 RX ADMIN — METOPROLOL TARTRATE 25 MG: 25 TABLET, FILM COATED ORAL at 21:05

## 2025-06-29 RX ADMIN — IPRATROPIUM BROMIDE AND ALBUTEROL SULFATE 1 DOSE: .5; 2.5 SOLUTION RESPIRATORY (INHALATION) at 19:47

## 2025-06-29 RX ADMIN — FUROSEMIDE 80 MG: 40 TABLET ORAL at 17:28

## 2025-06-29 RX ADMIN — HEPARIN SODIUM 1600 UNITS: 1000 INJECTION INTRAVENOUS; SUBCUTANEOUS at 21:06

## 2025-06-29 RX ADMIN — FUROSEMIDE 80 MG: 40 TABLET ORAL at 08:01

## 2025-06-29 RX ADMIN — BUDESONIDE AND FORMOTEROL FUMARATE DIHYDRATE 2 PUFF: 160; 4.5 AEROSOL RESPIRATORY (INHALATION) at 19:47

## 2025-06-29 RX ADMIN — PREDNISONE 30 MG: 20 TABLET ORAL at 08:01

## 2025-06-29 RX ADMIN — BUDESONIDE AND FORMOTEROL FUMARATE DIHYDRATE 2 PUFF: 160; 4.5 AEROSOL RESPIRATORY (INHALATION) at 09:05

## 2025-06-29 RX ADMIN — IPRATROPIUM BROMIDE AND ALBUTEROL SULFATE 1 DOSE: .5; 2.5 SOLUTION RESPIRATORY (INHALATION) at 08:58

## 2025-06-29 RX ADMIN — SODIUM CHLORIDE, PRESERVATIVE FREE 10 ML: 5 INJECTION INTRAVENOUS at 12:18

## 2025-06-29 RX ADMIN — WATER 1000 MG: 1 INJECTION INTRAMUSCULAR; INTRAVENOUS; SUBCUTANEOUS at 12:17

## 2025-06-29 RX ADMIN — POLYETHYLENE GLYCOL 3350 17 G: 17 POWDER, FOR SOLUTION ORAL at 10:05

## 2025-06-29 RX ADMIN — METOPROLOL TARTRATE 25 MG: 25 TABLET, FILM COATED ORAL at 08:01

## 2025-06-29 ASSESSMENT — PAIN SCALES - GENERAL: PAINLEVEL_OUTOF10: 0

## 2025-06-29 NOTE — PROGRESS NOTES
[facility]  [address], [place] [address]  Phone: [phone]    Fax: [phone]  Dr. Abhijit Lipscomb, CANDIE Mclean NP      SUBJECTIVE      Seen and examined at bedside  Feels edema about the same as yesterday  On 2L NC saturating 96%  1.4L in and 1.9L UO yesterday         History:  This is a 70 y.o. female with PMHx of COPD ex tobacco use, CAD, cor pulmonale, depression who presents with progressive shortness of breath and fatigue.  She has been feeling dyspneic just walking 20 feet over the last few months but this has been worsening in the last 1 to 2 weeks associated with a nonproductive cough.  She has been diagnosed with a PE and is on heparin drip.  She denies any history of NSAID use or any kidney problems.  She denies nephrolithiasis.  Denies any recent infections. No supplement/OTC medication use aside from tylenol.  Baseline creatinine is 0.6-0.7 and is up to 1.7 mg/dL on the day of the consult. UPCR is 7.08 with no prior available for comparison.  She reports being up to date on her mammogram, no abnormal findings in the past. She has not had a colonoscopy or CT chest. CT a/p with IV contrast done on  showing c/f lytic lesions especially near L3. CT chest for PE protocol showed scattered small PE.  GI has been consulted for elevated LFTs.  OBJECTIVE      CURRENT TEMPERATURE:  Temp: 97.7 °F (36.5 °C)  MAXIMUM TEMPERATURE OVER 24HRS:  Temp (24hrs), Av.9 °F (36.6 °C), Min:97.4 °F (36.3 °C), Max:98.2 °F (36.8 °C)    CURRENT RESPIRATORY RATE:  Respirations: 10  CURRENT PULSE:  Pulse: 67  CURRENT BLOOD PRESSURE:  BP: (!) 142/66  24HR BLOOD PRESSURE RANGE:  Systolic (24hrs), Av , Min:112 , Max:142   ; Diastolic (24hrs), Av, Min:60, Max:80    24HR INTAKE/OUTPUT:    Intake/Output Summary (Last 24 hours) at 2025 1139  Last data filed at 2025 1022  Gross per 24 hour  06/25/2025 09:07 AM     C3: No results found for: \"C3\"  C4:   Lab Results   Component Value Date    C4 21 06/25/2025     MPO ANCA:   Lab Results   Component Value Date/Time    MPO <0.3 06/25/2025 09:07 AM    .  PR3 ANCA:    Lab Results   Component Value Date/Time    PR3 <0.7 06/25/2025 09:07 AM     URINE SODIUM:  No components found for: \"BOB\"   URINE CREATININE:  No results found for: \"LABCREA\"  URINE EOSINOPHILS: No results found for: \"UREO\"  URINE PROTEIN:  No results found for: \"TPU\"  URINALYSIS:  U/A:   Lab Results   Component Value Date/Time    NITRU NEGATIVE 06/25/2025 01:24 AM    COLORU Yellow 06/25/2025 01:24 AM    PHUR 6.0 06/25/2025 01:24 AM    WBCUA 20 TO 50 06/25/2025 01:24 AM    RBCUA 5 TO 10 06/25/2025 01:24 AM    BACTERIA None 06/25/2025 01:24 AM    LEUKOCYTESUR NEGATIVE 06/25/2025 01:24 AM    UROBILINOGEN 1.0 06/25/2025 01:24 AM    BILIRUBINUR NEGATIVE 06/25/2025 01:24 AM    GLUCOSEU NEGATIVE 06/25/2025 01:24 AM    KETUA NEGATIVE 06/25/2025 01:24 AM     ANTIGBM:  Lab Results   Component Value Date/Time    GBMABIGG <1.9 06/25/2025 09:07 AM         RADIOLOGY      Reviewed as available.      ASSESSMENT      1. Nonoliguric ALPA likely hemodynamic from low bp and severe pulmonary hypertension     2. Nephrotic range proteinuria without nephrotic syndrome  Albumin normal   UA with blood and wbc but many epi cells  HIV, hepatitis panel negative  Ddx includes multiple myeloma, minimal change disease, FSGS, amyloidosis     3. PE without right heart strain    4.  Mixed metabolic alkalosis and chronic hypercapnia  5. Severe pulmonary hypertension, acute on chronic HFpEF, improved with IV Lasix  6.  Iron deficiency-hemoglobin is likely falsely high due to chronic hypoxia, anemia, ALPA, proteinuria raises concern for multiple myeloma  6. COPD, AHRF, pulmonary hypertension    PLAN      1.  Follow-up UPEP and serum immunofixation, PLA2R  2.  Plan for renal biopsy this week, have reconsulted IR let them know patient

## 2025-06-29 NOTE — PLAN OF CARE
Problem: Discharge Planning  Goal: Discharge to home or other facility with appropriate resources  Outcome: Progressing     Problem: Safety - Adult  Goal: Free from fall injury  Outcome: Progressing     Problem: Respiratory - Adult  Goal: Achieves optimal ventilation and oxygenation  Outcome: Progressing  Flowsheets (Taken 6/29/2025 4141 by Dayana Armando RCP)  Achieves optimal ventilation and oxygenation:   Respiratory therapy support as indicated   Assess and instruct to report shortness of breath or any respiratory difficulty   Assess the need for suctioning and aspirate as needed   Encourage broncho-pulmonary hygiene including cough, deep breathe, incentive spirometry   Initiate smoking cessation protocol as indicated   Oxygen supplementation based on oxygen saturation or arterial blood gases   Position to facilitate oxygenation and minimize respiratory effort   Assess for changes in mentation and behavior   Assess for changes in respiratory status     Problem: ABCDS Injury Assessment  Goal: Absence of physical injury  Outcome: Progressing     Problem: Skin/Tissue Integrity  Goal: Skin integrity remains intact  Outcome: Progressing     Problem: Chronic Conditions and Co-morbidities  Goal: Patient's chronic conditions and co-morbidity symptoms are monitored and maintained or improved  Outcome: Progressing

## 2025-06-29 NOTE — PROGRESS NOTES
[facility]   Progress Note                   Date:   6/29/2025  Patient name: Sunita Watts  Date of admission:  6/24/2025 10:16 PM  MRN:   [id number]  YOB: 1955  PCP: Kita Newton, BRET - CNP    Reason for Admission: Acute on chronic respiratory failure (HCC) [J96.20]  NSTEMI (non-ST elevated myocardial infarction) ([facility]) [I21.4]  ALPA (acute kidney injury) [N17.9]  PE (pulmonary thromboembolism) ([facility]) [I26.99]    Subjective:       Clinical Changes / Abnormalities: Pt seen and examined in the room.  Patient resting in the chair. Pt denies any CP. Labs, vitals and tele reviewed- SR   Heparin drip infusing.     Medications:   Scheduled Meds:   polyethylene glycol  17 g Oral Daily    predniSONE  30 mg Oral Daily    furosemide  80 mg Oral Daily    ipratropium 0.5 mg-albuterol 2.5 mg  1 Dose Inhalation BID RT    budesonide-formoterol  2 puff Inhalation BID RT    metoprolol tartrate  25 mg Oral BID    [Held by provider] rosuvastatin  20 mg Oral Daily    sodium chloride flush  5-40 mL IntraVENous 2 times per day    cefTRIAXone (ROCEPHIN) IV  1,000 mg IntraVENous Q24H     Continuous Infusions:   sodium chloride      heparin (PORCINE) Infusion 13 Units/kg/hr (06/28/25 0848)     CBC:   Recent Labs     06/27/25  0623 06/28/25  0811 06/29/25  0748   WBC 11.5* 12.1* 12.9*   HGB 12.1 11.7* 11.5*   PLT See Reflexed IPF Result 216 [id number]     BMP:    Recent Labs     06/27/25  0623 06/28/25  0811 06/29/25  0748   * 137 138   K 3.8 4.6 4.0   CL 90* 94* 96*   CO2 29 30 30   BUN 62* 62* 58*   CREATININE 3.0* 3.0* 2.8*   GLUCOSE 137* 107* 105*     Hepatic:   Recent Labs     06/27/25  0623 06/28/25  0811 06/29/25  0748   * 179* 105*   * 506* 387*   BILITOT 0.6 0.7 0.5   ALKPHOS 529* 541* 465*     Troponin:   No results for input(s): \"TROPHS\" in the last 72 hours.    BNP: No results for input(s): \"BNP\" in the last 72 hours.  Lipids:   No results for input(s): \"CHOL\", \"HDL\" in the

## 2025-06-29 NOTE — PROGRESS NOTES
PULMONARY & CRITICAL CARE MEDICINE PROGRESS NOTE     Patient:  Sunita Watts  MRN: [id number]  Admit date: 6/24/2025  Primary Care Physician: Kita Newton, APRN - CNP  Consulting Physician: Cornelius Holland MD  CODE Status: Full Code   LOS: 5    HISTORY     CHIEF COMPLAINT/REASON FOR CONSULT:    Acute hypoxic respiratory failure likely on chronic/pulmonary hypertension/COPD.    HISTORY OF PRESENT ILLNESS:  The patient is a 70 y.o. female she has history of COPD severity not known no previous pulmonary function test available Long history of smoking until recently at least 40-pack-year, history of coronary artery disease status post PCI in 2023 last cardiac catheterization was March 2025, chronic diastolic heart failure.  According to patient she has been having gradual increasing shortness of breath for few months but especially in the last 1 week or so.  According patient she does not have cough with sputum production no fever no chills no diarrhea or nausea feels slight abdominal bloating but does complain of shortness of breath without change in his sputum or wheezing.  He came to emergency room and she was started on BiPAP initially and then after that BiPAP has been off and she is placed on high flow nasal cannula.    Her initial labs shows a BUN of 41 creatinine 1.5 bicarbonate was 33 proBNP was greater than 10,000 AST was 827 ALT was 958 total bili 2.2 alkaline phosphatase 459 WBC 12.3 hemoglobin 14.5 hematocrit 48.9 and platelet count is 283.  INR was 1.4 respiratory panel is negative flu is negative urinalysis shows WBC 20-50,  Initial blood gas was 7.4 2/60/36/39 bicarbonate.  CTA chest shows small subsegmental minimal lower lung pulm embolism per radiology no significant pleural effusion or consolidation was seen.    Her last echocardiogram on 12/27/2024 shows moderately reduced RV function RV was severely dilated moderate tricuspid regurgitation and severely elevated RVSP.      INTERVAL  probable mild steatosis.  No marked steatosis   suspected.         Final   1. Unremarkable right upper quadrant ultrasound.   2. Unremarkable Doppler assessment of the hepatic vessels, as above.         FL MODIFIED BARIUM SWALLOW W VIDEO   Final Result   Swallowing mechanism grossly within normal limits without evidence of   aspiration.      Please see separate speech pathology report for full discussion of findings   and recommendations.         Vascular duplex lower extremity venous bilateral   Final Result        ECHOCARDIOGRAM  Results for orders placed during the hospital encounter of 06/24/25    Echo (TTE) complete (PRN contrast/bubble/strain/3D)    Interpretation Summary    Left Ventricle: Normal left ventricular systolic function. EF by visual approximation is 55%. Left ventricle size is normal. Normal wall thickness. Normal wall motion. Abnormal diastolic function.    Right Ventricle: Right ventricle is severely dilated. Reduced systolic function.    Mitral Valve: Mild regurgitation.    Tricuspid Valve: Moderate to severe regurgitation. Severely elevated RVSP, consistent with severe pulmonary hypertension. RVSP is 87 mmHg.    Right Atrium: Right atrium is severely dilated.    Image quality is good.    No results found for this or any previous visit.      CARDIAC CATH  Results for orders placed during the hospital encounter of 06/24/25    Cardiac procedure    Conclusion    Ultrasound guided 6F right IJ venous access.    Elevated right sided filling pressure    Moderately elevaed pulmonary pressures.    Normal cardiac output.    Negative vasoreactivity challenge test with inhaled nitric oxide.        ASSESSMENT AND PLAN     Assessment:    Acute hypoxic hypercapnic respiratory failure.  Minimal scattered subsegmental lower pulmonary embolism unlikely the cause of her acute decompensation  Severe pulmonary hypertension group group 3/group 2.  Less likely but possible CTEPH  COPD severity is not known.  Possible

## 2025-06-29 NOTE — PROGRESS NOTES
[facility]   Gastroenterology Progress Note    Sunita Watts is a 70 y.o. female patient.  Hospitalization Day:5      Chief consult reason:   Abnormal LFTs    Subjective:  Seen and examined, no acute events overnight  Slight improvement in LFTs today with alk phos 465, , , T. bili normal at 0.5    VITALS:  BP (!) 120/58   Pulse 68   Temp 97.8 °F (36.6 °C) (Oral)   Resp 18   Ht 1.549 m (5' 0.98\")   Wt 59.5 kg (131 lb 1.6 oz)   SpO2 95%   BMI 24.78 kg/m²   TEMPERATURE:  Current - Temp: 97.8 °F (36.6 °C); Max - Temp  Av.8 °F (36.6 °C)  Min: 97.4 °F (36.3 °C)  Max: 98.2 °F (36.8 °C)    Physical Assessment:  General appearance:  alert, cooperative and no distress  Mental Status:  oriented to person, place and time and normal affect  Lungs:  clear to auscultation bilaterally, normal effort  Heart:  regular rate and rhythm, no murmur  Abdomen:  soft, nontender, nondistended, normal bowel sounds, no masses, hepatomegaly, splenomegaly  Extremities:  no edema, redness, tenderness in the calves  Skin:  no gross lesions, rashes, induration    Data Review:    Labs and Imaging:       CBC:  Recent Labs     25  0811 25  0748   WBC 11.5* 12.1* 12.9*   HGB 12.1 11.7* 11.5*   MCV 85.4 85.4 84.7   RDW 15.8* 15.8* 16.1*   PLT See Reflexed IPF Result 216 199       ANEMIA STUDIES:  No results for input(s): \"TIBC\", \"FERRITIN\", \"VRPHUCNG54\", \"FOLATE\", \"OCCULTBLD\" in the last 72 hours.    Invalid input(s): \"LABIRON\"    BMP:  Recent Labs     25  0623 25  0811 25  0748   * 137 138   K 3.8 4.6 4.0   CL 90* 94* 96*   CO2 29 30 30   BUN 62* 62* 58*   CREATININE 3.0* 3.0* 2.8*   GLUCOSE 137* 107* 105*   CALCIUM 8.5* 8.8 8.6       LFTS:  Recent Labs     25  0623 25  0811 25  0748   ALKPHOS 529* 541* 465*   * 506* 387*   * 179* 105*   BILITOT 0.6 0.7 0.5   BILIDIR 0.3*  --   --        Other pertinent labs:  HIV

## 2025-06-29 NOTE — PROGRESS NOTES
Spoke with patient about wearing Bipap therapy overnight and during naps. Educated  about the benefits of the therapy.

## 2025-06-29 NOTE — PLAN OF CARE
Problem: Discharge Planning  Goal: Discharge to home or other facility with appropriate resources  6/28/2025 1655 by Suma Rod, RN  Outcome: Progressing     Problem: Safety - Adult  Goal: Free from fall injury  6/28/2025 1655 by Suma Rod, RN  Outcome: Progressing     Problem: ABCDS Injury Assessment  Goal: Absence of physical injury  6/28/2025 1655 by Suma Rod, RN  Outcome: Progressing     Problem: Skin/Tissue Integrity  Goal: Skin integrity remains intact  Description: 1.  Monitor for areas of redness and/or skin breakdown  2.  Assess vascular access sites hourly  3.  Every 4-6 hours minimum:  Change oxygen saturation probe site  4.  Every 4-6 hours:  If on nasal continuous positive airway pressure, respiratory therapy assess nares and determine need for appliance change or resting period  6/28/2025 1655 by Suma Rod, RN  Outcome: Progressing     Problem: Chronic Conditions and Co-morbidities  Goal: Patient's chronic conditions and co-morbidity symptoms are monitored and maintained or improved  6/28/2025 1655 by Suma Rod, RN  Outcome: Progressing

## 2025-06-29 NOTE — PROGRESS NOTES
[facility]  Office: [phone]  Aleksandr Frias, DO, Nain Gooden, DO, Papo Hurst DO, Benji Howard, DO, Sandro Contreras MD, Shaila Gray MD, Prince Lewis MD, Nini Song MD,  Rao Khalil MD, Victor Manuel Herman MD, Cornelius Holland MD,  Erasto Lezama DO, Chela Slaughter MD, Alonzo Go MD, Sean Frias DO, Lolly Gomes MD,  Phillip Scanlon DO, Macey Perkins MD, Filomena Gonzalez MD, Morales Monzon MD,  Adolfo Garza MD, Nelly Bush MD, Danielle Dee MD, Lamine Ramos MD, Conor Rivas MD, Abhijeet Harkins MD, Eddy Soto, DO, Komal Samuels MD, Justin Cooper DO, Yuan Foster MD, Erasto Shin MD, Mohsin Reza, MD, Dionicio Garg MD, Shirley Waterhouse, CNP,  Sole Grimes, CNP, Eddy Reyes, CNP,  Ksenia Thrasher, MARKEL, Soraya Mckeon, CNP, Sabrina Grace, CNP, Aniya Macdonald, CNP, Bridgette Cohen, CNP, Yamilex Spence, PA-C, Olinda Loredo, CNP, Shavon Mina, CNP,  Xiomara Basurto, CNP, Tayler Rosario, CNP, Shon Anand, PA-C, Cortney Wellington, PA-C, Federica Maza, CNP,        Demetra Bloom, CNS, Yoly Coleman, CNP, Jenny Fernandez, CNP         [facility]   IN-PATIENT SERVICE   [facility]    Progress Note    6/29/2025    3:07 PM    Name:   Sunita Watts  MRN:     [id number]     Acct:      [id number]   Room:   2005/2005-01  IP Day:  5  Admit Date:  6/24/2025 10:16 PM    PCP:   Kita Newton APRN - CNP  Code Status:  Full Code    Subjective:     C/C: No chief complaint on file.    Interval History Status: not changed.     Patient seen examined at bedside. Feeling well.  No acute issues overnight.  Respiratory status continues to improve.  BUN/creatinine however remain persistently elevated.     Brief History:       Sunita Watts is a 70 y.o.  female w/ CAD s/p PCI LAD 2023 ([facility] 03/2025 patent stent, nonobst dz) w/ cor pulmonale (echo 12/2024 +RV dysfunction w/ EF 55%, severe PTHN, mod TR; CTA PE 01/2025 neg PE) who has been  Doppler assessment of the hepatic vessels, as above.     US RENAL COMPLETE  Result Date: 6/25/2025  1. Findings compatible with medical renal disease.  No hydronephrosis. 2. Poorly visualized urinary bladder with a Gottlieb catheter in place.     FL MODIFIED BARIUM SWALLOW W VIDEO  Result Date: 6/25/2025  Swallowing mechanism grossly within normal limits without evidence of aspiration. Please see separate speech pathology report for full discussion of findings and recommendations.     CT CHEST PULMONARY EMBOLISM W CONTRAST  Result Date: 6/24/2025  1. Scattered small peripheral acute pulmonary emboli.  No acute right heart strain but more dilated right atrium.  Follow-up cardiology consultation suggested. 2. No acute pulmonary process.  Mucus/debris in the trachea and scattered peripheral small bronchi.  Correlate for some degree aspiration/mucous plugging. 3. Hepatic steatosis. 4. Small amount of free fluid in the posterior pelvis. 5. Prominent uterus and adnexal structures; consider nonemergent US follow-up. 6. Scattered vertebral body and probably iliac bone lucencies, best seen L3. Findings may reflect osteopenia but metastases or myeloma in the differential.  Correlate clinically and consider MRI if relevant. 7. Additional findings, as above. Findings were discussed with EDILBERTO HERNANDEZ at 4:19 pm on 6/24/2025. RECOMMENDATIONS: As above.     CT ABDOMEN PELVIS W IV CONTRAST Additional Contrast? None  Result Date: 6/24/2025  1. Scattered small peripheral acute pulmonary emboli.  No acute right heart strain but more dilated right atrium.  Follow-up cardiology consultation suggested. 2. No acute pulmonary process.  Mucus/debris in the trachea and scattered peripheral small bronchi.  Correlate for some degree aspiration/mucous plugging. 3. Hepatic steatosis. 4. Small amount of free fluid in the posterior pelvis. 5. Prominent uterus and adnexal structures; consider nonemergent US follow-up. 6. Scattered vertebral body

## 2025-06-30 LAB
ALBUMIN PERCENT: ABNORMAL % (ref 56–66)
ALBUMIN SERPL-MCNC: 3.5 G/DL (ref 3.5–5.2)
ALBUMIN SERPL-MCNC: ABNORMAL G/DL (ref 3.2–5.2)
ALBUMIN/GLOB SERPL: 1.5 {RATIO} (ref 1–2.5)
ALP SERPL-CCNC: 450 U/L (ref 35–104)
ALPHA 2 PERCENT: ABNORMAL % (ref 7–12)
ALPHA1 GLOB SERPL ELPH-MCNC: ABNORMAL % (ref 3–5)
ALPHA1 GLOB SERPL ELPH-MCNC: ABNORMAL G/DL (ref 0.1–0.4)
ALPHA2 GLOB SERPL ELPH-MCNC: ABNORMAL G/DL (ref 0.5–0.9)
ALT SERPL-CCNC: 331 U/L (ref 10–35)
ANION GAP SERPL CALCULATED.3IONS-SCNC: 12 MMOL/L (ref 9–16)
ANTI-XA UNFRAC HEPARIN: 0.27 IU/L
ANTI-XA UNFRAC HEPARIN: 0.36 IU/L
ANTI-XA UNFRAC HEPARIN: 0.43 IU/L
AST SERPL-CCNC: 85 U/L (ref 10–35)
B-GLOBULIN SERPL ELPH-MCNC: ABNORMAL % (ref 8–13)
B-GLOBULIN SERPL ELPH-MCNC: ABNORMAL G/DL (ref 0.7–1.4)
BILIRUB SERPL-MCNC: 0.5 MG/DL (ref 0–1.2)
BUN SERPL-MCNC: 56 MG/DL (ref 8–23)
CALCIUM SERPL-MCNC: 8.8 MG/DL (ref 8.6–10.4)
CHLORIDE SERPL-SCNC: 94 MMOL/L (ref 98–107)
CO2 SERPL-SCNC: 32 MMOL/L (ref 20–31)
CREAT SERPL-MCNC: 2.7 MG/DL (ref 0.6–0.9)
CREAT UR-MCNC: 18.6 MG/DL (ref 28–217)
EBV VCA IGM SER-ACNC: 37 U/ML
ERYTHROCYTE [DISTWIDTH] IN BLOOD BY AUTOMATED COUNT: 16.8 % (ref 11.8–14.4)
FREE KAPPA/LAMBDA RATIO: 1.36 (ref 0.22–1.74)
GAMMA GLOB SERPL ELPH-MCNC: ABNORMAL G/DL (ref 0.5–1.5)
GAMMA GLOBULIN %: ABNORMAL % (ref 11–19)
GFR, ESTIMATED: 18 ML/MIN/1.73M2
GLUCOSE SERPL-MCNC: 96 MG/DL (ref 74–99)
HCT VFR BLD AUTO: 40.5 % (ref 36.3–47.1)
HGB BLD-MCNC: 11.9 G/DL (ref 11.9–15.1)
IGA SERPL-MCNC: 225 MG/DL (ref 70–400)
IGG SERPL-MCNC: 833 MG/DL (ref 700–1600)
IGM SERPL-MCNC: 84 MG/DL (ref 40–230)
ITYP INTERPRETATION: NORMAL
ITYP INTERPRETATION: NORMAL
KAPPA LC FREE SER-MCNC: 29.6 MG/L
LAMBDA LC FREE SERPL-MCNC: 21.8 MG/L (ref 4.2–27.7)
MCH RBC QN AUTO: 24.9 PG (ref 25.2–33.5)
MCHC RBC AUTO-ENTMCNC: 29.4 G/DL (ref 28.4–34.8)
MCV RBC AUTO: 84.7 FL (ref 82.6–102.9)
NRBC BLD-RTO: 0.2 PER 100 WBC
PATH REV: NORMAL
PATHOLOGIST REVIEW: NORMAL
PLATELET # BLD AUTO: 227 K/UL (ref 138–453)
PMV BLD AUTO: 11.6 FL (ref 8.1–13.5)
POTASSIUM SERPL-SCNC: 3.7 MMOL/L (ref 3.7–5.3)
PROT SERPL-MCNC: 5.9 G/DL (ref 6.6–8.7)
PROT SERPL-MCNC: 6.2 G/DL (ref 6.6–8.7)
RBC # BLD AUTO: 4.78 M/UL (ref 3.95–5.11)
SODIUM SERPL-SCNC: 138 MMOL/L (ref 136–145)
SPECIMEN TYPE: NORMAL
SPECIMEN VOL UR: NORMAL ML
TOTAL PROT. SUM,%: ABNORMAL % (ref 98–102)
TOTAL PROT. SUM: ABNORMAL G/DL (ref 5.7–8.2)
TOTAL PROTEIN, URINE: 25 MG/DL
TOTAL PROTEIN, URINE: 496 MG/DL
URINE TOTAL PROTEIN CREATININE RATIO: 1.34 (ref 0–0.2)
WBC OTHER # BLD: 13.2 K/UL (ref 3.5–11.3)

## 2025-06-30 PROCEDURE — 6360000002 HC RX W HCPCS

## 2025-06-30 PROCEDURE — 99233 SBSQ HOSP IP/OBS HIGH 50: CPT | Performed by: SURGERY

## 2025-06-30 PROCEDURE — 99232 SBSQ HOSP IP/OBS MODERATE 35: CPT | Performed by: STUDENT IN AN ORGANIZED HEALTH CARE EDUCATION/TRAINING PROGRAM

## 2025-06-30 PROCEDURE — 36415 COLL VENOUS BLD VENIPUNCTURE: CPT

## 2025-06-30 PROCEDURE — 2700000000 HC OXYGEN THERAPY PER DAY

## 2025-06-30 PROCEDURE — 94640 AIRWAY INHALATION TREATMENT: CPT

## 2025-06-30 PROCEDURE — 6370000000 HC RX 637 (ALT 250 FOR IP): Performed by: STUDENT IN AN ORGANIZED HEALTH CARE EDUCATION/TRAINING PROGRAM

## 2025-06-30 PROCEDURE — 99223 1ST HOSP IP/OBS HIGH 75: CPT | Performed by: INTERNAL MEDICINE

## 2025-06-30 PROCEDURE — 2060000000 HC ICU INTERMEDIATE R&B

## 2025-06-30 PROCEDURE — 83521 IG LIGHT CHAINS FREE EACH: CPT

## 2025-06-30 PROCEDURE — 82570 ASSAY OF URINE CREATININE: CPT

## 2025-06-30 PROCEDURE — 85520 HEPARIN ASSAY: CPT

## 2025-06-30 PROCEDURE — 84156 ASSAY OF PROTEIN URINE: CPT

## 2025-06-30 PROCEDURE — 6370000000 HC RX 637 (ALT 250 FOR IP): Performed by: INTERNAL MEDICINE

## 2025-06-30 PROCEDURE — 94761 N-INVAS EAR/PLS OXIMETRY MLT: CPT

## 2025-06-30 PROCEDURE — 85027 COMPLETE CBC AUTOMATED: CPT

## 2025-06-30 PROCEDURE — 99232 SBSQ HOSP IP/OBS MODERATE 35: CPT | Performed by: INTERNAL MEDICINE

## 2025-06-30 PROCEDURE — 99233 SBSQ HOSP IP/OBS HIGH 50: CPT | Performed by: INTERNAL MEDICINE

## 2025-06-30 PROCEDURE — 2500000003 HC RX 250 WO HCPCS

## 2025-06-30 PROCEDURE — 80053 COMPREHEN METABOLIC PANEL: CPT

## 2025-06-30 PROCEDURE — 86334 IMMUNOFIX E-PHORESIS SERUM: CPT

## 2025-06-30 PROCEDURE — 82784 ASSAY IGA/IGD/IGG/IGM EACH: CPT

## 2025-06-30 RX ADMIN — FUROSEMIDE 80 MG: 40 TABLET ORAL at 18:09

## 2025-06-30 RX ADMIN — PREDNISONE 30 MG: 20 TABLET ORAL at 08:59

## 2025-06-30 RX ADMIN — METOPROLOL TARTRATE 25 MG: 25 TABLET, FILM COATED ORAL at 21:09

## 2025-06-30 RX ADMIN — BUDESONIDE AND FORMOTEROL FUMARATE DIHYDRATE 2 PUFF: 160; 4.5 AEROSOL RESPIRATORY (INHALATION) at 20:06

## 2025-06-30 RX ADMIN — FUROSEMIDE 80 MG: 40 TABLET ORAL at 08:58

## 2025-06-30 RX ADMIN — HEPARIN SODIUM 1600 UNITS: 1000 INJECTION INTRAVENOUS; SUBCUTANEOUS at 12:35

## 2025-06-30 RX ADMIN — SODIUM CHLORIDE, PRESERVATIVE FREE 10 ML: 5 INJECTION INTRAVENOUS at 21:09

## 2025-06-30 RX ADMIN — BUDESONIDE AND FORMOTEROL FUMARATE DIHYDRATE 2 PUFF: 160; 4.5 AEROSOL RESPIRATORY (INHALATION) at 07:53

## 2025-06-30 RX ADMIN — IPRATROPIUM BROMIDE AND ALBUTEROL SULFATE 1 DOSE: .5; 2.5 SOLUTION RESPIRATORY (INHALATION) at 07:53

## 2025-06-30 RX ADMIN — IPRATROPIUM BROMIDE AND ALBUTEROL SULFATE 1 DOSE: .5; 2.5 SOLUTION RESPIRATORY (INHALATION) at 20:06

## 2025-06-30 ASSESSMENT — ENCOUNTER SYMPTOMS
CONSTIPATION: 0
ABDOMINAL DISTENTION: 0
DIARRHEA: 0
SHORTNESS OF BREATH: 1
EYE DISCHARGE: 0
BACK PAIN: 0
EYE ITCHING: 0
APNEA: 0
ABDOMINAL PAIN: 0
COLOR CHANGE: 0
CHEST TIGHTNESS: 0
FACIAL SWELLING: 0

## 2025-06-30 NOTE — PROGRESS NOTES
[facility].  [address]  [place] [address]  Phone: [phone]    Fax: [phone]  Dr. Abhijit Lipscomb, CANDIE Mclean NP      SUBJECTIVE    Patient was seen and examined, plan for kidney biopsy noted.  However most of her blood work is pending.  Her serum protein electrophoresis and free light chain ratios are not resulted yet.  Her PLA2R antibody is negative.  Her proteinuria which was initially 7 g repeat is 3.4 g.  Her creatinine is now showing a downward trend  Continue to have good urine output.  Her Plavix was on hold.    History:  This is a 70 y.o. female with PMHx of COPD ex tobacco use, CAD, cor pulmonale, depression who presents with progressive shortness of breath and fatigue.  She has been feeling dyspneic just walking 20 feet over the last few months but this has been worsening in the last 1 to 2 weeks associated with a nonproductive cough.  She has been diagnosed with a PE and is on heparin drip.  She denies any history of NSAID use or any kidney problems.  She denies nephrolithiasis.  Denies any recent infections. No supplement/OTC medication use aside from tylenol.  Baseline creatinine is 0.6-0.7 and is up to 1.7 mg/dL on the day of the consult. UPCR is 7.08 with no prior available for comparison.  She reports being up to date on her mammogram, no abnormal findings in the past. She has not had a colonoscopy or CT chest. CT a/p with IV contrast done on  showing c/f lytic lesions especially near L3. CT chest for PE protocol showed scattered small PE.  GI has been consulted for elevated LFTs.  OBJECTIVE      CURRENT TEMPERATURE:  Temp: 97.9 °F (36.6 °C)  MAXIMUM TEMPERATURE OVER 24HRS:  Temp (24hrs), Av.1 °F (36.7 °C), Min:97.7 °F (36.5 °C), Max:98.5 °F (36.9 °C)    CURRENT RESPIRATORY RATE:  Respirations: 17  CURRENT PULSE:  Pulse: 86  CURRENT BLOOD PRESSURE:  BP: (!) 123/56  24HR

## 2025-06-30 NOTE — PROGRESS NOTES
PULMONARY & CRITICAL CARE MEDICINE PROGRESS NOTE     Patient:  Sunita Watts  MRN: [id number]  Admit date: 6/24/2025  Primary Care Physician: Kita Newton, APRN - CNP  Consulting Physician: Cornelius Holland MD  CODE Status: Full Code   LOS: 6    HISTORY     CHIEF COMPLAINT/REASON FOR CONSULT:    Acute hypoxic respiratory failure likely on chronic/pulmonary hypertension/COPD.    HISTORY OF PRESENT ILLNESS:  The patient is a 70 y.o. female she has history of COPD severity not known no previous pulmonary function test available Long history of smoking until recently at least 40-pack-year, history of coronary artery disease status post PCI in 2023 last cardiac catheterization was March 2025, chronic diastolic heart failure.  According to patient she has been having gradual increasing shortness of breath for few months but especially in the last 1 week or so.  According patient she does not have cough with sputum production no fever no chills no diarrhea or nausea feels slight abdominal bloating but does complain of shortness of breath without change in his sputum or wheezing.  He came to emergency room and she was started on BiPAP initially and then after that BiPAP has been off and she is placed on high flow nasal cannula.    Her initial labs shows a BUN of 41 creatinine 1.5 bicarbonate was 33 proBNP was greater than 10,000 AST was 827 ALT was 958 total bili 2.2 alkaline phosphatase 459 WBC 12.3 hemoglobin 14.5 hematocrit 48.9 and platelet count is 283.  INR was 1.4 respiratory panel is negative flu is negative urinalysis shows WBC 20-50,  Initial blood gas was 7.4 2/60/36/39 bicarbonate.  CTA chest shows small subsegmental minimal lower lung pulm embolism per radiology no significant pleural effusion or consolidation was seen.    Her last echocardiogram on 12/27/2024 shows moderately reduced RV function RV was severely dilated moderate tricuspid regurgitation and severely elevated RVSP.      INTERVAL        Cardiac Enzymes:  No results for input(s): \"CKTOTAL\", \"CKMB\", \"CKMBINDEX\", \"TROPONINI\" in the last 72 hours.  Lactic Acid:  Lab Results   Component Value Date    LACTA 2.0 06/24/2025     BNP:   No results found for: \"BNP\"  D-Dimer:  Lab Results   Component Value Date    DDIMER 4.16 (H) 06/24/2025     Others:   Lab Results   Component Value Date    TSH 0.60 06/25/2025    T4FREE 1.5 11/09/2023     Lab Results   Component Value Date    CECILIA NEGATIVE 06/25/2025     No results found for: \"LABURIC\", \"URICACID\"  Lab Results   Component Value Date    IRON 20 (L) 06/25/2025    TIBC 395 06/25/2025    FERRITIN 38 06/25/2025     No results found for: \"SPEP\", \"UPEP\"  No results found for: \"PSA\", \"CEA\", \"\", \"XX3155\", \"\"  Microbiology:  No results for input(s): \"SPECDESC\", \"SPECIAL\", \"CULTURE\", \"STATUS\", \"ORG\", \"CDIFFTOXPCR\", \"CAMPYLOBPCR\", \"SALMONELLAPC\", \"SHIGAPCR\", \"SHIGELLAPCR\", \"MPNEUG\", \"MPNEUM\", \"LACTOQL\" in the last 72 hours.    Pathology:    Radiology Reports:  US RENAL COMPLETE   Final Result   1. Findings compatible with medical renal disease.  No hydronephrosis.   2. Poorly visualized urinary bladder with a Gottlieb catheter in place.         US ABDOMEN LIMITED W DOPPLER   Final Result   Addendum (preliminary) 1 of 1   ADDENDUM:   Upon review of the renal ultrasound, hepatic echogenicity likely mildly   hyperechoic suggesting probable mild steatosis.  No marked steatosis   suspected.         Final   1. Unremarkable right upper quadrant ultrasound.   2. Unremarkable Doppler assessment of the hepatic vessels, as above.         FL MODIFIED BARIUM SWALLOW W VIDEO   Final Result   Swallowing mechanism grossly within normal limits without evidence of   aspiration.      Please see separate speech pathology report for full discussion of findings   and recommendations.         Vascular duplex lower extremity venous bilateral   Final Result        ECHOCARDIOGRAM  Results for orders placed during the hospital

## 2025-06-30 NOTE — CONSULTS
Today's Date: 6/30/2025  Patient Name: Sunita Watts  Date of admission: 6/24/2025 10:16 PM  Patient's age: 70 y.o., 1955  Admission Dx: Acute on chronic respiratory failure (HCC) [J96.20]  NSTEMI (non-ST elevated myocardial infarction) (HCC) [I21.4]  ALPA (acute kidney injury) [N17.9]  PE (pulmonary thromboembolism) (HCC) [I26.99]    Reason for Consult: CT a/p with IV contrast done 6/24 shoing c/f lytic lesions especially near L3 and anemia    Requesting Physician: Cornelius Holland MD    Chief Complaint: Shortness of breath    History Obtained From:  patient, spouse, electronic medical record    History of Present Illness:      The patient is a 70 y.o. female who is admitted to the hospital for management of acute on chronic respiratory failure.    He has a past medical history of COPD, CAD status post PCI LAD, cor pulmonale, tobacco user (quit a few weeks ago).    She presented to the ED with increased dyspnea and was found to have acute renal failure and transaminitis.  CT chest abdomen and pelvis completed on 6/24 shows scattered small peripheral acute pulmonary emboli, has hepatic steatosis, prominent uterus and adnexal structures, scattered vertebral body and probably iliac bone lucency is best seen L3.  She has been evaluated by nephrology and plan is for renal biopsy tomorrow.  She was also evaluated by GI with concerns for congestive hepatopathy, LFTs improving    Labs today are significant for creatinine 2.7, calcium 8.8, SPEP, kappa lambda free light chain and urine protein electrophoresis pending.    Past Medical History:   has a past medical history of COPD (chronic obstructive pulmonary disease) (HCC) and Depression.    Past Surgical History:   has a past surgical history that includes eye surgery (Cataract); Cardiac procedure (N/A, 12/08/2023); Cardiac procedure (N/A, 12/08/2023); Cardiac catheterization (03/25/2025); Cardiac procedure (N/A, 3/25/2025); invasive vascular (N/A,     Nephrotic range proteinuria [R80.9] 06/25/2025    ALPA (acute kidney injury) [N17.9] 06/25/2025    Fatty liver [K76.0] 06/25/2025    Pulmonary hypertension (HCC) [I27.20] 06/25/2025    COPD with acute exacerbation (HCC) [J44.1] 06/25/2025    Tobacco dependence [F17.200] 06/25/2025    Acute on chronic respiratory failure (HCC) [J96.20] 06/24/2025         Assessment:  Pulmonary emboli  Scattered vertebral body and probable iliac bone lesions  ALPA  Hepatic steatosis  Acute on chronic respiratory failure    Plan:  I reviewed the labs/imaging available to me,outside records and discussed with the patient.I explained to the patient the nature of this problem. I explained the significance of these abnormalities and possible etiology and management options  Follow-up on SPEP  We will also order immunoglobulin panel and immunofixation  Continue heparin drip with plans to change to Eliquis upon discharge  Symptomatic and supportive care  Continue to encourage tobacco cessation  Final recommendations to follow after discussing with the attending physician, Dr. Rand      Discussed with patient and spouse and Nurse.      Thank you for asking us to see this patient.    Princess George, APRN-CNP   [facility]ematology/On[facility]  6/30/2025      I have discussed the care of  this patient and I have personally examined the patient myselft and taken ros and hpi , including pertinent history and exam findings, labs, imaging , medication ad other relavent clincal data.  I have reviewed the key elements of all parts of the encounter with the Nurse Practitioner .  I agree with the assessment, plan and orders as documented by the  NP with the following addendum     More than 50% of the time was spent taking care of this patient in addition to the nurse practitioner time.  That also included history taking follow-up physical examination and review of system.      Follow-up on paraproteinemia profile.  If positive will proceed with bone

## 2025-06-30 NOTE — PLAN OF CARE
Problem: Discharge Planning  Goal: Discharge to home or other facility with appropriate resources  6/29/2025 1732 by Suma Rod, RN  Outcome: Progressing     Problem: Safety - Adult  Goal: Free from fall injury  6/29/2025 1732 by Suma Rod, RN  Outcome: Progressing     Problem: Respiratory - Adult  Goal: Achieves optimal ventilation and oxygenation  6/29/2025 1732 by Suma Rod, RN  Outcome: Progressing  Flowsheets (Taken 6/29/2025 0859 by Dayana Armando PIOTR)  Achieves optimal ventilation and oxygenation:   Respiratory therapy support as indicated   Assess and instruct to report shortness of breath or any respiratory difficulty   Assess the need for suctioning and aspirate as needed   Encourage broncho-pulmonary hygiene including cough, deep breathe, incentive spirometry   Initiate smoking cessation protocol as indicated   Oxygen supplementation based on oxygen saturation or arterial blood gases   Position to facilitate oxygenation and minimize respiratory effort   Assess for changes in mentation and behavior   Assess for changes in respiratory status     Problem: Skin/Tissue Integrity  Goal: Skin integrity remains intact  Description: 1.  Monitor for areas of redness and/or skin breakdown  2.  Assess vascular access sites hourly  3.  Every 4-6 hours minimum:  Change oxygen saturation probe site  4.  Every 4-6 hours:  If on nasal continuous positive airway pressure, respiratory therapy assess nares and determine need for appliance change or resting period  6/29/2025 1732 by Suma Rod, RN  Outcome: Progressing

## 2025-06-30 NOTE — PROGRESS NOTES
[facility]   Gastroenterology Progress Note    Sunita Watts is a 70 y.o. female patient.  Hospitalization Day:6      Chief consult reason:   Abnormal LFTs    Subjective:  Seen and examined, no acute events overnight  LFT's continue to improve      VITALS:  BP (!) 123/56   Pulse 86   Temp 97.9 °F (36.6 °C) (Oral)   Resp 17   Ht 1.549 m (5' 0.98\")   Wt 59.5 kg (131 lb 1.6 oz)   SpO2 97%   BMI 24.78 kg/m²   TEMPERATURE:  Current - Temp: 97.9 °F (36.6 °C); Max - Temp  Av.1 °F (36.7 °C)  Min: 97.7 °F (36.5 °C)  Max: 98.5 °F (36.9 °C)    Physical Assessment:  General appearance:  alert, cooperative and no distress  Mental Status:  oriented to person, place and time and normal affect  Lungs:  clear to auscultation bilaterally, normal effort  Heart:  regular rate and rhythm, no murmur  Abdomen:  soft, nontender, nondistended, normal bowel sounds, no masses, hepatomegaly, splenomegaly  Extremities:  no edema, redness, tenderness in the calves  Skin:  no gross lesions, rashes, induration    Data Review:    Labs and Imaging:       CBC:  Recent Labs     25  0811 25  0748 25  0321   WBC 12.1* 12.9* 13.2*   HGB 11.7* 11.5* 11.9   MCV 85.4 84.7 84.7   RDW 15.8* 16.1* 16.8*    199 227       ANEMIA STUDIES:  No results for input(s): \"TIBC\", \"FERRITIN\", \"BMKIIVQA63\", \"FOLATE\", \"OCCULTBLD\" in the last 72 hours.    Invalid input(s): \"LABIRON\"    BMP:  Recent Labs     25  0811 25  0748 25  0321    138 138   K 4.6 4.0 3.7   CL 94* 96* 94*   CO2 30 30 32*   BUN 62* 58* 56*   CREATININE 3.0* 2.8* 2.7*   GLUCOSE 107* 105* 96   CALCIUM 8.8 8.6 8.8       LFTS:  Recent Labs     25  0811 25  0748 25  0321   ALKPHOS 541* 465* 450*   * 387* 331*   * 105* 85*   BILITOT 0.7 0.5 0.5       Other pertinent labs:  HIV nonreactive  EBV pending  SMA, AMA, ceruloplasmin, CMV IgM normal  Acute hepatitis panel nonreactive  AFP 8.0 WNL,

## 2025-06-30 NOTE — PROGRESS NOTES
[facility]   Progress Note                   Date:   6/30/2025  Patient name: Sunita Watts  Date of admission:  6/24/2025 10:16 PM  MRN:   [id number]  YOB: 1955  PCP: Kita Newton, BRET - CNP    Reason for Admission: Acute on chronic respiratory failure ([facility]) [J96.20]  NSTEMI (non-ST elevated myocardial infarction) ([facility]) [I21.4]  ALPA (acute kidney injury) [N17.9]  PE (pulmonary thromboembolism) ([facility]) [I26.99]    Subjective:       Clinical Changes / Abnormalities: Pt seen and examined in the room.  Patient resting in the chair. Pt denies any CP. Labs, vitals and tele reviewed- SR   Heparin drip infusing.     Medications:   Scheduled Meds:   polyethylene glycol  17 g Oral Daily    furosemide  80 mg Oral BID    ipratropium 0.5 mg-albuterol 2.5 mg  1 Dose Inhalation BID RT    budesonide-formoterol  2 puff Inhalation BID RT    metoprolol tartrate  25 mg Oral BID    [Held by provider] rosuvastatin  20 mg Oral Daily    sodium chloride flush  5-40 mL IntraVENous 2 times per day     Continuous Infusions:   sodium chloride      heparin (PORCINE) Infusion 17 Units/kg/hr (06/30/25 1235)     CBC:   Recent Labs     06/28/25  0811 06/29/25  0748 06/30/25  0321   WBC 12.1* 12.9* 13.2*   HGB 11.7* 11.5* 11.9    199 227     BMP:    Recent Labs     06/28/25  0811 06/29/25  0748 06/30/25  0321    138 138   K 4.6 4.0 3.7   CL 94* 96* 94*   CO2 30 30 32*   BUN 62* 58* 56*   CREATININE 3.0* 2.8* 2.7*   GLUCOSE 107* 105* 96     Hepatic:   Recent Labs     06/28/25  0811 06/29/25  0748 06/30/25  0321   * 105* 85*   * 387* 331*   BILITOT 0.7 0.5 0.5   ALKPHOS 541* 465* 450*     Troponin:   No results for input(s): \"TROPHS\" in the last 72 hours.    BNP: No results for input(s): \"BNP\" in the last 72 hours.  Lipids:   No results for input(s): \"CHOL\", \"HDL\" in the last 72 hours.    Invalid input(s): \"LDLCALCU\"    INR:   Recent Labs     06/29/25  0748   INR 1.2

## 2025-06-30 NOTE — PROGRESS NOTES
[facility]  Office: [phone]  Aleksandr Frias, DO, Nain Gooden, DO, Papo Hurst DO, Benji Howard, DO, Sandro Contreras MD, Shaila Gray MD, Prince Lewis MD, Nini Song MD,  Rao Khalil MD, Victor Manuel Herman MD, Cornelius Holland MD,  Erasto Lezama DO, Chela Slaughter MD, Alonzo Go MD, Sean Frias DO, Lolly Gomes MD,  Phillip Scanlon DO, Macey Perkins MD, Filomena Gonzalez MD, Morales Monzon MD,  Adolfo Garza MD, Nelly Bush MD, Danielle Dee MD, Lamine Ramos MD, Conor Rivas MD, Abhijeet Harkins MD, Eddy Soto, DO, Komal Samuels MD, Justin Cooper DO, Yuan Foster MD, Erasto Shin MD, Mohsin Reza, MD, Dionicio Garg MD, Shirley Waterhouse, CNP,  Sole Grimes, CNP, Eddy Reyes, CNP,  Ksenia Thrasher, DNP, Soraya Mckeon, CNP, Sabrina Grace, CNP, Aniya Macdonald, CNP, Bridgette Cohen, CNP, Yamilex Spence, PA-C, Olinda Loredo, CNP, Shavon Mina, CNP,  Xiomara Basurto, CNP, Tayler Rosario, CNP, Shon Anand, PA-C, Cortney Wellington, PA-C, Federica Maza, CNP,        Demetra Bloom, CNS, Yoly Coleman, CNP, Jenny Fernandez, CNP         [facility]   IN-PATIENT SERVICE   [facility]    Progress Note    6/30/2025    1:34 PM    Name:   Sunita Watts  MRN:     [id number]     Acct:      [id number]   Room:   2005/2005-01  IP Day:  6  Admit Date:  6/24/2025 10:16 PM    PCP:   Kita Newton APRN - CNP  Code Status:  Full Code    Subjective:     C/C: No chief complaint on file.    Interval History Status: not changed.     Patient seen examined at bedside.  Creatinine slightly improved down to 2.7.  Feeling okay otherwise.    Brief History:       Sunita Watts is a 70 y.o.  female w/ CAD s/p PCI LAD 2023 ([facility] 03/2025 patent stent, nonobst dz) w/ cor pulmonale (echo 12/2024 +RV dysfunction w/ EF 55%, severe PTHN, mod TR; CTA PE 01/2025 neg PE) who has been struggling with subacute dyspnea over the past 8-12 months with acute worsening

## 2025-06-30 NOTE — PROGRESS NOTES
Physician Progress Note      PATIENT:               JACQUELINE MALONEY  CSN #:                  [id number]  :                       1955  ADMIT DATE:       2025 10:16 PM  DISCH DATE:  RESPONDING  PROVIDER #:        LILIBETH Zafar NP          QUERY TEXT:    The admitting/treating physician is required to clarify conflicting   documentation in the medical record.  Noted documentation of NSTEMI is   documented in H&P on  and elevated troponins from acute pe per cardiology   consult on    Based on your medical judgement, please clarify the   following:    The clinical indicators include:  Patient presented with Acute respiratory failure, acute diastolic CHF.   Troponin levels 259>249. Cardiology consult on  documents \"CT PE showing   scattered small peripheral acute pulmonary embolism, can lead to elevated   troponin. Patient not complaining of any chest pain, palpitations.\" H&P notes   NSTEMI \" not further documented on  progress notes. pending Echo potential   for RHC.  Options provided:  -- NSTEMI ruled out Demand ischemia due to acute pulmonary embolism  -- NSTEMI ruled out non ischemic myocardial injury confirmed  -- NSTEMI ruled out  -- NSTEMI confirmed  -- Other - I will add my own diagnosis  -- Disagree - Not applicable / Not valid  -- Disagree - Clinically unable to determine / Unknown  -- Refer to Clinical Documentation Reviewer    PROVIDER RESPONSE TEXT:    After study NSTEMI is ruled out    Query created by: Princess Chavarria on 2025 6:50 AM      Electronically signed by:  LILIBETH Zafar NP 2025 1:29 PM

## 2025-07-01 PROBLEM — M89.9 BONE LESION: Status: ACTIVE | Noted: 2025-07-01

## 2025-07-01 LAB
ALBUMIN PERCENT: 51 % (ref 56–66)
ALBUMIN SERPL-MCNC: 3.1 G/DL (ref 3.2–5.2)
ALBUMIN SERPL-MCNC: 3.6 G/DL (ref 3.5–5.2)
ALBUMIN/GLOB SERPL: 1.3 {RATIO} (ref 1–2.5)
ALP SERPL-CCNC: 412 U/L (ref 35–104)
ALPHA 2 PERCENT: 17 % (ref 7–12)
ALPHA1 GLOB SERPL ELPH-MCNC: 0.4 G/DL (ref 0.1–0.4)
ALPHA1 GLOB SERPL ELPH-MCNC: 6 % (ref 3–5)
ALPHA2 GLOB SERPL ELPH-MCNC: 1 G/DL (ref 0.5–0.9)
ALT SERPL-CCNC: 258 U/L (ref 10–35)
ANION GAP SERPL CALCULATED.3IONS-SCNC: 12 MMOL/L (ref 9–16)
ANION GAP SERPL CALCULATED.3IONS-SCNC: 14 MMOL/L (ref 9–16)
AST SERPL-CCNC: 69 U/L (ref 10–35)
B-GLOBULIN SERPL ELPH-MCNC: 0.8 G/DL (ref 0.7–1.4)
B-GLOBULIN SERPL ELPH-MCNC: 13 % (ref 8–13)
BILIRUB DIRECT SERPL-MCNC: 0.4 MG/DL (ref 0–0.2)
BILIRUB INDIRECT SERPL-MCNC: 0.3 MG/DL (ref 0–1)
BILIRUB SERPL-MCNC: 0.7 MG/DL (ref 0–1.2)
BODY TEMPERATURE: 37
BUN SERPL-MCNC: 45 MG/DL (ref 8–23)
BUN SERPL-MCNC: 47 MG/DL (ref 8–23)
CALCIUM SERPL-MCNC: 9 MG/DL (ref 8.6–10.4)
CALCIUM SERPL-MCNC: 9.1 MG/DL (ref 8.6–10.4)
CHLORIDE SERPL-SCNC: 88 MMOL/L (ref 98–107)
CHLORIDE SERPL-SCNC: 90 MMOL/L (ref 98–107)
CO2 SERPL-SCNC: 35 MMOL/L (ref 20–31)
CO2 SERPL-SCNC: 38 MMOL/L (ref 20–31)
COHGB MFR BLD: 1.1 % (ref 0–5)
CREAT SERPL-MCNC: 2.3 MG/DL (ref 0.6–0.9)
CREAT SERPL-MCNC: 2.4 MG/DL (ref 0.6–0.9)
FIO2 ON VENT: ABNORMAL %
GAMMA GLOB SERPL ELPH-MCNC: 0.8 G/DL (ref 0.5–1.5)
GAMMA GLOBULIN %: 14 % (ref 11–19)
GFR, ESTIMATED: 21 ML/MIN/1.73M2
GFR, ESTIMATED: 22 ML/MIN/1.73M2
GLOBULIN SER CALC-MCNC: 2.7 G/DL
GLUCOSE SERPL-MCNC: 134 MG/DL (ref 74–99)
GLUCOSE SERPL-MCNC: 86 MG/DL (ref 74–99)
HCO3 VENOUS: 37 MMOL/L (ref 24–30)
MAGNESIUM SERPL-MCNC: 1.4 MG/DL (ref 1.6–2.4)
O2 SAT, VEN: 88.9 % (ref 60–85)
PATHOLOGIST: ABNORMAL
PCO2 VENOUS: 59.9 MM HG (ref 39–55)
PH VENOUS: 7.41 (ref 7.32–7.42)
PO2 VENOUS: 56.6 MM HG (ref 30–50)
POSITIVE BASE EXCESS, VEN: 10 MMOL/L (ref 0–2)
POTASSIUM SERPL-SCNC: 3 MMOL/L (ref 3.7–5.3)
POTASSIUM SERPL-SCNC: 3.4 MMOL/L (ref 3.7–5.3)
PROT PATTERN SERPL ELPH-IMP: ABNORMAL
PROT SERPL-MCNC: 6.1 G/DL (ref 6.6–8.7)
PROT SERPL-MCNC: 6.3 G/DL (ref 6.6–8.7)
SODIUM SERPL-SCNC: 138 MMOL/L (ref 136–145)
SODIUM SERPL-SCNC: 139 MMOL/L (ref 136–145)
TOTAL PROT. SUM,%: 101 % (ref 98–102)
TOTAL PROT. SUM: 6.1 G/DL (ref 6.3–8.2)

## 2025-07-01 PROCEDURE — 80076 HEPATIC FUNCTION PANEL: CPT

## 2025-07-01 PROCEDURE — 99232 SBSQ HOSP IP/OBS MODERATE 35: CPT | Performed by: INTERNAL MEDICINE

## 2025-07-01 PROCEDURE — 94761 N-INVAS EAR/PLS OXIMETRY MLT: CPT

## 2025-07-01 PROCEDURE — 82805 BLOOD GASES W/O2 SATURATION: CPT

## 2025-07-01 PROCEDURE — 6370000000 HC RX 637 (ALT 250 FOR IP)

## 2025-07-01 PROCEDURE — 99232 SBSQ HOSP IP/OBS MODERATE 35: CPT

## 2025-07-01 PROCEDURE — 2580000003 HC RX 258

## 2025-07-01 PROCEDURE — 99223 1ST HOSP IP/OBS HIGH 75: CPT | Performed by: INTERNAL MEDICINE

## 2025-07-01 PROCEDURE — 6370000000 HC RX 637 (ALT 250 FOR IP): Performed by: INTERNAL MEDICINE

## 2025-07-01 PROCEDURE — 6370000000 HC RX 637 (ALT 250 FOR IP): Performed by: STUDENT IN AN ORGANIZED HEALTH CARE EDUCATION/TRAINING PROGRAM

## 2025-07-01 PROCEDURE — 2060000000 HC ICU INTERMEDIATE R&B

## 2025-07-01 PROCEDURE — 94640 AIRWAY INHALATION TREATMENT: CPT

## 2025-07-01 PROCEDURE — 83735 ASSAY OF MAGNESIUM: CPT

## 2025-07-01 PROCEDURE — 2500000003 HC RX 250 WO HCPCS

## 2025-07-01 PROCEDURE — 83516 IMMUNOASSAY NONANTIBODY: CPT

## 2025-07-01 PROCEDURE — 6360000002 HC RX W HCPCS

## 2025-07-01 PROCEDURE — 80048 BASIC METABOLIC PNL TOTAL CA: CPT

## 2025-07-01 PROCEDURE — 2700000000 HC OXYGEN THERAPY PER DAY

## 2025-07-01 PROCEDURE — 36415 COLL VENOUS BLD VENIPUNCTURE: CPT

## 2025-07-01 RX ORDER — MAGNESIUM SULFATE HEPTAHYDRATE 40 MG/ML
2000 INJECTION, SOLUTION INTRAVENOUS ONCE
Status: DISCONTINUED | OUTPATIENT
Start: 2025-07-01 | End: 2025-07-02 | Stop reason: HOSPADM

## 2025-07-01 RX ORDER — HEPARIN SODIUM 10000 [USP'U]/100ML
5-30 INJECTION, SOLUTION INTRAVENOUS CONTINUOUS
Status: DISCONTINUED | OUTPATIENT
Start: 2025-07-01 | End: 2025-07-01

## 2025-07-01 RX ORDER — FUROSEMIDE 40 MG/1
40 TABLET ORAL 2 TIMES DAILY
Status: DISCONTINUED | OUTPATIENT
Start: 2025-07-01 | End: 2025-07-01

## 2025-07-01 RX ORDER — ASPIRIN 81 MG/1
81 TABLET ORAL DAILY
Status: DISCONTINUED | OUTPATIENT
Start: 2025-07-01 | End: 2025-07-02 | Stop reason: HOSPADM

## 2025-07-01 RX ORDER — SPIRONOLACTONE 25 MG/1
12.5 TABLET ORAL ONCE
Status: COMPLETED | OUTPATIENT
Start: 2025-07-01 | End: 2025-07-01

## 2025-07-01 RX ORDER — FUROSEMIDE 40 MG/1
40 TABLET ORAL DAILY
Status: DISCONTINUED | OUTPATIENT
Start: 2025-07-01 | End: 2025-07-02 | Stop reason: HOSPADM

## 2025-07-01 RX ORDER — CLOPIDOGREL BISULFATE 75 MG/1
75 TABLET ORAL DAILY
Status: DISCONTINUED | OUTPATIENT
Start: 2025-07-01 | End: 2025-07-02 | Stop reason: HOSPADM

## 2025-07-01 RX ORDER — ALBUTEROL SULFATE 90 UG/1
2 INHALANT RESPIRATORY (INHALATION) EVERY 6 HOURS PRN
Qty: 18 G | Refills: 3 | Status: SHIPPED | OUTPATIENT
Start: 2025-07-01 | End: 2025-07-31

## 2025-07-01 RX ORDER — POTASSIUM CHLORIDE 1500 MG/1
40 TABLET, EXTENDED RELEASE ORAL ONCE
Status: COMPLETED | OUTPATIENT
Start: 2025-07-01 | End: 2025-07-01

## 2025-07-01 RX ORDER — MAGNESIUM SULFATE 1 G/100ML
1000 INJECTION INTRAVENOUS ONCE
Status: COMPLETED | OUTPATIENT
Start: 2025-07-01 | End: 2025-07-01

## 2025-07-01 RX ORDER — BUDESONIDE AND FORMOTEROL FUMARATE DIHYDRATE 160; 4.5 UG/1; UG/1
2 AEROSOL RESPIRATORY (INHALATION)
Qty: 10.2 G | Refills: 3 | Status: SHIPPED | OUTPATIENT
Start: 2025-07-01 | End: 2025-07-02

## 2025-07-01 RX ADMIN — SODIUM CHLORIDE, PRESERVATIVE FREE 10 ML: 5 INJECTION INTRAVENOUS at 08:04

## 2025-07-01 RX ADMIN — POTASSIUM CHLORIDE 40 MEQ: 1500 TABLET, EXTENDED RELEASE ORAL at 11:40

## 2025-07-01 RX ADMIN — MAGNESIUM SULFATE HEPTAHYDRATE 1000 MG: 1 INJECTION, SOLUTION INTRAVENOUS at 13:38

## 2025-07-01 RX ADMIN — FUROSEMIDE 80 MG: 40 TABLET ORAL at 08:04

## 2025-07-01 RX ADMIN — ASPIRIN 81 MG: 81 TABLET, COATED ORAL at 15:21

## 2025-07-01 RX ADMIN — APIXABAN 10 MG: 5 TABLET, FILM COATED ORAL at 21:30

## 2025-07-01 RX ADMIN — SODIUM CHLORIDE, PRESERVATIVE FREE 10 ML: 5 INJECTION INTRAVENOUS at 21:30

## 2025-07-01 RX ADMIN — IPRATROPIUM BROMIDE AND ALBUTEROL SULFATE 1 DOSE: .5; 2.5 SOLUTION RESPIRATORY (INHALATION) at 08:52

## 2025-07-01 RX ADMIN — METOPROLOL TARTRATE 25 MG: 25 TABLET, FILM COATED ORAL at 08:04

## 2025-07-01 RX ADMIN — IPRATROPIUM BROMIDE AND ALBUTEROL SULFATE 1 DOSE: .5; 2.5 SOLUTION RESPIRATORY (INHALATION) at 19:45

## 2025-07-01 RX ADMIN — SODIUM CHLORIDE: 9 INJECTION, SOLUTION INTRAVENOUS at 13:37

## 2025-07-01 RX ADMIN — BUDESONIDE AND FORMOTEROL FUMARATE DIHYDRATE 2 PUFF: 160; 4.5 AEROSOL RESPIRATORY (INHALATION) at 19:45

## 2025-07-01 RX ADMIN — BUDESONIDE AND FORMOTEROL FUMARATE DIHYDRATE 2 PUFF: 160; 4.5 AEROSOL RESPIRATORY (INHALATION) at 08:54

## 2025-07-01 RX ADMIN — SPIRONOLACTONE 12.5 MG: 25 TABLET, FILM COATED ORAL at 15:21

## 2025-07-01 RX ADMIN — METOPROLOL TARTRATE 25 MG: 25 TABLET, FILM COATED ORAL at 21:30

## 2025-07-01 RX ADMIN — CLOPIDOGREL BISULFATE 75 MG: 75 TABLET, FILM COATED ORAL at 15:21

## 2025-07-01 NOTE — PLAN OF CARE
Problem: Discharge Planning  Goal: Discharge to home or other facility with appropriate resources  Outcome: Progressing  Flowsheets (Taken 7/1/2025 0803)  Discharge to home or other facility with appropriate resources: Identify barriers to discharge with patient and caregiver     Problem: Respiratory - Adult  Goal: Achieves optimal ventilation and oxygenation  Outcome: Progressing     Problem: Chronic Conditions and Co-morbidities  Goal: Patient's chronic conditions and co-morbidity symptoms are monitored and maintained or improved  Outcome: Progressing  Flowsheets (Taken 7/1/2025 0803)  Care Plan - Patient's Chronic Conditions and Co-Morbidity Symptoms are Monitored and Maintained or Improved:   Monitor and assess patient's chronic conditions and comorbid symptoms for stability, deterioration, or improvement   Collaborate with multidisciplinary team to address chronic and comorbid conditions and prevent exacerbation or deterioration   Update acute care plan with appropriate goals if chronic or comorbid symptoms are exacerbated and prevent overall improvement and discharge

## 2025-07-01 NOTE — PLAN OF CARE
Problem: Respiratory - Adult  Goal: Achieves optimal ventilation and oxygenation  7/1/2025 1948 by Abena Pace, MARISOL  Outcome: Progressing   BRONCHOSPASM/BRONCHOCONSTRICTION     [x]         IMPROVE AERATION/BREATH SOUNDS  [x]   ADMINISTER BRONCHODILATOR THERAPY AS APPROPRIATE  [x]   ASSESS BREATH SOUNDS  [x]   IMPLEMENT AEROSOL/MDI PROTOCOL  [x]   PATIENT EDUCATION AS NEEDED

## 2025-07-01 NOTE — PROGRESS NOTES
Lab Results   Component Value Date    C4 21 06/25/2025     MPO ANCA:   Lab Results   Component Value Date/Time    MPO <0.3 06/25/2025 09:07 AM    .  PR3 ANCA:    Lab Results   Component Value Date/Time    PR3 <0.7 06/25/2025 09:07 AM   URINALYSIS:  U/A:   Lab Results   Component Value Date/Time    NITRU NEGATIVE 06/25/2025 01:24 AM    COLORU Yellow 06/25/2025 01:24 AM    PHUR 6.0 06/25/2025 01:24 AM    WBCUA 20 TO 50 06/25/2025 01:24 AM    RBCUA 5 TO 10 06/25/2025 01:24 AM    BACTERIA None 06/25/2025 01:24 AM    LEUKOCYTESUR NEGATIVE 06/25/2025 01:24 AM    UROBILINOGEN 1.0 06/25/2025 01:24 AM    BILIRUBINUR NEGATIVE 06/25/2025 01:24 AM    GLUCOSEU NEGATIVE 06/25/2025 01:24 AM    KETUA NEGATIVE 06/25/2025 01:24 AM     ANTIGBM:  Lab Results   Component Value Date/Time    GBMABIGG <1.9 06/25/2025 09:07 AM         RADIOLOGY      Reviewed as available.      ASSESSMENT      1.  Nonoliguric ALPA due to hypoperfusion state further complicated by use of contrast.  Her renal functions are improving and creatinine today is 2.4 after reaching a peak of 3.0 mg/dL  2. Nephrotic range proteinuria without nephrotic syndrome: Patient is not diabetic.  There is no prior history of renal disease.  With lytic lesion on CT scan there is a concern of paraproteinemia or multiple myeloma.  Her workup is still negative for monoclonal banding.  Albumin normal   UA with blood and wbc but many epi cells  HIV, hepatitis panel negative  Ddx includes multiple myeloma, minimal change disease, FSGS, amyloidosis.     3. PE without right heart strain: May be needing anticoagulation as an outpatient  4.  Mixed metabolic alkalosis and chronic hypercapnia  5. Severe pulmonary hypertension, acute on chronic HFpEF, improved with IV Lasix  6.  Iron deficiency-hemoglobin is likely falsely high due to chronic hypoxia, anemia, ALPA,  6. COPD, AHRF, pulmonary hypertension    PLAN      1.  Replace potassium.  2.  Decrease lasix to 40 mg PO BID.   3.  Will

## 2025-07-01 NOTE — PROGRESS NOTES
Nutrition Assessment     Type and Reason for Visit: Initial, LOS    Nutrition Recommendations/Plan:   Continue current diet.      Malnutrition Assessment:  Malnutrition Status: No malnutrition    Nutrition Assessment:  Pt admit with respiratory failure. Seen for LOS. PMH; CAD, COPD. Pt intakes since admit of %. Stable wts per chart. Pt denies appetite and wt loss. Pt does not have any nutrition related questions or concerns.    Nutrition Related Findings:   Edema: +1 BLE. LBM 7/1. Labs: K 3.0, Mg 1.4. Meds: glycolax. Wound Type: None    Current Nutrition Therapies:    ADULT DIET; Regular; Low Fat/Low Chol/High Fiber/2 gm Na    Anthropometric Measures:  Height: 154.9 cm (5' 0.98\")  Current Body Wt: 58.2 kg (128 lb 4.9 oz)   BMI: 24.3        Nutrition Diagnosis:   No nutrition diagnosis at this time       Nutrition Interventions:   Food and/or Nutrient Delivery: Continue Current Diet    Discharge Planning:    No discharge needs at this time     KAMERON MARTINEZ RD  Contact: [phone]   Mobile: [phone]

## 2025-07-01 NOTE — PROGRESS NOTES
Today's Date: 7/1/2025  Patient Name: Sunita Watts  Date of admission: 6/24/2025 10:16 PM  Patient's age: 70 y.o., 1955  Admission Dx: Acute on chronic respiratory failure (HCC) [J96.20]  NSTEMI (non-ST elevated myocardial infarction) (HCC) [I21.4]  ALPA (acute kidney injury) [N17.9]  PE (pulmonary thromboembolism) (HCC) [I26.99]    Reason for Consult: CT a/p with IV contrast done 6/24 shoing c/f lytic lesions especially near L3 and anemia    Requesting Physician: No admitting provider for patient encounter.    Chief Complaint: Shortness of breath    History Obtained From:  patient, spouse, electronic medical record      Interval history:    Patient seen and examined  Labs and vitals reviewed  Tolerating Eliquis.  Denies bleeding  Resting comfortably          History of Present Illness:      The patient is a 70 y.o. female who is admitted to the hospital for management of acute on chronic respiratory failure.    He has a past medical history of COPD, CAD status post PCI LAD, cor pulmonale, tobacco user (quit a few weeks ago).    She presented to the ED with increased dyspnea and was found to have acute renal failure and transaminitis.  CT chest abdomen and pelvis completed on 6/24 shows scattered small peripheral acute pulmonary emboli, has hepatic steatosis, prominent uterus and adnexal structures, scattered vertebral body and probably iliac bone lucency is best seen L3.  She has been evaluated by nephrology and plan is for renal biopsy tomorrow.  She was also evaluated by GI with concerns for congestive hepatopathy, LFTs improving    Labs today are significant for creatinine 2.7, calcium 8.8, SPEP, kappa lambda free light chain and urine protein electrophoresis pending.    Past Medical History:   has a past medical history of COPD (chronic obstructive pulmonary disease) (HCC) and Depression.    Past Surgical History:   has a past surgical history that includes eye surgery (Cataract); Cardiac

## 2025-07-01 NOTE — PROGRESS NOTES
[facility]   Gastroenterology Progress Note    Sunita Watts is a 70 y.o. female patient.  Hospitalization Day:7      Chief consult reason:   Abnormal LFTs    Subjective:  Seen and examined, no acute events overnight  LFT's continue to improve  Patient is not receiving any type of treatment for HSV-concerned that HSV is contributing to the elevated LFTs and lack of improvement    VITALS:  BP (!) 141/91   Pulse 78   Temp 98.4 °F (36.9 °C) (Oral)   Resp 16   Ht 1.549 m (5' 0.98\")   Wt 58.2 kg (128 lb 6.4 oz)   SpO2 99%   BMI 24.27 kg/m²   TEMPERATURE:  Current - Temp: 98.4 °F (36.9 °C); Max - Temp  Av.3 °F (36.8 °C)  Min: 97.9 °F (36.6 °C)  Max: 98.6 °F (37 °C)    Physical Assessment:  General appearance:  alert, cooperative and no distress  Mental Status:  oriented to person, place and time and normal affect  Lungs:  clear to auscultation bilaterally, normal effort  Heart:  regular rate and rhythm, no murmur  Abdomen:  soft, nontender, nondistended, normal bowel sounds, no masses, hepatomegaly, splenomegaly  Extremities:  no edema, redness, tenderness in the calves  Skin:  no gross lesions, rashes, induration    Data Review:    Labs and Imaging:       CBC:  Recent Labs     25  0748 25  0321   WBC 12.9* 13.2*   HGB 11.5* 11.9   MCV 84.7 84.7   RDW 16.1* 16.8*    227       ANEMIA STUDIES:  No results for input(s): \"TIBC\", \"FERRITIN\", \"[id number]\", \"FOLATE\", \"OCCULTBLD\" in the last 72 hours.    Invalid input(s): \"LABIRON\"    BMP:  Recent Labs     25  0748 25  0321    138   K 4.0 3.7   CL 96* 94*   CO2 30 32*   BUN 58* 56*   CREATININE 2.8* 2.7*   GLUCOSE 105* 96   CALCIUM 8.6 8.8       LFTS:  Recent Labs     25  0748 25  0321   ALKPHOS 465* 450*   * 331*   * 85*   BILITOT 0.5 0.5       Other pertinent labs:  HIV nonreactive  EBV pending  SMA, AMA, ceruloplasmin, CMV IgM normal  Acute hepatitis panel nonreactive  AFP 8.0

## 2025-07-01 NOTE — CONSULTS
[facility]  Initial Consult Note  Date: 7/1/2025    Hospital day :7     Impression:   Acute on chronic hypoxic and hypercapnic respiratory failure requiring CPAP-resolved  Acute scattered pulmonary emboli  Severe pulmonary hypertension  Transaminitis  Hepatic steatosis  Non-ST elevation myocardial infarction  Acute on chronic congestive heart failure  Acute kidney injury    Recommendations   The HSV serology is barely positive and I highly doubt that the transaminitis is related to the HSV infection  The transaminitis is likely related to congestive hepatopathy in a patient with underlying severe pulmonary hypertension and the LFTs continue to improve  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 Positive HSV, elevated LFTs     History of Present Illness:   Sunita Watts is a 70 y.o.-year-old female who was initially admitted on 6/24/2025.     Sunita is seen and evaluated at bedside and her  is in the room with her at the time of my evaluation.  Sunita has coronary artery disease with cor pulmonale, COPD with a long history of smoking at least 40-pack-year, hyperlipidemia, depression    The patient was in her usual state of health and reports that about a week or 2 prior to admission had seen her cardiologist in follow-up and was felt to be doing okay.  The weekend prior to coming in she reports that she started to feel poorly just had generalized malaise/fatigue, nausea and vomiting but  intact; sclera anicteric; conjunctivae pink  ENT: Oropharynx clear, without erythema, exudate, or thrush.  Neck: Supple, without lymphadenopathy.   Pulmonary/Chest: Clear to auscultation, without wheezes, rales, or rhonchi  Cardiovascular: Regular rate and rhythm without murmurs, rubs, or gallops.   Abdomen: Soft, nontender, nondistended.  Extremities: No cyanosis, clubbing, edema, or effusions.  Neurologic: No gross sensory or motor deficits.    Skin: Warm and dry with no rash.    Medical Decision Making:   I have independently reviewed/ordered the following labs:  CBC with Differential:   Recent Labs     06/29/25  0748 06/30/25  0321   WBC 12.9* 13.2*   HGB 11.5* 11.9   HCT 38.8 40.5    227     BMP:   Recent Labs     06/30/25  0321 07/01/25  0736    139   K 3.7 3.0*   CL 94* 90*   CO2 32* 35*   BUN 56* 47*   CREATININE 2.7* 2.4*   MG  --  1.4*     Hepatic Function Panel:   Recent Labs     06/30/25  0321 07/01/25  0736   BILIDIR  --  0.4*   IBILI  --  0.3   BILITOT 0.5 0.7   ALKPHOS 450* 412*   * 258*   AST 85* 69*       Lab Results   Component Value Date/Time    PROCAL 0.25 06/24/2025 10:51 PM       No results found for: \"CRP\"  Lab Results   Component Value Date/Time    FERRITIN 38 06/25/2025 09:07 AM     No results found for: \"FIBRINOGEN\"  Lab Results   Component Value Date/Time    DDIMER 4.16 06/24/2025 01:54 PM     Lab Results   Component Value Date/Time     06/25/2025 12:13 AM       No results found for: \"SEDRATE\"    Lab Results   Component Value Date/Time    COVID19 Not Detected 06/24/2025 10:54 PM    COVID19 Not Detected 06/24/2025 01:47 PM     No results found for requested labs within last 30 days.     Component  Ref Range & Units (hover) 6/25/25 0907 6/25/25 0013   EBV VCA,IgM 37 23 CM   Comment:    REFERENCE RANGE:  Negative         <100 U/mL  Positive         >120 U/mL  Equivocal     100-120 U/mL   Resulting Agency [facility]

## 2025-07-01 NOTE — PLAN OF CARE
Problem: Discharge Planning  Goal: Discharge to home or other facility with appropriate resources  Outcome: Progressing     Problem: Safety - Adult  Goal: Free from fall injury  Outcome: Progressing     Problem: Respiratory - Adult  Goal: Achieves optimal ventilation and oxygenation  Outcome: Progressing     Problem: ABCDS Injury Assessment  Goal: Absence of physical injury  Outcome: Progressing     Problem: Skin/Tissue Integrity  Goal: Skin integrity remains intact  Description: 1.  Monitor for areas of redness and/or skin breakdown  2.  Assess vascular access sites hourly  3.  Every 4-6 hours minimum:  Change oxygen saturation probe site  4.  Every 4-6 hours:  If on nasal continuous positive airway pressure, respiratory therapy assess nares and determine need for appliance change or resting period  Outcome: Progressing     Problem: Chronic Conditions and Co-morbidities  Goal: Patient's chronic conditions and co-morbidity symptoms are monitored and maintained or improved  Outcome: Progressing

## 2025-07-01 NOTE — PROGRESS NOTES
@[facility] [email or web address]@    [facility]   IN-PATIENT SERVICE   [facility]    Progress Note    7/1/2025    12:48 PM    Name:   Sunita Watts  MRN:     [id number]     Acct:      [id number]   Room:   2005/2005-01  IP Day:  7  Admit Date:  6/24/2025 10:16 PM    PCP:   Kita Newton APRN - CNP  Code Status:  Full Code    Subjective:     C/C: Shortness of breath    Patient evaluated at bedside, states that she is feeling better.  No acute overnight events.    Brief History:     This is a 70-year-old female with past medical history of CAD s/p PCI LAD 2023 (C 03/2025 patent stent, nonobst dz) w/ cor pulmonale (echo 12/2024 +RV dysfunction w/ EF 55%, severe PTHN, mod TR; CTA PE 01/2025 neg PE) who has been struggling with subacute dyspnea over the past 8-12 months with acute worsening symptoms the past 1 to 2 weeks. patient presented to [place] on 06/24 with acute respiratory distress, acute hypoxic respiratory failure and recommended transfer to [facility] for the management of acute on chronic respiratory failure. Patient also having concern for acute renal failure. Nephrology was consulted and patient will need a renal biopsy.    Medications:     Allergies:  No Known Allergies    Current Meds:   Scheduled Meds:    furosemide  40 mg Oral BID    polyethylene glycol  17 g Oral Daily    ipratropium 0.5 mg-albuterol 2.5 mg  1 Dose Inhalation BID RT    budesonide-formoterol  2 puff Inhalation BID RT    metoprolol tartrate  25 mg Oral BID    [Held by provider] rosuvastatin  20 mg Oral Daily    sodium chloride flush  5-40 mL IntraVENous 2 times per day     Continuous Infusions:    sodium chloride       PRN Meds: sennosides-docusate sodium, albuterol, sodium chloride flush, sodium chloride, ondansetron **OR** ondansetron, acetaminophen **OR** acetaminophen, magnesium hydroxide, nitroGLYCERIN, benzonatate, heparin (porcine), heparin (porcine)    Data:     Past Medical History:

## 2025-07-01 NOTE — PROGRESS NOTES
PULMONARY & CRITICAL CARE MEDICINE PROGRESS NOTE     Patient:  Sunita Watts  MRN: [id number]  Admit date: 6/24/2025  Primary Care Physician: Kita Newton, APRN - CNP  Consulting Physician: Justin Cooper DO  CODE Status: Full Code   LOS: 7    HISTORY     CHIEF COMPLAINT/REASON FOR CONSULT:    Acute hypoxic respiratory failure likely on chronic/pulmonary hypertension/COPD.    HISTORY OF PRESENT ILLNESS:  The patient is a 70 y.o. female she has history of COPD severity not known no previous pulmonary function test available Long history of smoking until recently at least 40-pack-year, history of coronary artery disease status post PCI in 2023 last cardiac catheterization was March 2025, chronic diastolic heart failure.  According to patient she has been having gradual increasing shortness of breath for few months but especially in the last 1 week or so.  According patient she does not have cough with sputum production no fever no chills no diarrhea or nausea feels slight abdominal bloating but does complain of shortness of breath without change in his sputum or wheezing.  He came to emergency room and she was started on BiPAP initially and then after that BiPAP has been off and she is placed on high flow nasal cannula.    Her initial labs shows a BUN of 41 creatinine 1.5 bicarbonate was 33 proBNP was greater than 10,000 AST was 827 ALT was 958 total bili 2.2 alkaline phosphatase 459 WBC 12.3 hemoglobin 14.5 hematocrit 48.9 and platelet count is 283.  INR was 1.4 respiratory panel is negative flu is negative urinalysis shows WBC 20-50,  Initial blood gas was 7.4 2/60/36/39 bicarbonate.  CTA chest shows small subsegmental minimal lower lung pulm embolism per radiology no significant pleural effusion or consolidation was seen.    Her last echocardiogram on 12/27/2024 shows moderately reduced RV function RV was severely dilated moderate tricuspid regurgitation and severely elevated RVSP.      INTERVAL

## 2025-07-01 NOTE — PROGRESS NOTES
[facility].  [address]  [place] [address]  Phone: [phone]    Fax: [phone]  Dr. Abhijit Lipscomb, CANDIE Mclean NP      SUBJECTIVE    Patient was seen and examined, plan for kidney biopsy noted.  However most of her blood work is pending.  Her serum protein electrophoresis and free light chain ratios are not resulted yet.  Her PLA2R antibody is negative.  Her proteinuria which was initially 7 g repeat is 3.4 g and most recent is 1.34 g  Her creatinine is now showing a downward trend  Continue to have good urine output.  Her Plavix was on hold.    History:  This is a 70 y.o. female with PMHx of COPD ex tobacco use, CAD, cor pulmonale, depression who presents with progressive shortness of breath and fatigue.  She has been feeling dyspneic just walking 20 feet over the last few months but this has been worsening in the last 1 to 2 weeks associated with a nonproductive cough.  She has been diagnosed with a PE and is on heparin drip.  She denies any history of NSAID use or any kidney problems.  She denies nephrolithiasis.  Denies any recent infections. No supplement/OTC medication use aside from tylenol.  Baseline creatinine is 0.6-0.7 and is up to 1.7 mg/dL on the day of the consult. UPCR is 7.08 with no prior available for comparison.  She reports being up to date on her mammogram, no abnormal findings in the past. She has not had a colonoscopy or CT chest. CT a/p with IV contrast done on  showing c/f lytic lesions especially near L3. CT chest for PE protocol showed scattered small PE.  GI has been consulted for elevated LFTs.  OBJECTIVE      CURRENT TEMPERATURE:  Temp: 97.3 °F (36.3 °C)  MAXIMUM TEMPERATURE OVER 24HRS:  Temp (24hrs), Av.2 °F (36.8 °C), Min:97.3 °F (36.3 °C), Max:98.6 °F (37 °C)    CURRENT RESPIRATORY RATE:  Respirations: 16  CURRENT PULSE:  Pulse: 73  CURRENT BLOOD PRESSURE:  Results   Component Value Date/Time    MPO <0.3 06/25/2025 09:07 AM    .  PR3 ANCA:    Lab Results   Component Value Date/Time    PR3 <0.7 06/25/2025 09:07 AM   URINALYSIS:  U/A:   Lab Results   Component Value Date/Time    NITRU NEGATIVE 06/25/2025 01:24 AM    COLORU Yellow 06/25/2025 01:24 AM    PHUR 6.0 06/25/2025 01:24 AM    WBCUA 20 TO 50 06/25/2025 01:24 AM    RBCUA 5 TO 10 06/25/2025 01:24 AM    BACTERIA None 06/25/2025 01:24 AM    LEUKOCYTESUR NEGATIVE 06/25/2025 01:24 AM    UROBILINOGEN 1.0 06/25/2025 01:24 AM    BILIRUBINUR NEGATIVE 06/25/2025 01:24 AM    GLUCOSEU NEGATIVE 06/25/2025 01:24 AM    KETUA NEGATIVE 06/25/2025 01:24 AM     ANTIGBM:  Lab Results   Component Value Date/Time    GBMABIGG <1.9 06/25/2025 09:07 AM         RADIOLOGY      Reviewed as available.      ASSESSMENT      1.  Nonoliguric ALPA due to hypoperfusion state further complicated by use of contrast.  Her renal functions are improving and creatinine today is 2.7 after reaching a peak of 3.0 mg/dL  2. Nephrotic range proteinuria without nephrotic syndrome: Patient is not diabetic.  There is no prior history of renal disease.  With lytic lesion on CT scan there is a concern of paraproteinemia or multiple myeloma.  Her workup is still pending.  Albumin normal   UA with blood and wbc but many epi cells  HIV, hepatitis panel negative  Ddx includes multiple myeloma, minimal change disease, FSGS, amyloidosis     3. PE without right heart strain: May be needing anticoagulation as an outpatient  4.  Mixed metabolic alkalosis and chronic hypercapnia  5. Severe pulmonary hypertension, acute on chronic HFpEF, improved with IV Lasix  6.  Iron deficiency-hemoglobin is likely falsely high due to chronic hypoxia, anemia, ALPA,  6. COPD, AHRF, pulmonary hypertension    PLAN      1.  Will wait for the results of SPEP and free light chain   2.  Will ask hematology to see the patient for lytic lesion  3.  Continue Lasix to 80 mg p.o. bid  4.  Will cancel

## 2025-07-02 ENCOUNTER — APPOINTMENT (OUTPATIENT)
Dept: GENERAL RADIOLOGY | Age: 70
DRG: 286 | End: 2025-07-02
Attending: STUDENT IN AN ORGANIZED HEALTH CARE EDUCATION/TRAINING PROGRAM
Payer: MEDICARE

## 2025-07-02 VITALS
OXYGEN SATURATION: 97 % | HEIGHT: 61 IN | RESPIRATION RATE: 18 BRPM | WEIGHT: 128.31 LBS | DIASTOLIC BLOOD PRESSURE: 59 MMHG | HEART RATE: 82 BPM | BODY MASS INDEX: 24.22 KG/M2 | SYSTOLIC BLOOD PRESSURE: 122 MMHG | TEMPERATURE: 98.3 F

## 2025-07-02 LAB
ANCA MYELOPEROXIDASE: <0.3 AU/ML (ref 0–3.5)
ANCA PROTEINASE 3: <0.7 AU/ML (ref 0–2)
ANION GAP SERPL CALCULATED.3IONS-SCNC: 10 MMOL/L (ref 9–16)
BASOPHILS # BLD: 0.05 K/UL (ref 0–0.2)
BASOPHILS NFR BLD: 0 % (ref 0–2)
BUN SERPL-MCNC: 47 MG/DL (ref 8–23)
CALCIUM SERPL-MCNC: 8.8 MG/DL (ref 8.6–10.4)
CHLORIDE SERPL-SCNC: 90 MMOL/L (ref 98–107)
CO2 SERPL-SCNC: 38 MMOL/L (ref 20–31)
CREAT SERPL-MCNC: 2.1 MG/DL (ref 0.6–0.9)
CREAT UR-MCNC: 38.9 MG/DL (ref 28–217)
CREAT UR-MCNC: 41.2 MG/DL (ref 28–217)
EOSINOPHIL # BLD: 0.24 K/UL (ref 0–0.44)
EOSINOPHILS RELATIVE PERCENT: 2 % (ref 1–4)
ERYTHROCYTE [DISTWIDTH] IN BLOOD BY AUTOMATED COUNT: 18.2 % (ref 11.8–14.4)
GFR, ESTIMATED: 25 ML/MIN/1.73M2
GLUCOSE SERPL-MCNC: 92 MG/DL (ref 74–99)
HCT VFR BLD AUTO: 41.7 % (ref 36.3–47.1)
HGB BLD-MCNC: 12.4 G/DL (ref 11.9–15.1)
IMM GRANULOCYTES # BLD AUTO: 0.37 K/UL (ref 0–0.3)
IMM GRANULOCYTES NFR BLD: 3 %
LYMPHOCYTES NFR BLD: 0.76 K/UL (ref 1.1–3.7)
LYMPHOCYTES RELATIVE PERCENT: 7 % (ref 24–43)
MAGNESIUM SERPL-MCNC: 1.7 MG/DL (ref 1.6–2.4)
MCH RBC QN AUTO: 24.8 PG (ref 25.2–33.5)
MCHC RBC AUTO-ENTMCNC: 29.7 G/DL (ref 28.4–34.8)
MCV RBC AUTO: 83.4 FL (ref 82.6–102.9)
MONOCYTES NFR BLD: 1.4 K/UL (ref 0.1–1.2)
MONOCYTES NFR BLD: 12 % (ref 3–12)
NEUTROPHILS NFR BLD: 76 % (ref 36–65)
NEUTS SEG NFR BLD: 8.74 K/UL (ref 1.5–8.1)
NRBC BLD-RTO: 0 PER 100 WBC
PHOSPHATE SERPL-MCNC: 3.2 MG/DL (ref 2.5–4.5)
PLATELET # BLD AUTO: 248 K/UL (ref 138–453)
PMV BLD AUTO: 10.8 FL (ref 8.1–13.5)
POTASSIUM SERPL-SCNC: 3.3 MMOL/L (ref 3.7–5.3)
RBC # BLD AUTO: 5 M/UL (ref 3.95–5.11)
RBC # BLD: ABNORMAL 10*6/UL
SODIUM SERPL-SCNC: 138 MMOL/L (ref 136–145)
TOTAL PROTEIN, URINE: 38 MG/DL
TOTAL PROTEIN, URINE: 42 MG/DL
URINE TOTAL PROTEIN CREATININE RATIO: 0.98 (ref 0–0.2)
URINE TOTAL PROTEIN CREATININE RATIO: 1.02 (ref 0–0.2)
WBC OTHER # BLD: 11.6 K/UL (ref 3.5–11.3)

## 2025-07-02 PROCEDURE — 6370000000 HC RX 637 (ALT 250 FOR IP)

## 2025-07-02 PROCEDURE — 6370000000 HC RX 637 (ALT 250 FOR IP): Performed by: INTERNAL MEDICINE

## 2025-07-02 PROCEDURE — 85025 COMPLETE CBC W/AUTO DIFF WBC: CPT

## 2025-07-02 PROCEDURE — 2700000000 HC OXYGEN THERAPY PER DAY

## 2025-07-02 PROCEDURE — 84100 ASSAY OF PHOSPHORUS: CPT

## 2025-07-02 PROCEDURE — 82570 ASSAY OF URINE CREATININE: CPT

## 2025-07-02 PROCEDURE — 99232 SBSQ HOSP IP/OBS MODERATE 35: CPT | Performed by: INTERNAL MEDICINE

## 2025-07-02 PROCEDURE — 99239 HOSP IP/OBS DSCHRG MGMT >30: CPT

## 2025-07-02 PROCEDURE — 94640 AIRWAY INHALATION TREATMENT: CPT

## 2025-07-02 PROCEDURE — 83735 ASSAY OF MAGNESIUM: CPT

## 2025-07-02 PROCEDURE — 94761 N-INVAS EAR/PLS OXIMETRY MLT: CPT

## 2025-07-02 PROCEDURE — 6370000000 HC RX 637 (ALT 250 FOR IP): Performed by: STUDENT IN AN ORGANIZED HEALTH CARE EDUCATION/TRAINING PROGRAM

## 2025-07-02 PROCEDURE — 84156 ASSAY OF PROTEIN URINE: CPT

## 2025-07-02 PROCEDURE — 36415 COLL VENOUS BLD VENIPUNCTURE: CPT

## 2025-07-02 PROCEDURE — 80048 BASIC METABOLIC PNL TOTAL CA: CPT

## 2025-07-02 PROCEDURE — 2500000003 HC RX 250 WO HCPCS

## 2025-07-02 PROCEDURE — 71045 X-RAY EXAM CHEST 1 VIEW: CPT

## 2025-07-02 RX ORDER — FUROSEMIDE 40 MG/1
40 TABLET ORAL DAILY
Qty: 60 TABLET | Refills: 3 | Status: SHIPPED | OUTPATIENT
Start: 2025-07-03 | End: 2025-07-02

## 2025-07-02 RX ORDER — BUDESONIDE AND FORMOTEROL FUMARATE DIHYDRATE 160; 4.5 UG/1; UG/1
2 AEROSOL RESPIRATORY (INHALATION)
Qty: 2 EACH | Refills: 0 | Status: SHIPPED | OUTPATIENT
Start: 2025-07-02

## 2025-07-02 RX ORDER — POTASSIUM CHLORIDE 1500 MG/1
40 TABLET, EXTENDED RELEASE ORAL ONCE
Status: COMPLETED | OUTPATIENT
Start: 2025-07-02 | End: 2025-07-02

## 2025-07-02 RX ORDER — SPIRONOLACTONE 25 MG/1
12.5 TABLET ORAL DAILY
Status: DISCONTINUED | OUTPATIENT
Start: 2025-07-02 | End: 2025-07-02

## 2025-07-02 RX ORDER — FUROSEMIDE 40 MG/1
40 TABLET ORAL DAILY
Qty: 60 TABLET | Refills: 0 | Status: SHIPPED | OUTPATIENT
Start: 2025-07-03

## 2025-07-02 RX ADMIN — POTASSIUM CHLORIDE 40 MEQ: 1500 TABLET, EXTENDED RELEASE ORAL at 08:48

## 2025-07-02 RX ADMIN — CLOPIDOGREL BISULFATE 75 MG: 75 TABLET, FILM COATED ORAL at 08:11

## 2025-07-02 RX ADMIN — BUDESONIDE AND FORMOTEROL FUMARATE DIHYDRATE 2 PUFF: 160; 4.5 AEROSOL RESPIRATORY (INHALATION) at 08:16

## 2025-07-02 RX ADMIN — APIXABAN 10 MG: 5 TABLET, FILM COATED ORAL at 19:22

## 2025-07-02 RX ADMIN — SPIRONOLACTONE 12.5 MG: 25 TABLET, FILM COATED ORAL at 11:18

## 2025-07-02 RX ADMIN — IPRATROPIUM BROMIDE AND ALBUTEROL SULFATE 1 DOSE: .5; 2.5 SOLUTION RESPIRATORY (INHALATION) at 08:16

## 2025-07-02 RX ADMIN — SODIUM CHLORIDE, PRESERVATIVE FREE 10 ML: 5 INJECTION INTRAVENOUS at 08:14

## 2025-07-02 RX ADMIN — FUROSEMIDE 40 MG: 40 TABLET ORAL at 08:11

## 2025-07-02 RX ADMIN — METOPROLOL TARTRATE 25 MG: 25 TABLET, FILM COATED ORAL at 08:11

## 2025-07-02 RX ADMIN — ASPIRIN 81 MG: 81 TABLET, COATED ORAL at 08:11

## 2025-07-02 RX ADMIN — METOPROLOL TARTRATE 25 MG: 25 TABLET, FILM COATED ORAL at 19:22

## 2025-07-02 RX ADMIN — APIXABAN 10 MG: 5 TABLET, FILM COATED ORAL at 08:11

## 2025-07-02 NOTE — CARE COORDINATION
Case Management   Daily Progress Note       Patient Name: Sunita Watts                   YOB: 1955  Diagnosis: Acute on chronic respiratory failure (HCC) [J96.20]  NSTEMI (non-ST elevated myocardial infarction) ([facility]) [I21.4]  ALPA (acute kidney injury) [N17.9]  PE (pulmonary thromboembolism) ([facility]) [I26.99]                       GMLOS: 5.4 days  Length of Stay: 6  days    Anticipated Discharge Date: To be determined    Readmission Risk (Low < 19, Mod (19-27), High > 27): Readmission Risk Score: 15.4        Current Transitional Plan    [x] Home Independently    [] Home with HC    [] Skilled Nursing Facility    [] Acute Rehabilitation    [] Long Term Acute Care (LTAC)    [] Other:     Plan for the Stay (Medical Management) : renal biopsy tomorrow, weaning O2      Workflow Continuation (Additional Notes) : patient returning home with her . She may need O2. She denies other needs at this time        Suma Deutsch RN  June 30, 2025          
Case Management   Daily Progress Note       Patient Name: Sunita Watts                   YOB: 1955  Diagnosis: Acute on chronic respiratory failure (HCC) [J96.20]  NSTEMI (non-ST elevated myocardial infarction) ([facility]) [I21.4]  ALPA (acute kidney injury) [N17.9]  PE (pulmonary thromboembolism) ([facility]) [I26.99]                       GMLOS: 5.4 days  Length of Stay: 8  days    Anticipated Discharge Date: Ready for discharge    Readmission Risk (Low < 19, Mod (19-27), High > 27): Readmission Risk Score: 15.9        Current Transitional Plan    [x] Home Independently    [] Home with HC    [] Skilled Nursing Facility    [] Acute Rehabilitation    [] Long Term Acute Care (LTAC)    [] Other:     Plan for the Stay (Medical Management) :      Workflow Continuation (Additional Notes) : patient returning home with her . She qualifies for 2L O2. Order sent to [email or web address] via [employer]. Deyvi notified. Portable tank delivered to patient's room and patient instructed on use. Phone number provided for her to call for concentrator delivery. She is understanding. She denies other needs for home and has transportation home    Patient has a qualifying diagnosis for pulmonary rehabilitation.     Education provided to patient regarding the health benefits of participating in a pulmonary rehabilitation program. Handout provided with an overview of pulmonary  rehabilitation.     Patient agreeable: Yes    Freedom of choice provided.     Referral made to :   [] [facility]  [] [facility]  [] Other: Promedicjamila Deutsch RN  July 2, 2025          
and if so, who? (P) No  Plans to Return to Present Housing: (P) Yes  Other Identified Issues/Barriers to RETURNING to current housing: none  Potential Assistance needed at discharge: (P) N/A            Potential DME:    Patient expects to discharge to: (P) House  Plan for transportation at discharge:      Financial    Payor: MEDICARE / Plan: MEDICARE PART A AND B / Product Type: *No Product type* /     Does insurance require precert for SNF: No    Potential assistance Purchasing Medications: (P) No  Meds-to-Beds request: Yes      [facility] [address] [place] [address] [address] [facility] [phone] -  [phone]  [address] [facility] [address]  Phone: [phone] Fax: [phone]      Notes:    Factors facilitating achievement of predicted outcomes: Family support, Motivated, Cooperative, and Pleasant    Barriers to discharge: Medical complications    Additional Case Management Notes: Plan is home independently with , he will transport. Denies needs at this time    The Plan for Transition of Care is related to the following treatment goals of Acute on chronic respiratory failure (HCC) [J96.20]  NSTEMI (non-ST elevated myocardial infarction) (HCC) [I21.4]  ALPA (acute kidney injury) [N17.9]  PE (pulmonary thromboembolism) (HCC) [I26.99]    IF APPLICABLE: The Patient and/or patient representative Sunita and her family were provided with a choice of provider and agrees with the discharge plan. Freedom of choice list with basic dialogue that supports the patient's individualized plan of care/goals and shares the quality data associated with the providers was provided to:     Patient Representative Name:       The Patient and/or Patient Representative Agree with the Discharge Plan?      COBY MCLEOD RN  Case Management Department  Ph: [phone] Fax: [phone]

## 2025-07-02 NOTE — PROGRESS NOTES
Patient was evaluated today for the diagnosis of CHF.  I entered a DME order for home oxygen at 2 lpm because the diagnosis and testing require the patient to have supplemental oxygen.  Condition will improve or be benefited by oxygen use.  The patient is not able to perform good mobility in a home setting and therefore does require the use of a portable oxygen system.  The need for this equipment was discussed with the patient and she understands and is in agreement.

## 2025-07-02 NOTE — PROGRESS NOTES
[facility].  [address]  [place] [address]  Phone: [phone]    Fax: [phone]  Dr. Abhijit Lipscomb, CANDIE Mclean NP      SUBJECTIVE    Patient seen and examined at bedside, vital signs stable, afebrile, no acute events overnight.    Urine output documented at 2950 mL over the past 24 hours.  Lasix was reduced to 40 mg p.o. daily yesterday.    Labs reviewed  Recent Labs     25  0736 25  1709 25  0617    138 138   K 3.0* 3.4* 3.3*   CL 90* 88* 90*   CO2 35* 38* 38*   BUN 47* 45* 47*   CREATININE 2.4* 2.3* 2.1*   GLUCOSE 86 134* 92   CALCIUM 9.0 9.1 8.8     UPC down to 1.34 from 3.49.    History:  This is a 70 y.o. female with PMHx of COPD ex tobacco use, CAD, cor pulmonale, depression who presents with progressive shortness of breath and fatigue.  She has been feeling dyspneic just walking 20 feet over the last few months but this has been worsening in the last 1 to 2 weeks associated with a nonproductive cough.  She has been diagnosed with a PE and is on heparin drip.  She denies any history of NSAID use or any kidney problems.  She denies nephrolithiasis.  Denies any recent infections. No supplement/OTC medication use aside from tylenol.  Baseline creatinine is 0.6-0.7 and is up to 1.7 mg/dL on the day of the consult. UPCR is 7.08 with no prior available for comparison.  She reports being up to date on her mammogram, no abnormal findings in the past. She has not had a colonoscopy or CT chest. CT a/p with IV contrast done on  showing c/f lytic lesions especially near L3. CT chest for PE protocol showed scattered small PE.  GI has been consulted for elevated LFTs.  OBJECTIVE      CURRENT TEMPERATURE:  Temp: 98.2 °F (36.8 °C)  MAXIMUM TEMPERATURE OVER 24HRS:  Temp (24hrs), Av °F (36.7 °C), Min:97.3 °F (36.3 °C), Max:98.3 °F (36.8 °C)    CURRENT RESPIRATORY RATE:   metabolic alkalosis and chronic hypercapnia  5. Severe pulmonary hypertension, acute on chronic HFpEF, improved with IV Lasix  6.  Iron deficiency-hemoglobin is likely falsely high due to chronic hypoxia, anemia, ALPA,  6. COPD, AHRF, pulmonary hypertension    PLAN      Continue Lasix 40 mg p.o. as well as aldactone 12.5 mg daily.   Check CXR.  Replace potassium.  BMP daily.  Strict intake and output.  Daily weights.  Patient will need outpatient follow-up with Dr. Avilez in 1 to 2 weeks after discharge with weekly BMPs leading up to the appointment.  Nephrology will sign off.    BRET Peck-CNP  [facility]    Attending Physician Statement  I have discussed the care of Sunita Watts, including pertinent history and exam findings with the NP I have reviewed the key elements of all parts of the encounter with the np.  Note was updated and recorded changes were made I have seen and examined the patient with the np.  I agree with the assessment   Patient was seen and examined.  Her renal functions are improving.  Her proteinuria is down to 1.34.  Patient is on a stable dose of Lasix and chest x-ray shows mild bilateral pleural effusion and.  Her liver enzymes are also improving most likely hepatic congestion.  Next recommendations:  1.  Continue same dose of Lasix.  2.  Follow-up with nephrology in 2-3 4 weeks at [facility]  3.  BMP on Mondays and Thursdays for 2 weeks and fax results to [phone].  .  Ayad Avilez MD

## 2025-07-02 NOTE — PROGRESS NOTES
liver 6/25/2025 Yes    Pulmonary hypertension (HCC) 6/25/2025 Yes    COPD with acute exacerbation (HCC) 6/25/2025 Yes    Tobacco dependence 6/25/2025 Yes    Bone lesion 7/1/2025 Yes       Plan:        Acute on chronic respiratory failure  Acute on chronic HFpEF  Severe pulmonary hypertension  Small scattered pulmonary emboli  Status post RHC 6/27/2025  Continue Lasix  Continue oxygen supplementation to keep SpO2 greater than 90%  Transitioned to Eliquis yesterday  Pulmonology following, appreciate recommendations, PFTs outpatient    ALPA  Nephrology following, discussed case with them, will monitor kidney function for now, no plans of renal biopsy at this time  Creatinine stable    Hypokalemia  Electrolytes replaced    Elevated LFTs, improving  HSV positive  GI following, appreciate recommendations  ID consulted yesterday for HSV, they do not believe elevated LFTs are related to HSV, holding off on treatment given ALPA, appreciate recommendations    Other chronic medical conditions: CAD, COPD  - Home medications resumed selectively    VTE prophylaxis with Eliquis      Justin Cooper DO  7/2/2025  10:59 AM

## 2025-07-02 NOTE — PROGRESS NOTES
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  Do not anticipate complete resolution given underlying hepatic congestion.   Continue supportive care  Diet as tolerated  Avoid hepatotoxic agents  Daily labs  Will follow        This plan was formulated in collaboration with MD Makayla    Thank you for allowing me to participate in the care of your patient.  Please feel free to contact me with any questions or concerns.     Adebayo [facility]   [facility] Soriano, APRN - CNP   [phone]  7/2/2025  12:43 PM    Estimated time of  mins reviewing chart, assessing patient and formulating plan of care    This note was created with the assistance of a speech-recognition program.  Although the intention is to generate a document that actually reflects the content of the visit, no guarantees can be provided that every mistake has been identified and corrected by editing.     Attested:    I have discussed the care of Sunita Watts and I have examined the patient myselft and taken ros and hpi , including pertinent history and exam findings,  with the author of this note . I have reviewed the key elements of all parts of the encounter with the nurse practitioner/resident.  I agree with the assessment, plan and orders as documented by the above health care provider with the addendum made as necessary    More than 50% of the time was spent taking care of this patient in  addition to the nurse practitioner time.  That also included history taking follow-up physical examination and review of system.    Electronically signed by Yash Talbert MD        Xray Chest 1 View AP/PA

## 2025-07-02 NOTE — DISCHARGE SUMMARY
[email or web address]@    [facility]   IN-PATIENT SERVICE   [facility]    Discharge Summary     Patient ID: Sunita Watts  :  1955   MRN: [id number]     ACCOUNT:  [id number]   Patient's PCP: Kita Newton APRN - CNP  Admit Date: 2025   Discharge Date: 2025     Length of Stay: 8  Code Status:  Full Code  Admitting Physician: No admitting provider for patient encounter.  Discharge Physician: Justin Cooper DO     Active Discharge Diagnoses:     Hospital Problem Lists:  Principal Problem:    Acute on chronic respiratory failure (HCC)  Active Problems:    Transaminitis    Acute pulmonary embolism (HCC)    Primary hypertension    Acute on chronic diastolic congestive heart failure (HCC)    Nephrotic range proteinuria    ALPA (acute kidney injury)    Fatty liver    Pulmonary hypertension (HCC)    COPD with acute exacerbation (HCC)    Tobacco dependence    Bone lesion  Resolved Problems:    * No resolved hospital problems. *      Discharged Condition: stable    Hospital Stay:     Hospital Course:  Sunita Watts is a 70 y.o. female who was admitted for the management of   Acute on chronic respiratory failure (HCC) , presented to ER with No chief complaint on file.    This is a 70-year-old female with past medical history of CAD s/p PCI LAD , pulmonary hypertension, COPD, who presented with complaints of shortness of breath ongoing for the last 8 to 12 months and worsening in the last 1 to 2 weeks.  Patient initially presented to [place] on  with the symptoms and was found to be in acute respiratory distress, acute hypoxic respiratory failure and recommended transfer to [facility] for the management of acute on chronic respiratory failure.  Patient was found to have bilateral scattered small pulmonary emboli, no acute right heart strain.patient was also found to have an elevated creatinine, elevated LFTs, and elevated troponins.  Patient had

## 2025-07-02 NOTE — DISCHARGE INSTRUCTIONS
Continue taking Eliquis as prescribed  Follow-up with heme-onc outpatient, will need MRI of the lumbar spine  Continue Lasix, will need blood test basic metabolic panels on Mondays and Thursdays for 2 weeks, fax results to [phone]  Follow-up with nephrology in 2 to 3 weeks at [facility]  Will need outpatient PFTs, follow-up with pulmonology at the [place] [facility]  Follow-up with PCP within a week of discharge

## 2025-07-02 NOTE — PROGRESS NOTES
Patient discharged with  via wheelchair. Attached to personal oxygen at 3lpm and instructed about home oxygen. Belongings given. Night time meds given, home meds to be taken in the assigned pharmacy first thing in the morning.

## 2025-07-02 NOTE — PROGRESS NOTES
PULMONARY & CRITICAL CARE MEDICINE PROGRESS NOTE     Patient:  Sunita Watts  MRN: [id number]  Admit date: 6/24/2025  Primary Care Physician: Kita Newton, APRN - CNP  Consulting Physician: Justin Cooper DO  CODE Status: Full Code   LOS: 8    HISTORY     CHIEF COMPLAINT/REASON FOR CONSULT:    Acute hypoxic respiratory failure likely on chronic/pulmonary hypertension/COPD.    HISTORY OF PRESENT ILLNESS:  The patient is a 70 y.o. female she has history of COPD severity not known no previous pulmonary function test available Long history of smoking until recently at least 40-pack-year, history of coronary artery disease status post PCI in 2023 last cardiac catheterization was March 2025, chronic diastolic heart failure.  According to patient she has been having gradual increasing shortness of breath for few months but especially in the last 1 week or so.  According patient she does not have cough with sputum production no fever no chills no diarrhea or nausea feels slight abdominal bloating but does complain of shortness of breath without change in his sputum or wheezing.  He came to emergency room and she was started on BiPAP initially and then after that BiPAP has been off and she is placed on high flow nasal cannula.    Her initial labs shows a BUN of 41 creatinine 1.5 bicarbonate was 33 proBNP was greater than 10,000 AST was 827 ALT was 958 total bili 2.2 alkaline phosphatase 459 WBC 12.3 hemoglobin 14.5 hematocrit 48.9 and platelet count is 283.  INR was 1.4 respiratory panel is negative flu is negative urinalysis shows WBC 20-50,  Initial blood gas was 7.4 2/60/36/39 bicarbonate.  CTA chest shows small subsegmental minimal lower lung pulm embolism per radiology no significant pleural effusion or consolidation was seen.    Her last echocardiogram on 12/27/2024 shows moderately reduced RV function RV was severely dilated moderate tricuspid regurgitation and severely elevated RVSP.      INTERVAL  07/02/25  0617   WBC 13.2* 11.6*   HGB 11.9 12.4   HCT 40.5 41.7   MCV 84.7 83.4    248   LYMPHOPCT  --  7*   RBC 4.78 5.00   MCH 24.9* 24.8*   MCHC 29.4 29.7   RDW 16.8* 18.2*     BMP:   Recent Labs     07/01/25  0736 07/01/25  1709 07/02/25  0617    138 138   K 3.0* 3.4* 3.3*   CL 90* 88* 90*   CO2 35* 38* 38*   BUN 47* 45* 47*   CREATININE 2.4* 2.3* 2.1*   GLUCOSE 86 134* 92   MG 1.4*  --  1.7   PHOS  --   --  3.2     Liver Function Test:   Recent Labs     07/01/25  0736   *   AST 69*   ALKPHOS 412*   BILITOT 0.7     Amylase/Lipase:  No results for input(s): \"AMYLASE\", \"LIPASE\" in the last 72 hours.    Coagulation Profile:   No results for input(s): \"INR\", \"PROTIME\", \"APTT\" in the last 72 hours.      Cardiac Enzymes:  No results for input(s): \"CKTOTAL\", \"CKMB\", \"CKMBINDEX\", \"TROPONINI\" in the last 72 hours.  Lactic Acid:  Lab Results   Component Value Date    LACTA 2.0 06/24/2025     BNP:   No results found for: \"BNP\"  D-Dimer:  Lab Results   Component Value Date    DDIMER 4.16 (H) 06/24/2025     Others:   Lab Results   Component Value Date    TSH 0.60 06/25/2025    T4FREE 1.5 11/09/2023     Lab Results   Component Value Date    CECILIA NEGATIVE 06/25/2025     No results found for: \"LABURIC\", \"URICACID\"  Lab Results   Component Value Date    IRON 20 (L) 06/25/2025    TIBC 395 06/25/2025    FERRITIN 38 06/25/2025     No results found for: \"SPEP\", \"UPEP\"  No results found for: \"PSA\", \"CEA\", \"\", \"VZ7601\", \"\"  Microbiology:  No results for input(s): \"SPECDESC\", \"SPECIAL\", \"CULTURE\", \"STATUS\", \"ORG\", \"CDIFFTOXPCR\", \"CAMPYLOBPCR\", \"SALMONELLAPC\", \"SHIGAPCR\", \"SHIGELLAPCR\", \"MPNEUG\", \"MPNEUM\", \"LACTOQL\" in the last 72 hours.    Pathology:    Radiology Reports:  US RENAL COMPLETE   Final Result   1. Findings compatible with medical renal disease.  No hydronephrosis.   2. Poorly visualized urinary bladder with a Gottlieb catheter in place.         US ABDOMEN LIMITED W DOPPLER   Final Result

## 2025-07-02 NOTE — PLAN OF CARE
Problem: Discharge Planning  Goal: Discharge to home or other facility with appropriate resources  7/2/2025 1830 by Niecy Rodriguez RN  Outcome: Adequate for Discharge  7/2/2025 0638 by Capo Hart RN  Outcome: Progressing     Problem: Safety - Adult  Goal: Free from fall injury  7/2/2025 1830 by Niecy Rodriguez RN  Outcome: Adequate for Discharge  7/2/2025 0638 by Capo Hart RN  Outcome: Progressing     Problem: Respiratory - Adult  Goal: Achieves optimal ventilation and oxygenation  7/2/2025 1830 by Niecy Rodriguez RN  Outcome: Adequate for Discharge  7/2/2025 0819 by Kaye Gracia RCP  Outcome: Progressing  7/2/2025 0638 by Capo Hart RN  Outcome: Progressing     Problem: ABCDS Injury Assessment  Goal: Absence of physical injury  7/2/2025 1830 by Niecy Rodriguez RN  Outcome: Adequate for Discharge  7/2/2025 0638 by Capo Hart RN  Outcome: Progressing     Problem: Skin/Tissue Integrity  Goal: Skin integrity remains intact  Description: 1.  Monitor for areas of redness and/or skin breakdown  2.  Assess vascular access sites hourly  3.  Every 4-6 hours minimum:  Change oxygen saturation probe site  4.  Every 4-6 hours:  If on nasal continuous positive airway pressure, respiratory therapy assess nares and determine need for appliance change or resting period  7/2/2025 1830 by Niecy Rodriguez RN  Outcome: Adequate for Discharge  7/2/2025 0638 by Capo Hart RN  Outcome: Progressing     Problem: Chronic Conditions and Co-morbidities  Goal: Patient's chronic conditions and co-morbidity symptoms are monitored and maintained or improved  7/2/2025 1830 by Niecy Rodriguez RN  Outcome: Adequate for Discharge  7/2/2025 0638 by Capo Hart RN  Outcome: Progressing

## 2025-07-02 NOTE — PLAN OF CARE
Problem: Discharge Planning  Goal: Discharge to home or other facility with appropriate resources  Outcome: Progressing     Problem: Safety - Adult  Goal: Free from fall injury  Outcome: Progressing     Problem: Respiratory - Adult  Goal: Achieves optimal ventilation and oxygenation  7/2/2025 0638 by Capo Hart RN  Outcome: Progressing  7/1/2025 1948 by Abena Pace RCP  Outcome: Progressing     Problem: ABCDS Injury Assessment  Goal: Absence of physical injury  Outcome: Progressing     Problem: Skin/Tissue Integrity  Goal: Skin integrity remains intact  Description: 1.  Monitor for areas of redness and/or skin breakdown  2.  Assess vascular access sites hourly  3.  Every 4-6 hours minimum:  Change oxygen saturation probe site  4.  Every 4-6 hours:  If on nasal continuous positive airway pressure, respiratory therapy assess nares and determine need for appliance change or resting period  Outcome: Progressing     Problem: Chronic Conditions and Co-morbidities  Goal: Patient's chronic conditions and co-morbidity symptoms are monitored and maintained or improved  Outcome: Progressing

## 2025-07-02 NOTE — PLAN OF CARE
Problem: Respiratory - Adult  Goal: Achieves optimal ventilation and oxygenation  7/2/2025 0819 by Kaye Gracia RCP  Outcome: Progressing  7/2/2025 0638 by Capo Hart RN  Outcome: Progressing  7/1/2025 1948 by Abena aPce RCP  Outcome: Progressing

## 2025-07-02 NOTE — PROGRESS NOTES
RT Evaluation for Home Oxygen    Resting (Room Air):  SpO2: 86  HR: 84    Resting ( On o2)   Spo2: 93  O2 device: nasal cannula  L/min : 2 L/min         During Walk (Room Air):  SpO2: 84  HR: 98  Walk/Assistance Device: no    During walk ( on o2)  Spo2: 91  O2 device: nasal cannula   L/min: 2 L/min       Does the Patient Qualify for Home O2: yes with activity   Liter Flow at Rest: 2L   Liter Flow on Exertion: 2 L   Does the Patient Need Portable Oxygen Tanks: yes       Additional Comments: pt qualifies for oxygen at home at rest and with activity.     Electronically signed by Kaye Gracia RCP on 7/2/2025 at 4:42 PM

## 2025-07-02 NOTE — PROGRESS NOTES
Eval pulm edema. FINDINGS: LUNGS AND PLEURA: No focal pulmonary opacity. No pulmonary edema. Trace bilateral pleural effusions. No pneumothorax. HEART AND MEDIASTINUM: No acute abnormality of the cardiac and mediastinal silhouettes. BONES AND SOFT TISSUES: No acute osseous abnormality.     1. Trace bilateral pleural effusions.     Cardiac procedure  Result Date: 6/27/2025    Ultrasound guided 6F right IJ venous access.   Elevated right sided filling pressure   Moderately elevaed pulmonary pressures.   Normal cardiac output.   Negative vasoreactivity challenge test with inhaled nitric oxide.     Vascular duplex lower extremity venous bilateral  Result Date: 6/26/2025    Bilateral: No evidence of venous thrombosis.     Echo (TTE) complete (PRN contrast/bubble/strain/3D)  Result Date: 6/26/2025    Left Ventricle: Normal left ventricular systolic function. EF by visual approximation is 55%. Left ventricle size is normal. Normal wall thickness. Normal wall motion. Abnormal diastolic function.   Right Ventricle: Right ventricle is severely dilated. Reduced systolic function.   Mitral Valve: Mild regurgitation.   Tricuspid Valve: Moderate to severe regurgitation. Severely elevated RVSP, consistent with severe pulmonary hypertension. RVSP is 87 mmHg.   Right Atrium: Right atrium is severely dilated.   Image quality is good.     US ABDOMEN LIMITED W DOPPLER  Addendum Date: 6/25/2025  ADDENDUM: Upon review of the renal ultrasound, hepatic echogenicity likely mildly hyperechoic suggesting probable mild steatosis.  No marked steatosis suspected.     Result Date: 6/25/2025  EXAMINATION: RIGHT UPPER QUADRANT ULTRASOUND WITH DOPPLER ASSESSMENT HEPATIC VESSELS 6/25/2025 4:54 pm COMPARISON: CT scan of the abdomen and pelvis from 06/24/2025 HISTORY: ORDERING SYSTEM PROVIDED HISTORY: Elevated LFTs, hepatic steatosis noted on CT A&P, admitted with multiple PEs TECHNOLOGIST PROVIDED HISTORY: Include Doppler of Hepatic-Portal-Splenic  respiratory failure    Plan:  I reviewed the labs/imaging available to me,outside records and discussed with the patient.I explained to the patient the nature of this problem. I explained the significance of these abnormalities and possible etiology and management options  Nephrotic range proteinuria, improving.  May be related to pulmonary hypertension.    Urine immunofixation studies negative.  Free light chain ratio negative.  Immunoglobulin panel unremarkable.  Serum immunofixation studies negative  Suspicion for underlying plasma cell dyscrasia is low.   combination of normal free light chain ratio negative SPEP and negative immunofixation study has a combined sensitivity of 97%  However other malignancies causing bony lesions cannot be ruled out completely.  Will obtain MRI lumbar spine with and without contrast to further evaluate L3 bony lucency this can be done outpatient.  CT scans otherwise did not show any obvious evidence of malignancy.  Tolerating Eliquis  Will set up outpatient follow-up in [facility]    BRET Sarabia - CNP  [facility]  7/2/2025             This note is created with the assistance of a speech recognition program.  While intending to generate a document that actually reflects the content of the visit, the document can still have some errors including those of syntax and sound a like substitutions which may escape proof reading.  It such instances, actual meaning can be extrapolated by contextual diversion.

## 2025-07-03 ENCOUNTER — TELEPHONE (OUTPATIENT)
Dept: PRIMARY CARE CLINIC | Age: 70
End: 2025-07-03

## 2025-07-03 ENCOUNTER — CARE COORDINATION (OUTPATIENT)
Dept: CARE COORDINATION | Age: 70
End: 2025-07-03

## 2025-07-03 DIAGNOSIS — I26.99 ACUTE PULMONARY EMBOLISM, UNSPECIFIED PULMONARY EMBOLISM TYPE, UNSPECIFIED WHETHER ACUTE COR PULMONALE PRESENT (HCC): Primary | ICD-10-CM

## 2025-07-03 NOTE — TELEPHONE ENCOUNTER
Care coordinator called in stating the patient left her a message stating that her eliquis is over 500 dollars, patient stated she will not be able to afford that. Care coordinator wonders if she would be able to go to the coumadin clinic to maybe help with this. Please advise.

## 2025-07-03 NOTE — CARE COORDINATION
Care Transitions Note    Initial Call - Call within 2 business days of discharge: Yes    Patient Current Location:  Home: [address] [place] [address]    Care Transition Nurse contacted the patient by telephone to perform post hospital discharge assessment, verified name and  as identifiers.  Provided introduction to self, and explanation of the Care Transition Nurse role.    Patient: Sunita Watts    Patient : 1955   MRN: [id number]    Reason for Admission: acute PE  Discharge Date: 25  RURS: Readmission Risk Score: 16.4      Last Discharge Facility       Date Complaint Diagnosis Description Type Department Provider    25  Acute pulmonary embolism, unspecified pulmonary embolism type, unspecified whether acute cor pulmonale present (HCC) ... Admission (Discharged) [facility] Justin Cooper DO    25 Shortness of Breath Acute on chronic respiratory failure with hypoxia and hypercapnia (HCC) ... ED (TRANSFER) [facility] Shilpa Walker DO; [place]...            Was this an external facility discharge? No    Additional needs identified to be addressed with provider   High priority: Patient provided O2 tank for home discharge has new order, this has run out, DME is [employer] they not able to secure order for timely delivery of concentrator. It sounds like order went to [place] location. Patient feeling short of breath, sp02 87%, I advised ED, spouse states will likely take patient to [place].     UPDATE Patient not able to get [facility] home pharmacy showing too early to fill. I have verified this was not filled through [place] and not sure why it won't pay. May want to try to send new script for this med.              Method of communication with provider: chart routing.    Patients top risk factors for readmission: medical condition-PE, CAD, HLD, COPD     Interventions to address risk factors:   Education: advised ED/ 911    Care Summary Note: Patient and spouse

## 2025-07-03 NOTE — CARE COORDINATION
Received VM from patient states Eliquis will be unaffordable.     Call to [facility], spoke with Mary Kate, updated on patient VM will not be able to get Eliquis, inquired on alternative therapy, reviewed patient is already currently taking Plavix.PCP HFU is scheduled for 7/11. Requested follow up with patient.     Call back to patient reviewed VM, states Eliquis will be close to $500/ mo. Discussed need for timely treatment, CTN has forwarded concern to PCP. Advised ED for shortness of breath, chest pain, bloody sputum.

## 2025-07-03 NOTE — TELEPHONE ENCOUNTER
Care Transitions Initial Follow Up Call    Outreach made within 2 business days of discharge: Yes    Patient: Sunita Watts Patient : 1955   MRN: [id number]  Reason for Admission: Acute on chronic respiratory failure   Discharge Date: 25       Spoke with: patient    Discharge department/facility: [facility] Interactive Patient Contact:  Was patient able to fill all prescriptions: Yes  Was patient instructed to bring all medications to the follow-up visit: Yes  Is patient taking all medications as directed in the discharge summary? Yes  Does patient understand their discharge instructions: Yes  Does patient have questions or concerns that need addressed prior to 7-14 day follow up office visit: no    Additional needs identified to be addressed with provider  No needs identified             Scheduled appointment with PCP within 7-14 days    Follow Up  Future Appointments   Date Time Provider Department Center   2025  3:45 PM Komal Samuels MD [facility] DEP   2025  1:00 PM Kita Newton APRN - CNP [facility] DEP   2025  2:45 PM Yoly Coleman, APRN - CNP [facility]       Suzanne Starr MA

## 2025-07-07 ENCOUNTER — CARE COORDINATION (OUTPATIENT)
Dept: CARE COORDINATION | Age: 70
End: 2025-07-07

## 2025-07-07 ENCOUNTER — TELEPHONE (OUTPATIENT)
Dept: CARDIOLOGY | Age: 70
End: 2025-07-07

## 2025-07-07 ENCOUNTER — TELEPHONE (OUTPATIENT)
Age: 70
End: 2025-07-07

## 2025-07-07 DIAGNOSIS — I26.99 PULMONARY EMBOLISM, UNSPECIFIED CHRONICITY, UNSPECIFIED PULMONARY EMBOLISM TYPE, UNSPECIFIED WHETHER ACUTE COR PULMONALE PRESENT (HCC): Primary | ICD-10-CM

## 2025-07-07 NOTE — TELEPHONE ENCOUNTER
Pt was in hospital recently for PE and post cath. Pt cannot afford Eliquis. Requesting Warfarin.     Please sign pended order    Last Appt:  6/18/2025  Next Appt:   12/17/2025  Med verified in Epic

## 2025-07-07 NOTE — TELEPHONE ENCOUNTER
[facility] Management [facility] Note  Received call from Sheeba, care coordinator with [facility], regarding new referral for warfarin management. She states patient may live closer to [place] and choose to go to their medication management clinic for warfarin management - she is reaching out to patient and will let us know plans. Advised if patient does want to go to [facility] medication management, this is no problem, we can update referral to reflect this.    Claire Clemente, PharmD  [facility]  7/7/2025 9:00 AM

## 2025-07-07 NOTE — TELEPHONE ENCOUNTER
Called Sunita to schedule initial visit for medication management clinic for anticoag monitoring/management. Scheduled for this Thursday 07/10/25. Called rx for warfarin 2.5 mg tablets  with instructions to take two tablets daily x 2 days then one tablet daily and enoxaparin 60 mg syringes with instructions for one syringe daily due to renal function.     Po Mina RPh  7/7/2025  12:10 PM

## 2025-07-07 NOTE — TELEPHONE ENCOUNTER
[facility] Medication Management Clinic Note  Received staff message from [employer]. Patient requesting referral be switched to [place] location. Updated referral. Routing to clinical staff pool for [place] [facility].    Claire Clemente PharmD  [facility]  7/7/2025 10:29 AM

## 2025-07-07 NOTE — CARE COORDINATION
Contacted [facility], discussed referral for [place] location, may have referral transferred. [facility] spoke with Claire, confirmed referral, discussed CTN will contact patient to discuss preference message via Epic if needs referral transferred.     Call to patient's number, spouse answered, HIPAA confirmed, updated on med management referral and need to follow up today, contact for [place] location provided. Encouraged to call within the hour. Agrees to follow up later in week.     Epic message to Claire, updated on patient preference for [place] location.    VM from Po with [place] [facility], requests referral from patient's cardiology office, Dr. Spears.    Call to [facility], spoke with Luz, referral has been sent by office for med management, reviewed patient IP stay, discharge, CTN will contact patient have them call to schedule office appt.    Call to patient and updated on above, reviewed cardiology appt request and reviewed office number.

## 2025-07-08 ENCOUNTER — TELEPHONE (OUTPATIENT)
Dept: PHARMACY | Age: 70
End: 2025-07-08

## 2025-07-08 ENCOUNTER — HOSPITAL ENCOUNTER (OUTPATIENT)
Age: 70
Setting detail: SPECIMEN
Discharge: HOME | End: 2025-07-08
Payer: MEDICARE

## 2025-07-08 DIAGNOSIS — N17.9 AKI (ACUTE KIDNEY INJURY): ICD-10-CM

## 2025-07-08 LAB
ANION GAP SERPL CALCULATED.3IONS-SCNC: 7 MMOL/L (ref 9–16)
BUN SERPL-MCNC: 23 MG/DL (ref 8–23)
BUN/CREAT SERPL: 19 (ref 9–20)
CALCIUM SERPL-MCNC: 9.6 MG/DL (ref 8.6–10.4)
CHLORIDE SERPL-SCNC: 96 MMOL/L (ref 98–107)
CO2 SERPL-SCNC: 40 MMOL/L (ref 20–31)
CREAT SERPL-MCNC: 1.2 MG/DL (ref 0.6–0.9)
GFR, ESTIMATED: 48 ML/MIN/1.73M2
GLUCOSE SERPL-MCNC: 85 MG/DL (ref 74–99)
POTASSIUM SERPL-SCNC: 3 MMOL/L (ref 3.7–5.3)
SODIUM SERPL-SCNC: 143 MMOL/L (ref 136–145)

## 2025-07-08 PROCEDURE — 80048 BASIC METABOLIC PNL TOTAL CA: CPT

## 2025-07-08 NOTE — TELEPHONE ENCOUNTER
spoke with patient again about dosing...for warfarin and lovenox. patient understood directions by read back verification

## 2025-07-10 ENCOUNTER — ANTI-COAG VISIT (OUTPATIENT)
Age: 70
End: 2025-07-10
Payer: MEDICARE

## 2025-07-10 DIAGNOSIS — I26.01 ACUTE SEPTIC PULMONARY EMBOLISM WITH ACUTE COR PULMONALE (HCC): Primary | ICD-10-CM

## 2025-07-10 LAB
INTERNATIONAL NORMALIZATION RATIO, POC: 1.2
PROTHROMBIN TIME, POC: 0

## 2025-07-10 PROCEDURE — 85610 PROTHROMBIN TIME: CPT

## 2025-07-10 PROCEDURE — 99202 OFFICE O/P NEW SF 15 MIN: CPT

## 2025-07-10 RX ORDER — ENOXAPARIN SODIUM 100 MG/ML
INJECTION SUBCUTANEOUS
COMMUNITY
Start: 2025-07-08

## 2025-07-10 RX ORDER — WARFARIN SODIUM 2.5 MG/1
TABLET ORAL
COMMUNITY
Start: 2025-07-07

## 2025-07-10 NOTE — PROGRESS NOTES
ANTICOAGULATION SERVICE    Date of Clinic Visit:  7/10/2025    Sunita Watts is a 70 y.o. female who presents to clinic today for anticoagulation monitoring and adjustment.    Recent INR Results:  Internal QC passed  Lab Results   Component Value Date    INR 1.2 07/10/2025    INR 1.2 06/29/2025       Current Warfarin Dosage:  Dosing Plan  As of 7/10/2025      TTR:  --   Full warfarin instructions:  7/10: 7.5 mg; 7/12: 7.5 mg; Otherwise 5 mg every day                 Assessment/Plan:    Modify warfarin dose as noted above: Patient is well below target.  Will boost dose and re-check Monday 7/14/25.      Next Clinic Appointment:  Return date  As of 7/10/2025      TTR:  --   Next INR check:  7/14/2025               Please call [facility] Anticoagulation Clinic at [phone] with any questions.     Thanks!  Tamar Mcgovern [facility]  Anticoagulation Service Pharmacist  7/10/2025 3:29 PM  For Pharmacy Admin Tracking Only    Intervention Detail: Dose Adjustment: 1, reason: Therapy Optimization  Total # of Interventions Recommended: 1  Total # of Interventions Accepted: 1  Time Spent (min): 30

## 2025-07-11 ENCOUNTER — CARE COORDINATION (OUTPATIENT)
Dept: CARE COORDINATION | Age: 70
End: 2025-07-11

## 2025-07-11 ENCOUNTER — OFFICE VISIT (OUTPATIENT)
Dept: PRIMARY CARE CLINIC | Age: 70
End: 2025-07-11

## 2025-07-11 VITALS
WEIGHT: 116.2 LBS | HEART RATE: 71 BPM | DIASTOLIC BLOOD PRESSURE: 60 MMHG | OXYGEN SATURATION: 97 % | BODY MASS INDEX: 21.97 KG/M2 | SYSTOLIC BLOOD PRESSURE: 118 MMHG

## 2025-07-11 DIAGNOSIS — R79.89 ELEVATED LFTS: ICD-10-CM

## 2025-07-11 DIAGNOSIS — K76.0 FATTY LIVER: ICD-10-CM

## 2025-07-11 DIAGNOSIS — R74.01 TRANSAMINITIS: ICD-10-CM

## 2025-07-11 DIAGNOSIS — M89.9 BONE LESION: ICD-10-CM

## 2025-07-11 DIAGNOSIS — R93.7 ABNORMAL CT OF SPINE: ICD-10-CM

## 2025-07-11 DIAGNOSIS — J96.21 ACUTE ON CHRONIC RESPIRATORY FAILURE WITH HYPOXIA (HCC): ICD-10-CM

## 2025-07-11 DIAGNOSIS — J44.1 COPD WITH ACUTE EXACERBATION (HCC): ICD-10-CM

## 2025-07-11 DIAGNOSIS — I26.99 OTHER ACUTE PULMONARY EMBOLISM WITHOUT ACUTE COR PULMONALE (HCC): ICD-10-CM

## 2025-07-11 DIAGNOSIS — Z09 HOSPITAL DISCHARGE FOLLOW-UP: Primary | ICD-10-CM

## 2025-07-11 DIAGNOSIS — N17.9 AKI (ACUTE KIDNEY INJURY): ICD-10-CM

## 2025-07-11 RX ORDER — GUAIFENESIN 600 MG/1
1200 TABLET, EXTENDED RELEASE ORAL 2 TIMES DAILY
Qty: 40 TABLET | Refills: 0 | Status: SHIPPED | OUTPATIENT
Start: 2025-07-11 | End: 2025-07-21

## 2025-07-11 RX ORDER — BENZOCAINE/MENTH/CETYLPYRD CL 15 MG-2 MG
1 LOZENGE MUCOUS MEMBRANE 3 TIMES DAILY PRN
Qty: 30 LOZENGE | Refills: 0 | Status: SHIPPED | OUTPATIENT
Start: 2025-07-11

## 2025-07-11 NOTE — CARE COORDINATION
Care Transitions Note    Follow Up Call     Patient Current Location:  Home: [address] [place], [place] [address] [id number]    Care Transition Nurse contacted the patient by telephone. Verified name and  as identifiers.    Additional needs identified to be addressed with provider   High priority: patient unable to obtain Spiriva, symbicort due to high copay. I have sent referral to med . She has ongoing cough, confirms she has nebulizer and meds in home, encouraged use.                  Method of communication with provider: chart routing.    Care Summary Note: Spoke with patient, she has cough, encouraged not to suppress, she will see PCP today. States did not get spiriva, CTN had verified was in process. States too early to fill and could not get  Conference call to [facility], spoke with pharmacist Dat rodriguez Spriva pays $430 with insurance, Symbicort copay $90 copay. Patient cannot afford and pharmacy will hold.    Patient has not scheduled with oncology or nephrology, provided with office contact numbers.     Referral for med assistance sent. No further questions or concerns, agrees to follow up next week.         Plan of care updates since last contact:  Review of patient management of conditions/medications: follow up       Advance Care Planning:   Does patient have an Advance Directive: patient declined education.    Medication Review:  Full medication review completed during previous call.    Remote Patient Monitoring:  Offered patient enrollment in the Remote Patient Monitoring (RPM) program for in-home monitoring: Deferred at this time because not discussed; will discuss at next outreach.    Assessments:  Care Transitions Subsequent and Final Call    Subsequent and Final Calls  Care Transitions Interventions  Other Interventions:              Follow Up Appointment:   Reviewed upcoming appointment(s).  Future Appointments         Provider Specialty Dept Phone    2025 3:45 PM [place]

## 2025-07-11 NOTE — CARE COORDINATION
Spoke with the patient and we are going to try to get her Spiriva through the PAP program. I was able to fax the provider and mail the patient the information.      Maeve Sanz [facility]  CC   Medication Assistance  [employer] [employer]    (P) [phone]  (F) [phone]

## 2025-07-11 NOTE — PROGRESS NOTES
Post-Discharge Transitional Care  Follow Up      Sunita Watts   YOB: 1955    Date of Office Visit:  7/11/2025  Date of Hospital Admission: 6/24/25  Date of Hospital Discharge: 7/2/25  Risk of hospital readmission (high >=14%. Medium >=10%) :Readmission Risk Score: 16.4      Care management risk score Rising risk (score 2-5) and Complex Care (Scores >=6): No Risk Score On File     Non face to face  following discharge, date last encounter closed (first attempt may have been earlier): 07/03/2025    Call initiated 2 business days of discharge: Yes    ASSESSMENT/PLAN:   Hospital discharge follow-up  -     [place] DISCHARGE MEDS RECONCILED W/ CURRENT OUTPATIENT MED LIST  Bone lesion  Comments:  Check MRI lumbar and iliac bones  Other acute pulmonary embolism without acute cor pulmonale (HCC)  Comments:  Could not afford Eliquis, currently on Coumadin INR subtherapeutic patient is on bridging Lovenox managed by pharmacy  COPD with acute exacerbation (HCC)  Comments:  Still complaining of cough, try Mucinex, Cepacol  Elevated LFTs  -     Fred Oliva MD, Gastroenterology, [place]  Abnormal CT of spine  -     MRI LUMBAR SPINE W WO CONTRAST; Future  -     MRI PELVIS W WO CONTRAST; Future  Acute on chronic respiratory failure with hypoxia (HCC)  ALPA (acute kidney injury)  Comments:  Improving, recent labs reviewed  Fatty liver  Transaminitis      Medical Decision Making: high complexity  No follow-ups on file.           Subjective:   HPI:  Follow up of Hospital problems/diagnosis(es):   Principal Problem:    Acute on chronic respiratory failure (HCC)  Active Problems:    Transaminitis    Acute pulmonary embolism (HCC)    Primary hypertension    Acute on chronic diastolic congestive heart failure (HCC)    Nephrotic range proteinuria    ALPA (acute kidney injury)    Fatty liver    Pulmonary hypertension (HCC)    COPD with acute exacerbation (HCC)    Tobacco dependence    Bone lesion  Inpatient

## 2025-07-14 ENCOUNTER — TELEPHONE (OUTPATIENT)
Dept: GASTROENTEROLOGY | Age: 70
End: 2025-07-14

## 2025-07-14 ENCOUNTER — ANTI-COAG VISIT (OUTPATIENT)
Age: 70
End: 2025-07-14
Payer: MEDICARE

## 2025-07-14 DIAGNOSIS — I26.99 OTHER ACUTE PULMONARY EMBOLISM WITHOUT ACUTE COR PULMONALE (HCC): Primary | ICD-10-CM

## 2025-07-14 LAB
INTERNATIONAL NORMALIZATION RATIO, POC: 2.3
PROTHROMBIN TIME, POC: 0

## 2025-07-14 PROCEDURE — 99212 OFFICE O/P EST SF 10 MIN: CPT

## 2025-07-14 PROCEDURE — 85610 PROTHROMBIN TIME: CPT

## 2025-07-14 NOTE — PROGRESS NOTES
ANTICOAGULATION SERVICE    Date of Clinic Visit:  7/14/2025    Sunita Watts is a 70 y.o. female who presents to clinic today for anticoagulation monitoring and adjustment.    Recent INR Results:  Internal QC passed  Lab Results   Component Value Date    INR 2.3 07/14/2025    INR 1.2 07/10/2025       Current Warfarin Dosage:  Dosing Plan  As of 7/14/2025      TTR:  --   Full warfarin instructions:  5 mg every day                 Assessment/Plan:    Continue current regimen as INR remains stable. INR is in range today. Sunita is 7 days into start of warfarin therapy. I will decrease weekly dose just a little from what she has received the last 4 days since INR increased from 1.2 to 2.3 in that time. I instructed Sunita to continue enoxaparin x 1 more day then stop. Recheck in 4 days. Sunita has no questions regarding warfarin at this time.     Next Clinic Appointment:  Return date  As of 7/14/2025      TTR:  --   Next INR check:  7/18/2025               Please call [facility] Anticoagulation Clinic at [phone] with any questions.     Thanks!  Carlos Mina [facility]  Anticoagulation Service Pharmacist  7/14/2025 3:04 PM    For Pharmacy Admin Tracking Only    Intervention Detail: Dose Adjustment: 1, reason: Therapy Optimization  Total # of Interventions Recommended: 1  Total # of Interventions Accepted: 1  Time Spent (min): 20

## 2025-07-14 NOTE — TELEPHONE ENCOUNTER
attempt 1 lvm for pt to schedule appt established with zeina  attempt 2 letter sent    Patient needs to be seen for elevated lfts'

## 2025-07-15 ENCOUNTER — TELEPHONE (OUTPATIENT)
Age: 70
End: 2025-07-15

## 2025-07-15 NOTE — TELEPHONE ENCOUNTER
Patients spouse called and was under the impression that patient needed to have lab work done repeatedly until had follow up. Patient did complete protein/creatinine ratio (urine) and thought this was to be done until patient follows up. Please advise and let patient or spouse know.

## 2025-07-16 ENCOUNTER — OFFICE VISIT (OUTPATIENT)
Dept: CARDIOLOGY | Age: 70
End: 2025-07-16
Payer: MEDICARE

## 2025-07-16 VITALS
DIASTOLIC BLOOD PRESSURE: 68 MMHG | SYSTOLIC BLOOD PRESSURE: 134 MMHG | HEART RATE: 86 BPM | BODY MASS INDEX: 21.52 KG/M2 | OXYGEN SATURATION: 95 % | HEIGHT: 61 IN | WEIGHT: 114 LBS

## 2025-07-16 DIAGNOSIS — I10 PRIMARY HYPERTENSION: ICD-10-CM

## 2025-07-16 DIAGNOSIS — I50.30 HEART FAILURE WITH PRESERVED EJECTION FRACTION, UNSPECIFIED HF CHRONICITY (HCC): ICD-10-CM

## 2025-07-16 DIAGNOSIS — I25.10 CORONARY ARTERY DISEASE INVOLVING NATIVE CORONARY ARTERY OF NATIVE HEART WITHOUT ANGINA PECTORIS: Primary | ICD-10-CM

## 2025-07-16 PROCEDURE — 1090F PRES/ABSN URINE INCON ASSESS: CPT | Performed by: NURSE PRACTITIONER

## 2025-07-16 PROCEDURE — G8427 DOCREV CUR MEDS BY ELIG CLIN: HCPCS | Performed by: NURSE PRACTITIONER

## 2025-07-16 PROCEDURE — 1123F ACP DISCUSS/DSCN MKR DOCD: CPT | Performed by: NURSE PRACTITIONER

## 2025-07-16 PROCEDURE — 93010 ELECTROCARDIOGRAM REPORT: CPT | Performed by: NURSE PRACTITIONER

## 2025-07-16 PROCEDURE — 1111F DSCHRG MED/CURRENT MED MERGE: CPT | Performed by: NURSE PRACTITIONER

## 2025-07-16 PROCEDURE — 93005 ELECTROCARDIOGRAM TRACING: CPT | Performed by: NURSE PRACTITIONER

## 2025-07-16 PROCEDURE — 1126F AMNT PAIN NOTED NONE PRSNT: CPT | Performed by: NURSE PRACTITIONER

## 2025-07-16 PROCEDURE — 1036F TOBACCO NON-USER: CPT | Performed by: NURSE PRACTITIONER

## 2025-07-16 PROCEDURE — G8400 PT W/DXA NO RESULTS DOC: HCPCS | Performed by: NURSE PRACTITIONER

## 2025-07-16 PROCEDURE — 3017F COLORECTAL CA SCREEN DOC REV: CPT | Performed by: NURSE PRACTITIONER

## 2025-07-16 PROCEDURE — 3075F SYST BP GE 130 - 139MM HG: CPT | Performed by: NURSE PRACTITIONER

## 2025-07-16 PROCEDURE — 1159F MED LIST DOCD IN RCRD: CPT | Performed by: NURSE PRACTITIONER

## 2025-07-16 PROCEDURE — G8420 CALC BMI NORM PARAMETERS: HCPCS | Performed by: NURSE PRACTITIONER

## 2025-07-16 PROCEDURE — 99214 OFFICE O/P EST MOD 30 MIN: CPT | Performed by: NURSE PRACTITIONER

## 2025-07-16 PROCEDURE — 99213 OFFICE O/P EST LOW 20 MIN: CPT | Performed by: NURSE PRACTITIONER

## 2025-07-16 PROCEDURE — 3078F DIAST BP <80 MM HG: CPT | Performed by: NURSE PRACTITIONER

## 2025-07-16 NOTE — PROGRESS NOTES
Cardiology Consultation/Follow Up.  [facility]    Cardiology Follow Up.      Sunita Watts  1955  [id number]    Today: 7/16/25    CC: Patient is here for follow up    HPI:   Sunita Watts is here for a follow up. Patient with a history of hypertension. Patient is here for a 6 month follow up.     Patient seen and examined in office today. Denies anginal type chest pain. Reports improving shortness of breath and dyspnea on exertion. No lightheadedness, dizziness, nausea/vomiting, diaphoresis, or palpitations. Reports complete medication compliance. No noticeable peripheral edema. No unexplained sudden weight gain. Medical history reviewed.       Past Medical:  Past Medical History:   Diagnosis Date    COPD (chronic obstructive pulmonary disease) ([facility]) 2019    Depression          Past Surgical:  Past Surgical History:   Procedure Laterality Date    CARDIAC CATHETERIZATION  03/25/2025    DR. EPPS    CARDIAC PROCEDURE N/A 12/08/2023    dixon / Left heart cath / coronary angiography performed by Geetha La MD at [facility] CARDIAC CATH LAB    CARDIAC PROCEDURE N/A 12/08/2023    Percutaneous coronary intervention performed by Geetha La MD at [facility] CARDIAC CATH LAB    CARDIAC PROCEDURE N/A 3/25/2025    juan jose / Left heart cath / coronary angiography performed by Inés Epps MD at [facility] CARDIAC CATH LAB    CARDIAC PROCEDURE N/A 6/27/2025    Right heart cath performed by Ayad Spears MD at [facility] CARDIAC CATH LAB    EYE SURGERY  Cataract    INVASIVE VASCULAR N/A 3/25/2025    Ultrasound guided vascular access performed by Inés Epps MD at [facility] CARDIAC CATH LAB         Family History:  Family History   Problem Relation Age of Onset    Unknown Mother     Unknown Father        Social History:  Social History     Socioeconomic History    Marital status:      Spouse name: ivy watts    Number of children: Not on file    Years of education: Not on file    Highest

## 2025-07-17 ENCOUNTER — CARE COORDINATION (OUTPATIENT)
Dept: CARE COORDINATION | Age: 70
End: 2025-07-17

## 2025-07-17 NOTE — CARE COORDINATION
Care Transitions Note    Follow Up Call     Attempted to reach spouse/partner  for transitions of care follow up.  Unable to reach spouse/partner .      Outreach Attempts:   HIPAA compliant voicemail left for spouse/partner .     Care Summary Note: First attempt follow up, left VM    Follow Up Appointment:   Future Appointments         Provider Specialty Dept Phone    7/18/2025 11:45 AM [facility] [phone]    7/23/2025 1:00 PM Kita Newton, APRN - CNP Primary Care [phone]    8/20/2025 2:00 PM SCHEDULE, [facility] P[facility] Pulmonary Function Testing [phone]    8/20/2025 3:00 PM Adam Perkins MD Pulmonology [phone]    9/29/2025 9:45 AM Fred Trinh MD Gastroenterology [phone]    12/17/2025 2:45 PM Yoly Coleman, APRN - CNP Cardiology [phone]            Plan for follow-up call in 2-5 days based on severity of symptoms and risk factors. Plan for next call: symptom management-how is cough, any other respiratory symptoms? Nephrology and oncology scheduled? Med assistance for inhalers? Review PCP cough meds, trelegy sample, MRI ordered, GI consult; cardiology F/U stop ASA. Needs to schedule with oncology [phone] & nephrology [phone]    Sheeba Mariscal RN

## 2025-07-18 ENCOUNTER — ANTI-COAG VISIT (OUTPATIENT)
Age: 70
End: 2025-07-18
Payer: MEDICARE

## 2025-07-18 ENCOUNTER — CARE COORDINATION (OUTPATIENT)
Dept: CARE COORDINATION | Age: 70
End: 2025-07-18

## 2025-07-18 DIAGNOSIS — I26.99 OTHER ACUTE PULMONARY EMBOLISM WITHOUT ACUTE COR PULMONALE (HCC): Primary | ICD-10-CM

## 2025-07-18 LAB
INTERNATIONAL NORMALIZATION RATIO, POC: 2.8
PROTHROMBIN TIME, POC: 0

## 2025-07-18 PROCEDURE — 99211 OFF/OP EST MAY X REQ PHY/QHP: CPT

## 2025-07-18 PROCEDURE — 85610 PROTHROMBIN TIME: CPT

## 2025-07-18 NOTE — PROGRESS NOTES
ANTICOAGULATION SERVICE    Date of Clinic Visit:  7/18/2025    Sunita Watts is a 70 y.o. female who presents to clinic today for anticoagulation monitoring and adjustment.    Recent INR Results:  Internal QC passed  Lab Results   Component Value Date    INR 2.8 07/18/2025    INR 2.3 07/14/2025       Current Warfarin Dosage:  Dosing Plan  As of 7/18/2025      TTR:  --   Full warfarin instructions:  7/21: 2.5 mg; Otherwise 5 mg every day                 Assessment/Plan:      Modify warfarin dose as noted above:  patient nicely in range will reduce dose a bit and re-check 1 week.    Next Clinic Appointment:  Return date  As of 7/18/2025      TTR:  --   Next INR check:  7/25/2025               Please call [facility] Anticoagulation Clinic at [phone] with any questions.     Thanks!  Tamar Mcgovern [facility]  Anticoagulation Service Pharmacist  7/18/2025 11:42 AM  For Pharmacy Admin Tracking Only    Intervention Detail: Dose Adjustment: 1, reason: Therapy De-escalation  Total # of Interventions Recommended: 1  Total # of Interventions Accepted: 1  Time Spent (min): 15

## 2025-07-18 NOTE — CARE COORDINATION
Care Transitions Note    Follow Up Call     Patient Current Location:  Home: [address] [place] [address]    Care Transition Nurse contacted the patient by telephone. Verified name and  as identifiers.    Additional needs identified to be addressed with provider   No needs identified                 Method of communication with provider: none.    Care Summary Note: Patient reached for follow up. Jenna doing well.     PCP HFU completed, she has received Trelgy Elipta sample, discussed need to contact PCP office when these are low. Reviewed Spiriva interventions, advised to anticipate information mailed to home.     Cardiology follow up completed, reviewed stop aspirin.     Conference call to [facility] per oncology staff cannot schedule without referral. Request routed to PCP. Transferred to nephrology, booking into December, discussed possible [place] location for sooner appt.  Contact provided for [place] office, call to [place] office, patient scheduled with Dr. Avilez  1:10 PM.     Call to [facility] oncology [facility] requesting assistance with new patient appt.    Continues with medication management for coumadin    Patient given contact for [facility] radiology for MRI scheduled.     She states cough is improved with PRN medications     Reviewed all upcoming appts, advised to utilize desk calendar for organization.     Plan of care updates since last contact:  Review of patient management of conditions/medications: follow up       Advance Care Planning:   Does patient have an Advance Directive: reviewed during previous call, see note. .    Medication Review:  Medications changed since last call, reviewed today.     Remote Patient Monitoring:  Offered patient enrollment in the Remote Patient Monitoring (RPM) program for in-home monitoring: Yes, but did not enroll at this time: controlled chronic disease management.    Assessments:  Care Transitions Subsequent and Final Call    Subsequent and Final

## 2025-07-21 ENCOUNTER — TELEPHONE (OUTPATIENT)
Dept: PRIMARY CARE CLINIC | Age: 70
End: 2025-07-21

## 2025-07-21 NOTE — TELEPHONE ENCOUNTER
Reached out to patient regarding fax from assistance program [employer], needing personal information that writer is not available to. Pt received paper work and will bring to office at appointment 7/23/2025. Will fax forms once received.

## 2025-07-23 ENCOUNTER — HOSPITAL ENCOUNTER (OUTPATIENT)
Age: 70
Setting detail: SPECIMEN
Discharge: HOME | End: 2025-07-23

## 2025-07-23 ENCOUNTER — OFFICE VISIT (OUTPATIENT)
Dept: PRIMARY CARE CLINIC | Age: 70
End: 2025-07-23
Payer: MEDICARE

## 2025-07-23 ENCOUNTER — CARE COORDINATION (OUTPATIENT)
Dept: CARE COORDINATION | Age: 70
End: 2025-07-23

## 2025-07-23 VITALS
HEART RATE: 66 BPM | DIASTOLIC BLOOD PRESSURE: 78 MMHG | SYSTOLIC BLOOD PRESSURE: 122 MMHG | HEIGHT: 61 IN | BODY MASS INDEX: 21.9 KG/M2 | OXYGEN SATURATION: 98 % | WEIGHT: 116 LBS

## 2025-07-23 DIAGNOSIS — Z13.1 SCREENING FOR DIABETES MELLITUS (DM): ICD-10-CM

## 2025-07-23 DIAGNOSIS — Z00.00 ENCOUNTER FOR WELL ADULT EXAM WITHOUT ABNORMAL FINDINGS: ICD-10-CM

## 2025-07-23 DIAGNOSIS — I50.33 ACUTE ON CHRONIC DIASTOLIC CONGESTIVE HEART FAILURE (HCC): ICD-10-CM

## 2025-07-23 DIAGNOSIS — M89.9 BONE LESION: ICD-10-CM

## 2025-07-23 DIAGNOSIS — I26.99 ACUTE PULMONARY EMBOLISM, UNSPECIFIED PULMONARY EMBOLISM TYPE, UNSPECIFIED WHETHER ACUTE COR PULMONALE PRESENT (HCC): Primary | ICD-10-CM

## 2025-07-23 DIAGNOSIS — Z13.6 SCREENING FOR CARDIOVASCULAR CONDITION: ICD-10-CM

## 2025-07-23 DIAGNOSIS — Z13.0 SCREENING, ANEMIA, DEFICIENCY, IRON: ICD-10-CM

## 2025-07-23 DIAGNOSIS — B00.9 HSV (HERPES SIMPLEX VIRUS) INFECTION: ICD-10-CM

## 2025-07-23 DIAGNOSIS — J44.1 COPD WITH ACUTE EXACERBATION (HCC): ICD-10-CM

## 2025-07-23 DIAGNOSIS — I27.20 PULMONARY HYPERTENSION (HCC): ICD-10-CM

## 2025-07-23 LAB
ALBUMIN SERPL-MCNC: 3.6 G/DL (ref 3.5–5.2)
ALBUMIN/GLOB SERPL: 1 {RATIO} (ref 1–2.5)
ALP SERPL-CCNC: 321 U/L (ref 35–104)
ALT SERPL-CCNC: 37 U/L (ref 10–35)
ANION GAP SERPL CALCULATED.3IONS-SCNC: 9 MMOL/L (ref 9–16)
AST SERPL-CCNC: 42 U/L (ref 10–35)
BASOPHILS # BLD: 0.09 K/UL (ref 0–0.2)
BASOPHILS NFR BLD: 1 % (ref 0–2)
BILIRUB SERPL-MCNC: 0.3 MG/DL (ref 0–1.2)
BUN SERPL-MCNC: 17 MG/DL (ref 8–23)
CALCIUM SERPL-MCNC: 9.5 MG/DL (ref 8.6–10.4)
CHLORIDE SERPL-SCNC: 94 MMOL/L (ref 98–107)
CHOLEST SERPL-MCNC: 172 MG/DL (ref 0–199)
CHOLESTEROL/HDL RATIO: 2.6
CO2 SERPL-SCNC: 36 MMOL/L (ref 20–31)
CREAT SERPL-MCNC: 1.1 MG/DL (ref 0.6–0.9)
EOSINOPHIL # BLD: 0.1 K/UL (ref 0–0.44)
EOSINOPHILS RELATIVE PERCENT: 1 % (ref 1–4)
ERYTHROCYTE [DISTWIDTH] IN BLOOD BY AUTOMATED COUNT: 19.9 % (ref 11.8–14.4)
GFR, ESTIMATED: 53 ML/MIN/1.73M2
GLUCOSE SERPL-MCNC: 69 MG/DL (ref 74–99)
HCT VFR BLD AUTO: 37.4 % (ref 36.3–47.1)
HDLC SERPL-MCNC: 66 MG/DL
HGB BLD-MCNC: 10.9 G/DL (ref 11.9–15.1)
IMM GRANULOCYTES # BLD AUTO: 0.09 K/UL (ref 0–0.3)
IMM GRANULOCYTES NFR BLD: 1 %
LDLC SERPL CALC-MCNC: 87 MG/DL (ref 0–100)
LYMPHOCYTES NFR BLD: 1.36 K/UL (ref 1.1–3.7)
LYMPHOCYTES RELATIVE PERCENT: 17 % (ref 24–43)
MCH RBC QN AUTO: 25.6 PG (ref 25.2–33.5)
MCHC RBC AUTO-ENTMCNC: 29.1 G/DL (ref 28.4–34.8)
MCV RBC AUTO: 87.8 FL (ref 82.6–102.9)
MONOCYTES NFR BLD: 0.74 K/UL (ref 0.1–1.2)
MONOCYTES NFR BLD: 9 % (ref 3–12)
NEUTROPHILS NFR BLD: 71 % (ref 36–65)
NEUTS SEG NFR BLD: 5.86 K/UL (ref 1.5–8.1)
NRBC BLD-RTO: 0 PER 100 WBC
PLATELET # BLD AUTO: 435 K/UL (ref 138–453)
PMV BLD AUTO: 10.1 FL (ref 8.1–13.5)
POTASSIUM SERPL-SCNC: 4 MMOL/L (ref 3.7–5.3)
PROT SERPL-MCNC: 7.1 G/DL (ref 6.6–8.7)
RBC # BLD AUTO: 4.26 M/UL (ref 3.95–5.11)
RBC # BLD: ABNORMAL 10*6/UL
SODIUM SERPL-SCNC: 139 MMOL/L (ref 136–145)
TRIGL SERPL-MCNC: 95 MG/DL
VLDLC SERPL CALC-MCNC: 19 MG/DL (ref 1–30)
WBC OTHER # BLD: 8.2 K/UL (ref 3.5–11.3)

## 2025-07-23 PROCEDURE — G8427 DOCREV CUR MEDS BY ELIG CLIN: HCPCS | Performed by: NURSE PRACTITIONER

## 2025-07-23 PROCEDURE — 1036F TOBACCO NON-USER: CPT | Performed by: NURSE PRACTITIONER

## 2025-07-23 PROCEDURE — 3074F SYST BP LT 130 MM HG: CPT | Performed by: NURSE PRACTITIONER

## 2025-07-23 PROCEDURE — 3023F SPIROM DOC REV: CPT | Performed by: NURSE PRACTITIONER

## 2025-07-23 PROCEDURE — 3017F COLORECTAL CA SCREEN DOC REV: CPT | Performed by: NURSE PRACTITIONER

## 2025-07-23 PROCEDURE — G8420 CALC BMI NORM PARAMETERS: HCPCS | Performed by: NURSE PRACTITIONER

## 2025-07-23 PROCEDURE — 3078F DIAST BP <80 MM HG: CPT | Performed by: NURSE PRACTITIONER

## 2025-07-23 PROCEDURE — 1123F ACP DISCUSS/DSCN MKR DOCD: CPT | Performed by: NURSE PRACTITIONER

## 2025-07-23 PROCEDURE — 99214 OFFICE O/P EST MOD 30 MIN: CPT | Performed by: NURSE PRACTITIONER

## 2025-07-23 PROCEDURE — G8400 PT W/DXA NO RESULTS DOC: HCPCS | Performed by: NURSE PRACTITIONER

## 2025-07-23 PROCEDURE — 1090F PRES/ABSN URINE INCON ASSESS: CPT | Performed by: NURSE PRACTITIONER

## 2025-07-23 PROCEDURE — 1111F DSCHRG MED/CURRENT MED MERGE: CPT | Performed by: NURSE PRACTITIONER

## 2025-07-23 NOTE — CARE COORDINATION
I was able to fax in the patients application for assistance on her Spiriva inhaler. Once I hear back from the company I will let her know.        Maeve Sanz [facility]  CC   Medication Assistance  [place]isa [place] [facility] [employer] [facility]    (P) [phone]  (F) [phone]

## 2025-07-25 ENCOUNTER — ANTI-COAG VISIT (OUTPATIENT)
Age: 70
End: 2025-07-25
Payer: MEDICARE

## 2025-07-25 ENCOUNTER — CARE COORDINATION (OUTPATIENT)
Dept: CARE COORDINATION | Age: 70
End: 2025-07-25

## 2025-07-25 DIAGNOSIS — I26.99 OTHER ACUTE PULMONARY EMBOLISM WITHOUT ACUTE COR PULMONALE (HCC): Primary | ICD-10-CM

## 2025-07-25 LAB
INTERNATIONAL NORMALIZATION RATIO, POC: 2
PROTHROMBIN TIME, POC: 0

## 2025-07-25 PROCEDURE — 85610 PROTHROMBIN TIME: CPT

## 2025-07-25 PROCEDURE — 99212 OFFICE O/P EST SF 10 MIN: CPT

## 2025-07-25 NOTE — PROGRESS NOTES
ANTICOAGULATION SERVICE    Date of Clinic Visit:  7/25/2025    Sunita Watts is a 70 y.o. female who presents to clinic today for anticoagulation monitoring and adjustment.    Recent INR Results:  Internal QC passed  Lab Results   Component Value Date    INR 2.0 07/25/2025    INR 2.8 07/18/2025       Current Warfarin Dosage:  Dosing Plan  As of 7/25/2025      TTR:  100.0% (5 d)   Full warfarin instructions:  5 mg every day                 Assessment/Plan:    Modify warfarin dose as noted above: INR remains in range today but has dropped to bottom of range. Weekly dose was decreased one week ago by 7% and INR decreased from 2.8 down to 2.0. I will increase weekly dose back up that 7%. I will recheck in 12 days.     Next Clinic Appointment:  Return date  As of 7/25/2025      TTR:  100.0% (5 d)   Next INR check:  8/6/2025               Please call [facility] Anticoagulation Clinic at [phone] with any questions.     Thanks!  Carlos Mina [facility]  Anticoagulation Service Pharmacist  7/25/2025 11:15 AM    For Pharmacy Admin Tracking Only    Intervention Detail: Dose Adjustment: 1, reason: Therapy Optimization  Total # of Interventions Recommended: 1  Total # of Interventions Accepted: 1  Time Spent (min): 15

## 2025-07-25 NOTE — CARE COORDINATION
Care Transitions Note    Final Call     Patient Current Location:  Home: [address] [place] [address]    Care Transition Nurse contacted the patient by telephone. Verified name and  as identifiers.    Patient graduated from the Care Transitions program on 25.  Patient/family progressing towards self-management. .      Advance Care Planning:   Does patient have an Advance Directive: reviewed during previous call, see note. .    Handoff:   Patient referred back to [facility] for continued management.     Care Summary Note: Patient reached for follow up. States doing well, noting some fatigue with activity. Discussed may wish to request assistance devices from PCP if thinks has need. Reviewed oncology, MRI scheduled. Spiriva assistance has been submitted. Reminded to contact PCP when Trelegy samples low. No further concerns for CTN, plan hand off to [facility] and patient agrees.     Assessments:  Care Transitions Subsequent and Final Call    Schedule Follow Up Appointment with PCP: Completed  Subsequent and Final Calls  Do you have any ongoing symptoms?: No  Have your medications changed?: No  Do you have any questions related to your medications?: No  Do you currently have any active services?: No  Do you have any needs or concerns that I can assist you with?: No  Identified Barriers: None  Care Transitions Interventions  Other Interventions:              Upcoming Appointments:    Future Appointments         Provider Specialty Dept Phone    2025 1:10 PM Ayad Avilez MD Nephrology [phone]    2025 3:00 PM [facility] [phone]    2025 8:30 AM [facility] MRI ROOM Radiology [phone]    2025 9:30 AM [facility] MRI ROOM Radiology [phone]    2025 3:45 PM Chris Rand MD Oncology [phone]    2025 2:00 PM SCHEDULE, [facility] PFT Pulmonary Function Testing [phone]    2025 3:00 PM Adam Perkins MD Pulmonology [phone]    2025 9:45 AM Fred Trinh,

## 2025-07-28 ENCOUNTER — CARE COORDINATION (OUTPATIENT)
Dept: CARE COORDINATION | Age: 70
End: 2025-07-28

## 2025-07-28 NOTE — CARE COORDINATION
Patient called stating Van is saying they are missing a lot of the information for her application. I see the office faxed their portion to the company instead of sending it to me. I faxed her whole application last week. I called and they haven't gotten it yet so I refaxed it.        Maeve Sanz [facility]  CC   Medication Assistance  [employer] [place] [facility] [employer] [facility]    (P) [phone]  (F) [phone]

## 2025-07-29 ENCOUNTER — CARE COORDINATION (OUTPATIENT)
Dept: CARE COORDINATION | Age: 70
End: 2025-07-29

## 2025-07-29 ENCOUNTER — PATIENT MESSAGE (OUTPATIENT)
Dept: PRIMARY CARE CLINIC | Age: 70
End: 2025-07-29

## 2025-07-29 NOTE — CARE COORDINATION
Van states they are missing patients proof of income. I faxed them another copy to update her application.          Maeve Sanz [facility]  CC   Medication Assistance  [employer] [place] [employer]    (P) [phone]  (F) [phone]

## 2025-07-29 NOTE — CARE COORDINATION
Ambulatory Care Coordination Note     2025   1:23 PM    Patient Current Location:  Home: [address] [place] [address]     This patient was received as a referral from Ambulatory Care Manager .    ACM contacted the patient by telephone. Verified name and  with patient as identifiers. Provided introduction to self, and explanation of the ACM role.   Patient accepted care management services at this time.          ACM: [place] Christianson RN     Challenges to be reviewed by the provider   Additional needs identified to be addressed with provider Yes  Asking for sample of Trelegy                Method of communication with provider: chart routing.    Utilization: Initial Call - N/A  Hx CHF, COPD, HTN, PE, CKD  Care Summary Note: States, she feels 'pretty good'.  She uses O2 continuous. She denies CP, SOB or swelling. Saw nephrology yesterday, PCP . She has appointments with hematology/ oncology  and pulmonary . She's going to the coumadin clinic. She applied for the patient assistance program for Spiriva. She has sample of Trelegy and will need another one.     Offered patient enrollment in the Remote Patient Monitoring (RPM) program for in-home monitoring: Deferred at this time because and; will discuss at next outreach.     Assessments Completed:   Congestive Heart Failure Assessment           Symptoms:  None: Yes            ,   COPD Assessment    Does the patient understand envrionmental exposure?: Yes  Is the patient able to verbalize Rescue vs. Long Acting medications?: Yes  Does the patient have a nebulizer?: Yes  Does the patient use a space with inhaled medications?: No            Symptoms:  None: Yes            ,   General Assessment    Do you have any symptoms that are causing concern?: No          Medications Reviewed:   Patient denies any changes with medications and reports taking all medications as prescribed.    Advance Care Planning:   Not reviewed during this call     Care Planning:

## 2025-07-31 ENCOUNTER — CARE COORDINATION (OUTPATIENT)
Dept: CARE COORDINATION | Age: 70
End: 2025-07-31

## 2025-07-31 NOTE — CARE COORDINATION
Patient was approved into the [employer] program for her Spiriva inhaler. They will ship her a 3 month supply to her home for free until 12/31/25.        Maeve Sanz [facility]  CC   Medication Assistance  [employer]isa [employer] [place] [facility] [employer] [facility]    (P) [phone]  (F) [phone]

## 2025-08-05 ASSESSMENT — ENCOUNTER SYMPTOMS: SHORTNESS OF BREATH: 1

## 2025-08-06 ENCOUNTER — ANTI-COAG VISIT (OUTPATIENT)
Age: 70
End: 2025-08-06
Payer: MEDICARE

## 2025-08-06 DIAGNOSIS — I26.01 ACUTE SEPTIC PULMONARY EMBOLISM WITH ACUTE COR PULMONALE (HCC): Primary | ICD-10-CM

## 2025-08-06 LAB
INTERNATIONAL NORMALIZATION RATIO, POC: 1.7
PROTHROMBIN TIME, POC: 0

## 2025-08-06 PROCEDURE — 85610 PROTHROMBIN TIME: CPT

## 2025-08-08 ENCOUNTER — TELEPHONE (OUTPATIENT)
Dept: PRIMARY CARE CLINIC | Age: 70
End: 2025-08-08

## 2025-08-08 ENCOUNTER — HOSPITAL ENCOUNTER (OUTPATIENT)
Dept: MRI IMAGING | Age: 70
Discharge: HOME | End: 2025-08-10

## 2025-08-08 DIAGNOSIS — R93.7 ABNORMAL CT OF SPINE: Primary | ICD-10-CM

## 2025-08-08 DIAGNOSIS — R93.7 ABNORMAL CT OF SPINE: ICD-10-CM

## 2025-08-08 RX ORDER — LORAZEPAM 0.5 MG/1
0.5 TABLET ORAL ONCE
Qty: 1 TABLET | Refills: 0 | Status: SHIPPED | OUTPATIENT
Start: 2025-08-08 | End: 2025-08-08

## 2025-08-11 ENCOUNTER — CARE COORDINATION (OUTPATIENT)
Dept: CARE COORDINATION | Age: 70
End: 2025-08-11

## 2025-08-11 ASSESSMENT — ENCOUNTER SYMPTOMS: DYSPNEA ASSOCIATED WITH: EXERTION

## 2025-08-15 ENCOUNTER — OFFICE VISIT (OUTPATIENT)
Dept: PRIMARY CARE CLINIC | Age: 70
End: 2025-08-15
Payer: MEDICARE

## 2025-08-15 ENCOUNTER — HOSPITAL ENCOUNTER (OUTPATIENT)
Age: 70
Setting detail: SPECIMEN
Discharge: HOME | End: 2025-08-15

## 2025-08-15 VITALS
HEART RATE: 81 BPM | BODY MASS INDEX: 22.66 KG/M2 | OXYGEN SATURATION: 94 % | DIASTOLIC BLOOD PRESSURE: 78 MMHG | SYSTOLIC BLOOD PRESSURE: 126 MMHG | WEIGHT: 120 LBS

## 2025-08-15 DIAGNOSIS — R80.9 PROTEINURIA, UNSPECIFIED TYPE: ICD-10-CM

## 2025-08-15 DIAGNOSIS — J06.9 URI WITH COUGH AND CONGESTION: Primary | ICD-10-CM

## 2025-08-15 DIAGNOSIS — N17.9 AKI (ACUTE KIDNEY INJURY): ICD-10-CM

## 2025-08-15 DIAGNOSIS — I26.99 ACUTE PULMONARY EMBOLISM, UNSPECIFIED PULMONARY EMBOLISM TYPE, UNSPECIFIED WHETHER ACUTE COR PULMONALE PRESENT (HCC): ICD-10-CM

## 2025-08-15 DIAGNOSIS — F40.240 CLAUSTROPHOBIA: ICD-10-CM

## 2025-08-15 DIAGNOSIS — N18.31 STAGE 3A CHRONIC KIDNEY DISEASE (HCC): ICD-10-CM

## 2025-08-15 DIAGNOSIS — I50.33 ACUTE ON CHRONIC DIASTOLIC CONGESTIVE HEART FAILURE (HCC): ICD-10-CM

## 2025-08-15 DIAGNOSIS — R93.7 ABNORMAL CT OF SPINE: ICD-10-CM

## 2025-08-15 DIAGNOSIS — J44.1 COPD WITH ACUTE EXACERBATION (HCC): ICD-10-CM

## 2025-08-15 LAB
ALBUMIN SERPL-MCNC: 3.9 G/DL (ref 3.5–5.2)
ALBUMIN/GLOB SERPL: 1.1 {RATIO} (ref 1–2.5)
ALP SERPL-CCNC: 320 U/L (ref 35–104)
ALT SERPL-CCNC: 46 U/L (ref 10–35)
ANION GAP SERPL CALCULATED.3IONS-SCNC: 11 MMOL/L (ref 9–16)
AST SERPL-CCNC: 48 U/L (ref 10–35)
BILIRUB SERPL-MCNC: 0.4 MG/DL (ref 0–1.2)
BUN SERPL-MCNC: 19 MG/DL (ref 8–23)
CALCIUM SERPL-MCNC: 9.8 MG/DL (ref 8.6–10.4)
CHLORIDE SERPL-SCNC: 95 MMOL/L (ref 98–107)
CO2 SERPL-SCNC: 34 MMOL/L (ref 20–31)
CREAT SERPL-MCNC: 0.9 MG/DL (ref 0.6–0.9)
GFR, ESTIMATED: 68 ML/MIN/1.73M2
GLUCOSE SERPL-MCNC: 88 MG/DL (ref 74–99)
POTASSIUM SERPL-SCNC: 3.1 MMOL/L (ref 3.7–5.3)
PROT SERPL-MCNC: 7.3 G/DL (ref 6.6–8.7)
SODIUM SERPL-SCNC: 140 MMOL/L (ref 136–145)

## 2025-08-15 PROCEDURE — 1090F PRES/ABSN URINE INCON ASSESS: CPT | Performed by: NURSE PRACTITIONER

## 2025-08-15 PROCEDURE — 1160F RVW MEDS BY RX/DR IN RCRD: CPT | Performed by: NURSE PRACTITIONER

## 2025-08-15 PROCEDURE — 99214 OFFICE O/P EST MOD 30 MIN: CPT | Performed by: NURSE PRACTITIONER

## 2025-08-15 PROCEDURE — 1123F ACP DISCUSS/DSCN MKR DOCD: CPT | Performed by: NURSE PRACTITIONER

## 2025-08-15 PROCEDURE — G8420 CALC BMI NORM PARAMETERS: HCPCS | Performed by: NURSE PRACTITIONER

## 2025-08-15 PROCEDURE — 3074F SYST BP LT 130 MM HG: CPT | Performed by: NURSE PRACTITIONER

## 2025-08-15 PROCEDURE — 3078F DIAST BP <80 MM HG: CPT | Performed by: NURSE PRACTITIONER

## 2025-08-15 PROCEDURE — 3017F COLORECTAL CA SCREEN DOC REV: CPT | Performed by: NURSE PRACTITIONER

## 2025-08-15 PROCEDURE — 1036F TOBACCO NON-USER: CPT | Performed by: NURSE PRACTITIONER

## 2025-08-15 PROCEDURE — 1159F MED LIST DOCD IN RCRD: CPT | Performed by: NURSE PRACTITIONER

## 2025-08-15 PROCEDURE — G8427 DOCREV CUR MEDS BY ELIG CLIN: HCPCS | Performed by: NURSE PRACTITIONER

## 2025-08-15 PROCEDURE — 3023F SPIROM DOC REV: CPT | Performed by: NURSE PRACTITIONER

## 2025-08-15 PROCEDURE — G8400 PT W/DXA NO RESULTS DOC: HCPCS | Performed by: NURSE PRACTITIONER

## 2025-08-15 RX ORDER — GUAIFENESIN 600 MG/1
600 TABLET, EXTENDED RELEASE ORAL 2 TIMES DAILY
Qty: 30 TABLET | Refills: 1 | Status: SHIPPED | OUTPATIENT
Start: 2025-08-15 | End: 2025-09-14

## 2025-08-15 RX ORDER — DOXYCYCLINE HYCLATE 100 MG
100 TABLET ORAL 2 TIMES DAILY
Qty: 20 TABLET | Refills: 0 | Status: SHIPPED | OUTPATIENT
Start: 2025-08-15 | End: 2025-08-25

## 2025-08-15 RX ORDER — PREDNISONE 20 MG/1
20 TABLET ORAL DAILY
Qty: 5 TABLET | Refills: 0 | Status: SHIPPED | OUTPATIENT
Start: 2025-08-15 | End: 2025-08-20

## 2025-08-18 ENCOUNTER — TELEPHONE (OUTPATIENT)
Dept: PRIMARY CARE CLINIC | Age: 70
End: 2025-08-18

## 2025-08-18 DIAGNOSIS — E87.6 HYPOKALEMIA: Primary | ICD-10-CM

## 2025-08-18 RX ORDER — POTASSIUM CHLORIDE 1500 MG/1
20 TABLET, EXTENDED RELEASE ORAL DAILY
Qty: 10 TABLET | Refills: 0 | Status: SHIPPED | OUTPATIENT
Start: 2025-08-18 | End: 2025-08-28

## 2025-08-19 ENCOUNTER — ANTI-COAG VISIT (OUTPATIENT)
Age: 70
End: 2025-08-19
Payer: MEDICARE

## 2025-08-19 DIAGNOSIS — I26.01 ACUTE SEPTIC PULMONARY EMBOLISM WITH ACUTE COR PULMONALE (HCC): Primary | ICD-10-CM

## 2025-08-19 LAB
INTERNATIONAL NORMALIZATION RATIO, POC: 1.7
PROTHROMBIN TIME, POC: NORMAL

## 2025-08-19 PROCEDURE — 85610 PROTHROMBIN TIME: CPT

## 2025-08-19 PROCEDURE — 99212 OFFICE O/P EST SF 10 MIN: CPT

## 2025-08-19 ASSESSMENT — ENCOUNTER SYMPTOMS
COUGH: 1
SHORTNESS OF BREATH: 1

## 2025-08-20 ENCOUNTER — OFFICE VISIT (OUTPATIENT)
Dept: PULMONOLOGY | Age: 70
End: 2025-08-20
Payer: MEDICARE

## 2025-08-20 ENCOUNTER — HOSPITAL ENCOUNTER (OUTPATIENT)
Dept: PULMONOLOGY | Age: 70
Discharge: HOME | End: 2025-08-20
Payer: MEDICARE

## 2025-08-20 VITALS
WEIGHT: 121.2 LBS | BODY MASS INDEX: 22.88 KG/M2 | TEMPERATURE: 97.2 F | SYSTOLIC BLOOD PRESSURE: 118 MMHG | DIASTOLIC BLOOD PRESSURE: 66 MMHG | HEART RATE: 57 BPM | OXYGEN SATURATION: 96 %

## 2025-08-20 DIAGNOSIS — J96.11 CHRONIC RESPIRATORY FAILURE WITH HYPOXIA (HCC): ICD-10-CM

## 2025-08-20 DIAGNOSIS — J44.9 STAGE 3 SEVERE COPD BY GOLD CLASSIFICATION (HCC): Primary | ICD-10-CM

## 2025-08-20 DIAGNOSIS — J43.2 CENTRILOBULAR EMPHYSEMA (HCC): ICD-10-CM

## 2025-08-20 DIAGNOSIS — I27.20 PULMONARY HYPERTENSION (HCC): ICD-10-CM

## 2025-08-20 DIAGNOSIS — G47.30 SLEEP-RELATED BREATHING DISORDER: ICD-10-CM

## 2025-08-20 DIAGNOSIS — I27.81 COR PULMONALE (CHRONIC) (HCC): ICD-10-CM

## 2025-08-20 PROCEDURE — 1036F TOBACCO NON-USER: CPT | Performed by: INTERNAL MEDICINE

## 2025-08-20 PROCEDURE — G8420 CALC BMI NORM PARAMETERS: HCPCS | Performed by: INTERNAL MEDICINE

## 2025-08-20 PROCEDURE — 3017F COLORECTAL CA SCREEN DOC REV: CPT | Performed by: INTERNAL MEDICINE

## 2025-08-20 PROCEDURE — G8400 PT W/DXA NO RESULTS DOC: HCPCS | Performed by: INTERNAL MEDICINE

## 2025-08-20 PROCEDURE — G8427 DOCREV CUR MEDS BY ELIG CLIN: HCPCS | Performed by: INTERNAL MEDICINE

## 2025-08-20 PROCEDURE — 99214 OFFICE O/P EST MOD 30 MIN: CPT | Performed by: INTERNAL MEDICINE

## 2025-08-20 PROCEDURE — 94640 AIRWAY INHALATION TREATMENT: CPT

## 2025-08-20 PROCEDURE — 3074F SYST BP LT 130 MM HG: CPT | Performed by: INTERNAL MEDICINE

## 2025-08-20 PROCEDURE — 99213 OFFICE O/P EST LOW 20 MIN: CPT | Performed by: INTERNAL MEDICINE

## 2025-08-20 PROCEDURE — 1090F PRES/ABSN URINE INCON ASSESS: CPT | Performed by: INTERNAL MEDICINE

## 2025-08-20 PROCEDURE — 6370000000 HC RX 637 (ALT 250 FOR IP): Performed by: INTERNAL MEDICINE

## 2025-08-20 PROCEDURE — 94729 DIFFUSING CAPACITY: CPT

## 2025-08-20 PROCEDURE — 94060 EVALUATION OF WHEEZING: CPT

## 2025-08-20 PROCEDURE — 1123F ACP DISCUSS/DSCN MKR DOCD: CPT | Performed by: INTERNAL MEDICINE

## 2025-08-20 PROCEDURE — 94726 PLETHYSMOGRAPHY LUNG VOLUMES: CPT

## 2025-08-20 PROCEDURE — 3023F SPIROM DOC REV: CPT | Performed by: INTERNAL MEDICINE

## 2025-08-20 PROCEDURE — 1159F MED LIST DOCD IN RCRD: CPT | Performed by: INTERNAL MEDICINE

## 2025-08-20 PROCEDURE — 3078F DIAST BP <80 MM HG: CPT | Performed by: INTERNAL MEDICINE

## 2025-08-20 RX ORDER — ALBUTEROL SULFATE 90 UG/1
4 INHALANT RESPIRATORY (INHALATION) ONCE
Status: COMPLETED | OUTPATIENT
Start: 2025-08-20 | End: 2025-08-20

## 2025-08-20 RX ORDER — LORAZEPAM 0.5 MG/1
TABLET ORAL
COMMUNITY
Start: 2025-08-08

## 2025-08-20 RX ADMIN — ALBUTEROL SULFATE 4 PUFF: 90 AEROSOL, METERED RESPIRATORY (INHALATION) at 14:23

## 2025-08-21 ENCOUNTER — HOSPITAL ENCOUNTER (OUTPATIENT)
Age: 70
Setting detail: SPECIMEN
Discharge: HOME | End: 2025-08-21

## 2025-08-21 DIAGNOSIS — E87.6 HYPOKALEMIA: ICD-10-CM

## 2025-08-21 DIAGNOSIS — R93.7 ABNORMAL CT OF SPINE: ICD-10-CM

## 2025-08-21 LAB
BUN SERPL-MCNC: 21 MG/DL (ref 8–23)
CREAT SERPL-MCNC: 0.8 MG/DL (ref 0.6–0.9)
GFR, ESTIMATED: 78 ML/MIN/1.73M2
POTASSIUM SERPL-SCNC: 4 MMOL/L (ref 3.7–5.3)

## 2025-08-26 ENCOUNTER — HOSPITAL ENCOUNTER (OUTPATIENT)
Dept: MRI IMAGING | Age: 70
Discharge: HOME | End: 2025-08-28
Payer: MEDICARE

## 2025-08-26 PROCEDURE — 6360000004 HC RX CONTRAST MEDICATION: Performed by: INTERNAL MEDICINE

## 2025-08-26 PROCEDURE — 72158 MRI LUMBAR SPINE W/O & W/DYE: CPT

## 2025-08-26 PROCEDURE — A9579 GAD-BASE MR CONTRAST NOS,1ML: HCPCS | Performed by: INTERNAL MEDICINE

## 2025-08-26 PROCEDURE — 72197 MRI PELVIS W/O & W/DYE: CPT

## 2025-08-26 RX ORDER — GADOTERIDOL 279.3 MG/ML
15 INJECTION INTRAVENOUS
Status: COMPLETED | OUTPATIENT
Start: 2025-08-26 | End: 2025-08-26

## 2025-08-26 RX ADMIN — GADOTERIDOL 12 ML: 279.3 INJECTION, SOLUTION INTRAVENOUS at 10:39

## 2025-08-28 ENCOUNTER — CARE COORDINATION (OUTPATIENT)
Dept: CARE COORDINATION | Age: 70
End: 2025-08-28

## 2025-08-28 ASSESSMENT — ENCOUNTER SYMPTOMS: DYSPNEA ASSOCIATED WITH: MINIMAL EXERTION

## (undated) DEVICE — GLIDESHEATH SLENDER STAINLESS STEEL KIT: Brand: GLIDESHEATH SLENDER

## (undated) DEVICE — CATHETER GUID 5FR GWIRE 0.058IN COR EXTRA BKUP SUPP 3.5 ACT

## (undated) DEVICE — CATHETER ANGIO PIG 145 DEG 6 FRX110 CM 7.5 CM PERFORMA

## (undated) DEVICE — TRAY SURG CUST CRD CATH TOLEDO

## (undated) DEVICE — ANGIOGRAPHIC CATHETER: Brand: EXPO™

## (undated) DEVICE — CATHETER ANGIO 5FR L100CM GRY S STL NYL JL3.5 3 SEG BRAID L

## (undated) DEVICE — CATH BLLN ANGIO 3.25X12MM NC EUPHORIA RX

## (undated) DEVICE — CATHETER PRESSURE WEDGE BLLN 6FRX110CM

## (undated) DEVICE — CATHETER ANGIO 5FR L110CM DIA0.047IN VENT PIG IMPLS

## (undated) DEVICE — SURGICAL PROCEDURE TRAY CRD CATH SVMMC

## (undated) DEVICE — GUIDEWIRE 35/260/FC/PTFE/3J: Brand: GUIDEWIRE

## (undated) DEVICE — CATHETER ANGIO 5FR L100CM GRY S STL NYL JR4 3 SEG BRAID L

## (undated) DEVICE — BAND COMPR L24CM REG CLR PLAS HEMSTAT EXT HK AND LOOP RETEN

## (undated) DEVICE — GUIDEWIRE VASC L150CM DIA0.025IN TIP DIA3MM L7MM INTVASC S

## (undated) DEVICE — CATHETER GUID 6FR L100CM DIA0.071IN NYL SHFT EBU3.5

## (undated) DEVICE — Device: Brand: EAGLE EYE PLATINUM RX DIGITAL IVUS CATHETER

## (undated) DEVICE — GLIDESHEATH SLENDER ACCESS KIT: Brand: GLIDESHEATH SLENDER

## (undated) DEVICE — KIT MICRO INTRO 4FR STIFF 40CM NIGHTENALL TUNGSTEN 7SMT